# Patient Record
Sex: FEMALE | Race: WHITE | NOT HISPANIC OR LATINO | Employment: OTHER | URBAN - NONMETROPOLITAN AREA
[De-identification: names, ages, dates, MRNs, and addresses within clinical notes are randomized per-mention and may not be internally consistent; named-entity substitution may affect disease eponyms.]

---

## 2017-01-30 RX ORDER — ASPIRIN AND DIPYRIDAMOLE 25; 200 MG/1; MG/1
CAPSULE, EXTENDED RELEASE ORAL
Qty: 180 CAPSULE | Refills: 0 | Status: SHIPPED | OUTPATIENT
Start: 2017-01-30 | End: 2017-05-01 | Stop reason: SDUPTHER

## 2017-03-19 PROBLEM — H25.811 COMBINED FORMS OF AGE-RELATED CATARACT, RIGHT EYE: Status: ACTIVE | Noted: 2017-03-19

## 2017-03-19 RX ORDER — CYCLOPENTOLATE HYDROCHLORIDE 20 MG/ML
1 SOLUTION/ DROPS OPHTHALMIC
Status: CANCELLED | OUTPATIENT
Start: 2017-03-19 | End: 2017-03-19

## 2017-03-19 RX ORDER — PHENYLEPHRINE HYDROCHLORIDE 100 MG/ML
1 SOLUTION/ DROPS OPHTHALMIC
Status: CANCELLED | OUTPATIENT
Start: 2017-03-19 | End: 2017-03-19

## 2017-03-19 RX ORDER — POLYMYXIN B SULFATE AND TRIMETHOPRIM 1; 10000 MG/ML; [USP'U]/ML
1 SOLUTION OPHTHALMIC
Status: CANCELLED | OUTPATIENT
Start: 2017-03-19 | End: 2017-03-26

## 2017-03-20 ENCOUNTER — HOSPITAL ENCOUNTER (OUTPATIENT)
Facility: HOSPITAL | Age: 82
Setting detail: HOSPITAL OUTPATIENT SURGERY
Discharge: HOME | End: 2017-03-20
Attending: OPHTHALMOLOGY | Admitting: OPHTHALMOLOGY

## 2017-03-20 VITALS
HEART RATE: 62 BPM | OXYGEN SATURATION: 97 % | SYSTOLIC BLOOD PRESSURE: 143 MMHG | TEMPERATURE: 97.6 F | BODY MASS INDEX: 33.86 KG/M2 | HEIGHT: 62 IN | WEIGHT: 184 LBS | RESPIRATION RATE: 18 BRPM | DIASTOLIC BLOOD PRESSURE: 59 MMHG

## 2017-03-20 PROBLEM — H26.491 AFTER-CATARACT OF RIGHT EYE WITH VISION OBSCURED: Status: ACTIVE | Noted: 2017-03-19

## 2017-03-20 PROBLEM — H26.491 AFTER-CATARACT OF RIGHT EYE WITH VISION OBSCURED: Status: RESOLVED | Noted: 2017-03-19 | Resolved: 2017-03-20

## 2017-03-20 RX ORDER — PREDNISOLONE ACETATE 10 MG/ML
SUSPENSION/ DROPS OPHTHALMIC
Status: DISCONTINUED
Start: 2017-03-20 | End: 2017-03-20 | Stop reason: HOSPADM

## 2017-03-20 RX ORDER — PREDNISOLONE ACETATE 10 MG/ML
SUSPENSION/ DROPS OPHTHALMIC AS NEEDED
Status: DISCONTINUED | OUTPATIENT
Start: 2017-03-20 | End: 2017-03-20 | Stop reason: HOSPADM

## 2017-03-20 RX ORDER — PREDNISOLONE ACETATE 10 MG/ML
1 SUSPENSION/ DROPS OPHTHALMIC
Status: DISCONTINUED | OUTPATIENT
Start: 2017-03-20 | End: 2017-03-20 | Stop reason: HOSPADM

## 2017-03-20 RX ORDER — TETRACAINE HYDROCHLORIDE 5 MG/ML
1 SOLUTION OPHTHALMIC ONCE
Status: DISCONTINUED | OUTPATIENT
Start: 2017-03-20 | End: 2017-03-20 | Stop reason: HOSPADM

## 2017-03-20 RX ORDER — TETRACAINE HYDROCHLORIDE 5 MG/ML
1 SOLUTION OPHTHALMIC
Status: CANCELLED | OUTPATIENT
Start: 2017-03-20 | End: 2017-03-20

## 2017-03-20 RX ORDER — TETRACAINE HYDROCHLORIDE 5 MG/ML
1 SOLUTION OPHTHALMIC
Status: ACTIVE | OUTPATIENT
Start: 2017-03-20 | End: 2017-03-20

## 2017-03-20 RX ORDER — PREDNISOLONE ACETATE 10 MG/ML
1 SUSPENSION/ DROPS OPHTHALMIC
Status: CANCELLED | OUTPATIENT
Start: 2017-03-20

## 2017-03-20 RX ORDER — TETRACAINE HYDROCHLORIDE 5 MG/ML
SOLUTION OPHTHALMIC AS NEEDED
Status: DISCONTINUED | OUTPATIENT
Start: 2017-03-20 | End: 2017-03-20 | Stop reason: HOSPADM

## 2017-03-20 RX ORDER — PREDNISOLONE ACETATE 10 MG/ML
SUSPENSION/ DROPS OPHTHALMIC
Status: DISCONTINUED
Start: 2017-03-20 | End: 2017-03-20 | Stop reason: WASHOUT

## 2017-03-20 RX ORDER — BALANCED SALT SOLUTION 6.4; .75; .48; .3; 3.9; 1.7 MG/ML; MG/ML; MG/ML; MG/ML; MG/ML; MG/ML
SOLUTION OPHTHALMIC AS NEEDED
Status: DISCONTINUED | OUTPATIENT
Start: 2017-03-20 | End: 2017-03-20 | Stop reason: HOSPADM

## 2017-03-20 RX ORDER — BALANCED SALT SOLUTION 6.4; .75; .48; .3; 3.9; 1.7 MG/ML; MG/ML; MG/ML; MG/ML; MG/ML; MG/ML
SOLUTION OPHTHALMIC
Status: DISCONTINUED
Start: 2017-03-20 | End: 2017-03-20 | Stop reason: HOSPADM

## 2017-03-20 RX ORDER — TETRACAINE HYDROCHLORIDE 5 MG/ML
SOLUTION OPHTHALMIC
Status: DISCONTINUED
Start: 2017-03-20 | End: 2017-03-20 | Stop reason: HOSPADM

## 2017-03-20 NOTE — OP NOTE
Pre-op Diagnosis: Posterior Capsular Opacification, Right eye  Procedure: Nd: YAG Laser Capsulotomy, Right eye    Post-op Diagnosis: Posterior Capsular Opacification, Right eye    Indications:  Tanya Elise is a 83 y.o. female with Posterior Capsular Opacification. It was recommended that the next step in her management be a Nd:YAG laser capsulotomy in the right eye.      Procedure: The right eye was dilated prior to arrival in the laser suite.  A drop of topical Tetracaine was instilled in the right conjunctival cul-de-sac and the patient was then positioned in front of the Nd:YAG laser. The correct eye was again confirmed verbally by both the patient and the surgeon.  The laser contact lens was positioned on the right eye and the capsulotomy was carried out without difficulty using the following parameters:    Laser: Nd:YAG  Spot Size: Fixed  Burst Mode: I  Power Settin.5 mJ/burst  Number of shots: 87  Total energy delivered: 150 mJ    Immediately following the procedure, the right eye was rinsed with BSS solution and a drop of prednisolone acetate 1% was applied.    The patient tolerated the procedure without difficulty. No complications were encountered.    The patient was discharged home with the appropriate instructions.    Ev Cortez MD   3/20/2017  12:04 PM

## 2017-03-20 NOTE — PLAN OF CARE
Problem: Perioperative Period (Adult)  Goal: Signs and Symptoms of Listed Potential Problems Will be Absent or Manageable (Perioperative Period)  Outcome: Ongoing (interventions implemented as appropriate)    03/20/17 0957   Perioperative Period   Problems Assessed (Perioperative Period) all   Problems Present (Perioperative Period) none

## 2017-03-20 NOTE — DISCHARGE INSTRUCTIONS
Post Capsulotomy      POST YAG LASER CAPSULOTOMY      1. ACTIVITY: No physical restrictions.  2. DIET: No dietary restrictions.  3. MEDICATIONS:      A. Resume all the previous medications.      B. One drop of Pred Forte 1%  to operative eye 4 times           a day for 5 days. Then twice a day for  5 days then stop.          Start day of procedure as soon as you get home.  4. Special Instructions:     A. Call the doctor if there is any sudden change in vision. It is normal to see         new floaters or spot floating in your vision after the laser however, if there          is any sudden changes in vision, please call the office during normal work          day [phone]     B. If unable to reach the doctor, come to the Emergency Room at the [facility].    If you have any concerning problems, call your doctor or   the [facility] Emergency Dept   at [phone].Call office with any changes to vision. Spots and floaters are normal.    No dietary or physical restrictions.

## 2017-05-01 ENCOUNTER — OFFICE VISIT (OUTPATIENT)
Dept: INTERNAL MEDICINE | Facility: CLINIC | Age: 82
End: 2017-05-01

## 2017-05-01 VITALS
HEIGHT: 62 IN | DIASTOLIC BLOOD PRESSURE: 70 MMHG | WEIGHT: 203 LBS | OXYGEN SATURATION: 96 % | BODY MASS INDEX: 37.36 KG/M2 | TEMPERATURE: 97.6 F | RESPIRATION RATE: 12 BRPM | SYSTOLIC BLOOD PRESSURE: 122 MMHG | HEART RATE: 63 BPM

## 2017-05-01 DIAGNOSIS — G89.29 CHRONIC PERISCAPULAR PAIN ON RIGHT SIDE: ICD-10-CM

## 2017-05-01 DIAGNOSIS — M25.511 CHRONIC PERISCAPULAR PAIN ON RIGHT SIDE: ICD-10-CM

## 2017-05-01 DIAGNOSIS — K21.9 GASTROESOPHAGEAL REFLUX DISEASE, ESOPHAGITIS PRESENCE NOT SPECIFIED: ICD-10-CM

## 2017-05-01 DIAGNOSIS — I10 ESSENTIAL HYPERTENSION: Primary | ICD-10-CM

## 2017-05-01 PROCEDURE — 99214 OFFICE O/P EST MOD 30 MIN: CPT | Performed by: INTERNAL MEDICINE

## 2017-05-01 RX ORDER — ASPIRIN AND DIPYRIDAMOLE 25; 200 MG/1; MG/1
1 CAPSULE, EXTENDED RELEASE ORAL 2 TIMES DAILY
Qty: 180 CAPSULE | Refills: 3 | Status: SHIPPED | OUTPATIENT
Start: 2017-05-01 | End: 2018-04-19 | Stop reason: SDUPTHER

## 2017-05-01 RX ORDER — MELATONIN
1000 DAILY
Status: ON HOLD | COMMUNITY
End: 2020-10-13

## 2017-05-02 LAB
ALBUMIN SERPL-MCNC: 3.9 G/DL (ref 3.5–5)
ALBUMIN/GLOB SERPL: 1.2 G/DL (ref 1–2)
ALP SERPL-CCNC: 78 U/L (ref 38–126)
ALT SERPL-CCNC: 26 U/L (ref 13–69)
AST SERPL-CCNC: 22 U/L (ref 15–46)
BILIRUB SERPL-MCNC: 0.4 MG/DL (ref 0.2–1.3)
BUN SERPL-MCNC: 27 MG/DL (ref 7–20)
BUN/CREAT SERPL: 20.8 (ref 7.1–23.5)
CALCIUM SERPL-MCNC: 9.6 MG/DL (ref 8.4–10.2)
CHLORIDE SERPL-SCNC: 110 MMOL/L (ref 98–107)
CO2 SERPL-SCNC: 23 MMOL/L (ref 26–30)
CREAT SERPL-MCNC: 1.3 MG/DL (ref 0.6–1.3)
ERYTHROCYTE [DISTWIDTH] IN BLOOD BY AUTOMATED COUNT: 13 % (ref 11.5–14.5)
GLOBULIN SER CALC-MCNC: 3.3 GM/DL
GLUCOSE SERPL-MCNC: 137 MG/DL (ref 74–98)
HCT VFR BLD AUTO: 33 % (ref 37–47)
HGB BLD-MCNC: 10.6 G/DL (ref 12–16)
MCH RBC QN AUTO: 32.8 PG (ref 27–31)
MCHC RBC AUTO-ENTMCNC: 32.1 G/DL (ref 30–37)
MCV RBC AUTO: 102.2 FL (ref 81–99)
PLATELET # BLD AUTO: 254 10*3/MM3 (ref 130–400)
POTASSIUM SERPL-SCNC: 4.8 MMOL/L (ref 3.5–5.1)
PROT SERPL-MCNC: 7.2 G/DL (ref 6.3–8.2)
RBC # BLD AUTO: 3.23 10*6/MM3 (ref 4.2–5.4)
SODIUM SERPL-SCNC: 144 MMOL/L (ref 137–145)
TSH SERPL DL<=0.005 MIU/L-ACNC: 2.82 MIU/ML (ref 0.47–4.68)
WBC # BLD AUTO: 6.63 10*3/MM3 (ref 4.8–10.8)

## 2017-05-09 ENCOUNTER — TREATMENT (OUTPATIENT)
Dept: PHYSICAL THERAPY | Facility: CLINIC | Age: 82
End: 2017-05-09

## 2017-05-09 DIAGNOSIS — M54.50 CHRONIC BILATERAL LOW BACK PAIN WITHOUT SCIATICA: Primary | ICD-10-CM

## 2017-05-09 DIAGNOSIS — G89.29 CHRONIC BILATERAL LOW BACK PAIN WITHOUT SCIATICA: Primary | ICD-10-CM

## 2017-05-09 PROCEDURE — 97161 PT EVAL LOW COMPLEX 20 MIN: CPT | Performed by: PHYSICAL THERAPIST

## 2017-05-09 PROCEDURE — 97110 THERAPEUTIC EXERCISES: CPT | Performed by: PHYSICAL THERAPIST

## 2017-05-09 PROCEDURE — G8982 BODY POS GOAL STATUS: HCPCS | Performed by: PHYSICAL THERAPIST

## 2017-05-09 PROCEDURE — G8981 BODY POS CURRENT STATUS: HCPCS | Performed by: PHYSICAL THERAPIST

## 2017-05-16 ENCOUNTER — TREATMENT (OUTPATIENT)
Dept: PHYSICAL THERAPY | Facility: CLINIC | Age: 82
End: 2017-05-16

## 2017-05-16 DIAGNOSIS — G89.29 CHRONIC BILATERAL LOW BACK PAIN WITHOUT SCIATICA: Primary | ICD-10-CM

## 2017-05-16 DIAGNOSIS — M54.50 CHRONIC BILATERAL LOW BACK PAIN WITHOUT SCIATICA: Primary | ICD-10-CM

## 2017-05-16 PROCEDURE — 97110 THERAPEUTIC EXERCISES: CPT | Performed by: PHYSICAL THERAPIST

## 2017-05-19 ENCOUNTER — TREATMENT (OUTPATIENT)
Dept: PHYSICAL THERAPY | Facility: CLINIC | Age: 82
End: 2017-05-19

## 2017-05-19 DIAGNOSIS — M54.50 CHRONIC BILATERAL LOW BACK PAIN WITHOUT SCIATICA: Primary | ICD-10-CM

## 2017-05-19 DIAGNOSIS — G89.29 CHRONIC BILATERAL LOW BACK PAIN WITHOUT SCIATICA: Primary | ICD-10-CM

## 2017-05-19 PROCEDURE — 97530 THERAPEUTIC ACTIVITIES: CPT | Performed by: PHYSICAL THERAPIST

## 2017-05-19 PROCEDURE — 97110 THERAPEUTIC EXERCISES: CPT | Performed by: PHYSICAL THERAPIST

## 2017-05-24 ENCOUNTER — TREATMENT (OUTPATIENT)
Dept: PHYSICAL THERAPY | Facility: CLINIC | Age: 82
End: 2017-05-24

## 2017-05-24 DIAGNOSIS — G89.29 CHRONIC BILATERAL LOW BACK PAIN WITHOUT SCIATICA: Primary | ICD-10-CM

## 2017-05-24 DIAGNOSIS — M54.50 CHRONIC BILATERAL LOW BACK PAIN WITHOUT SCIATICA: Primary | ICD-10-CM

## 2017-05-24 PROCEDURE — 97530 THERAPEUTIC ACTIVITIES: CPT | Performed by: PHYSICAL THERAPIST

## 2017-05-24 PROCEDURE — 97140 MANUAL THERAPY 1/> REGIONS: CPT | Performed by: PHYSICAL THERAPIST

## 2017-05-24 PROCEDURE — 97110 THERAPEUTIC EXERCISES: CPT | Performed by: PHYSICAL THERAPIST

## 2017-05-26 ENCOUNTER — TREATMENT (OUTPATIENT)
Dept: PHYSICAL THERAPY | Facility: CLINIC | Age: 82
End: 2017-05-26

## 2017-05-26 DIAGNOSIS — M54.50 CHRONIC BILATERAL LOW BACK PAIN WITHOUT SCIATICA: Primary | ICD-10-CM

## 2017-05-26 DIAGNOSIS — G89.29 CHRONIC BILATERAL LOW BACK PAIN WITHOUT SCIATICA: Primary | ICD-10-CM

## 2017-05-26 PROCEDURE — 97110 THERAPEUTIC EXERCISES: CPT | Performed by: PHYSICAL THERAPIST

## 2017-05-26 PROCEDURE — 97530 THERAPEUTIC ACTIVITIES: CPT | Performed by: PHYSICAL THERAPIST

## 2017-05-31 ENCOUNTER — TREATMENT (OUTPATIENT)
Dept: PHYSICAL THERAPY | Facility: CLINIC | Age: 82
End: 2017-05-31

## 2017-05-31 DIAGNOSIS — M54.50 CHRONIC BILATERAL LOW BACK PAIN WITHOUT SCIATICA: Primary | ICD-10-CM

## 2017-05-31 DIAGNOSIS — G89.29 CHRONIC BILATERAL LOW BACK PAIN WITHOUT SCIATICA: Primary | ICD-10-CM

## 2017-05-31 PROCEDURE — 97110 THERAPEUTIC EXERCISES: CPT | Performed by: PHYSICAL THERAPIST

## 2017-06-07 ENCOUNTER — TREATMENT (OUTPATIENT)
Dept: PHYSICAL THERAPY | Facility: CLINIC | Age: 82
End: 2017-06-07

## 2017-06-07 DIAGNOSIS — M54.50 CHRONIC BILATERAL LOW BACK PAIN WITHOUT SCIATICA: Primary | ICD-10-CM

## 2017-06-07 DIAGNOSIS — G89.29 CHRONIC BILATERAL LOW BACK PAIN WITHOUT SCIATICA: Primary | ICD-10-CM

## 2017-06-07 PROCEDURE — 97110 THERAPEUTIC EXERCISES: CPT | Performed by: PHYSICAL THERAPIST

## 2017-06-07 PROCEDURE — 97530 THERAPEUTIC ACTIVITIES: CPT | Performed by: PHYSICAL THERAPIST

## 2017-06-07 NOTE — PROGRESS NOTES
Physical Therapy Daily Progress Note      Visit # : 7    Tanya Elise reports 6/10 pain today at rest.  Pt reports she had a lot of pain after last visit.  She reports she doesn't know why she hurt so bad after last visit.  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  Awaking with 2/10 pain in the morning.     Pt reports doing some exercises daily but not BID.        Objective Pt present to PT today with minimal distress noted    Shoulder AROM L 161 degrees,  R 155 degrees.     Pt with 1/10 pain after unloading in supine position for 5 minutes.       See Exercise, Manual, and Modality Logs for complete treatment.     Assessment/Plan  Pt with sxs are relieved from exercise and unloading.  Pt needs more unloading.       Progress per Plan of Care  Check pt in 1.5 weeks.           Manual Therapy:         mins  45942;  Therapeutic Exercise:    31     mins  82274;     Neuromuscular Marni:        mins  27819;    Therapeutic Activity:     9     mins  70404;     Gait Training:        ___  mins  56549;     Ultrasound:          mins  17245;    Electrical Stimulation:         mins  49744 ( );  Dry Needling          mins self-pay    Timed Treatment:   40   mins   Total Treatment:     48   mins    Josemanuel Grant, PT  Physical Therapist

## 2017-06-21 ENCOUNTER — TREATMENT (OUTPATIENT)
Dept: PHYSICAL THERAPY | Facility: CLINIC | Age: 82
End: 2017-06-21

## 2017-06-21 DIAGNOSIS — G89.29 CHRONIC BILATERAL LOW BACK PAIN WITHOUT SCIATICA: Primary | ICD-10-CM

## 2017-06-21 DIAGNOSIS — M54.50 CHRONIC BILATERAL LOW BACK PAIN WITHOUT SCIATICA: Primary | ICD-10-CM

## 2017-06-21 PROCEDURE — 97530 THERAPEUTIC ACTIVITIES: CPT | Performed by: PHYSICAL THERAPIST

## 2017-06-21 PROCEDURE — 97110 THERAPEUTIC EXERCISES: CPT | Performed by: PHYSICAL THERAPIST

## 2017-06-21 NOTE — PROGRESS NOTES
Physical Therapy Daily Progress Note      Visit # : 8    Tanya Elise reports 2/10 pain today at rest.  Pt reports she feels like she is much better.  She feels stronger.  Pt reports good compliance with HEP in the morning.         Objective Pt present to PT today with no distress noted at rest.     AROM R shoulder 157 degrees    Pt is doing well with HEP Reproduction.  She is able to       See Exercise, Manual, and Modality Logs for complete treatment.     Assessment/Plan  Pt with improved function and improved pain relief.  She is independent with HEP.  She has met goals at this time.         D/C to HEP at this time.             Manual Therapy:         mins  60485;  Therapeutic Exercise:    28     mins  30289;     Neuromuscular Marni:        mins  00586;    Therapeutic Activity:     11   mins  24874;     Gait Training:        ___  mins  49287;     Ultrasound:          mins  27907;    Electrical Stimulation:         mins  14687 ( );  Dry Needling          mins self-pay    Timed Treatment:   39   mins   Total Treatment:     49   mins    Josemanuel Grant, PT  Physical Therapist

## 2017-08-03 ENCOUNTER — OFFICE VISIT (OUTPATIENT)
Dept: INTERNAL MEDICINE | Facility: CLINIC | Age: 82
End: 2017-08-03

## 2017-08-03 VITALS
TEMPERATURE: 98.2 F | WEIGHT: 203.13 LBS | SYSTOLIC BLOOD PRESSURE: 125 MMHG | OXYGEN SATURATION: 91 % | DIASTOLIC BLOOD PRESSURE: 65 MMHG | HEIGHT: 62 IN | BODY MASS INDEX: 37.38 KG/M2 | HEART RATE: 68 BPM

## 2017-08-03 DIAGNOSIS — I10 ESSENTIAL HYPERTENSION: Primary | ICD-10-CM

## 2017-08-03 DIAGNOSIS — E78.00 PURE HYPERCHOLESTEROLEMIA: ICD-10-CM

## 2017-08-03 DIAGNOSIS — K21.9 GASTROESOPHAGEAL REFLUX DISEASE, ESOPHAGITIS PRESENCE NOT SPECIFIED: ICD-10-CM

## 2017-08-03 PROCEDURE — 99214 OFFICE O/P EST MOD 30 MIN: CPT | Performed by: INTERNAL MEDICINE

## 2017-08-03 PROCEDURE — G0009 ADMIN PNEUMOCOCCAL VACCINE: HCPCS | Performed by: INTERNAL MEDICINE

## 2017-08-03 PROCEDURE — 90670 PCV13 VACCINE IM: CPT | Performed by: INTERNAL MEDICINE

## 2017-08-03 NOTE — PROGRESS NOTES
Subjective  Tanya Elise is a 84 y.o. female    HPI coming in for follow she is accompanied by her son was giving us some of the history. Patient with hypertension and hyper lipidemia. Has reflux esophagitis currently without exacerbation of his symptoms. Chronic upper back pain.    The following portions of the patient's history were reviewed and updated as appropriate: allergies, current medications, past family history, past medical history, past social history, past surgical history, and problem list.     Review of Systems   Constitutional: Negative.  Negative for activity change, appetite change, fatigue and fever.   HENT: Negative for congestion, ear discharge, ear pain and trouble swallowing.    Eyes: Negative for photophobia and visual disturbance.   Respiratory: Negative for cough and shortness of breath.    Cardiovascular: Negative for chest pain and palpitations.   Gastrointestinal: Negative for abdominal distention, abdominal pain, constipation, diarrhea, nausea and vomiting.   Endocrine: Negative.    Genitourinary: Negative for dysuria, hematuria and urgency.   Musculoskeletal: Positive for back pain. Negative for arthralgias, joint swelling and myalgias.   Skin: Negative for color change and rash.   Allergic/Immunologic: Negative.    Neurological: Negative for dizziness, weakness, light-headedness and headaches.   Hematological: Negative for adenopathy. Does not bruise/bleed easily.   Psychiatric/Behavioral: Negative for agitation, confusion and dysphoric mood. The patient is not nervous/anxious.        Vitals:    08/03/17 1054   BP: 125/65   Pulse: 68   Temp: 98.2 °F (36.8 °C)   SpO2: 91%       Objective  Physical Exam   Constitutional: She is oriented to person, place, and time. She appears well-developed and well-nourished. No distress.   HENT:   Nose: Nose normal.   Mouth/Throat: Oropharynx is clear and moist.   Eyes: Conjunctivae and EOM are normal. No scleral icterus.   Neck: No tracheal  deviation present. No thyromegaly present.   Cardiovascular: Normal rate and regular rhythm.  Exam reveals no friction rub.    No murmur heard.  Pulmonary/Chest: No respiratory distress. She has no wheezes. She has no rales.   Abdominal: Soft. She exhibits no distension and no mass. There is no tenderness. There is no guarding.   Musculoskeletal: Normal range of motion. She exhibits no deformity.   Lymphadenopathy:     She has no cervical adenopathy.   Neurological: She is alert and oriented to person, place, and time. She has normal reflexes. No cranial nerve deficit. Coordination normal.   Skin: Skin is warm and dry. No rash noted. No erythema.   Psychiatric: She has a normal mood and affect. Her behavior is normal. Judgment and thought content normal.       Diagnoses and all orders for this visit:    Essential hypertension. Stable with current meds and low-salt diet    Pure hypercholesterolemia. Continue with the statins follow lipid profile    Gastroesophageal reflux disease, esophagitis presence not specified. Continue with reflux precautions and proton pump inhibitors

## 2017-09-01 RX ORDER — BACLOFEN 10 MG/1
TABLET ORAL
Qty: 180 TABLET | Refills: 1 | Status: SHIPPED | OUTPATIENT
Start: 2017-09-01 | End: 2018-08-03 | Stop reason: SDUPTHER

## 2017-09-28 RX ORDER — LISINOPRIL 20 MG/1
TABLET ORAL
Qty: 90 TABLET | Refills: 3 | Status: SHIPPED | OUTPATIENT
Start: 2017-09-28 | End: 2018-10-03 | Stop reason: SDUPTHER

## 2018-04-19 RX ORDER — ASPIRIN AND DIPYRIDAMOLE 25; 200 MG/1; MG/1
CAPSULE, EXTENDED RELEASE ORAL
Qty: 180 CAPSULE | Refills: 3 | Status: SHIPPED | OUTPATIENT
Start: 2018-04-19 | End: 2019-05-03 | Stop reason: SDUPTHER

## 2018-08-03 RX ORDER — BACLOFEN 10 MG/1
TABLET ORAL
Qty: 180 TABLET | Refills: 1 | Status: SHIPPED | OUTPATIENT
Start: 2018-08-03 | End: 2019-09-17 | Stop reason: SDUPTHER

## 2018-10-03 RX ORDER — LISINOPRIL 20 MG/1
TABLET ORAL
Qty: 90 TABLET | Refills: 3 | Status: SHIPPED | OUTPATIENT
Start: 2018-10-03 | End: 2019-09-17 | Stop reason: SDUPTHER

## 2019-05-05 RX ORDER — ASPIRIN AND DIPYRIDAMOLE 25; 200 MG/1; MG/1
CAPSULE, EXTENDED RELEASE ORAL
Qty: 180 CAPSULE | Refills: 3 | Status: SHIPPED | OUTPATIENT
Start: 2019-05-05 | End: 2020-04-27 | Stop reason: SDUPTHER

## 2019-08-27 RX ORDER — BACLOFEN 10 MG/1
TABLET ORAL
Qty: 180 TABLET | Refills: 1 | OUTPATIENT
Start: 2019-08-27

## 2019-08-28 RX ORDER — LISINOPRIL 20 MG/1
TABLET ORAL
Qty: 90 TABLET | Refills: 3 | OUTPATIENT
Start: 2019-08-28

## 2019-09-03 RX ORDER — BACLOFEN 10 MG/1
TABLET ORAL
Qty: 180 TABLET | Refills: 1 | OUTPATIENT
Start: 2019-09-03

## 2019-09-09 RX ORDER — LISINOPRIL 20 MG/1
TABLET ORAL
Qty: 90 TABLET | Refills: 3 | OUTPATIENT
Start: 2019-09-09

## 2019-09-09 RX ORDER — BACLOFEN 10 MG/1
TABLET ORAL
Qty: 180 TABLET | Refills: 1 | OUTPATIENT
Start: 2019-09-09

## 2019-09-13 ENCOUNTER — TELEPHONE (OUTPATIENT)
Dept: INTERNAL MEDICINE | Facility: CLINIC | Age: 84
End: 2019-09-13

## 2019-09-13 NOTE — TELEPHONE ENCOUNTER
Patient states that the medicines that were sent in for refill were denied.  Please advise.  Phone number verified.

## 2019-09-16 NOTE — TELEPHONE ENCOUNTER
Spoke with Tanya she has not been seen in 2 years. She is scheduled with Aileen Grande tomorrow at 9:00

## 2019-09-17 ENCOUNTER — OFFICE VISIT (OUTPATIENT)
Dept: INTERNAL MEDICINE | Facility: CLINIC | Age: 84
End: 2019-09-17

## 2019-09-17 VITALS
DIASTOLIC BLOOD PRESSURE: 58 MMHG | BODY MASS INDEX: 35.7 KG/M2 | TEMPERATURE: 97.9 F | SYSTOLIC BLOOD PRESSURE: 136 MMHG | WEIGHT: 194 LBS | OXYGEN SATURATION: 98 % | HEART RATE: 69 BPM | HEIGHT: 62 IN

## 2019-09-17 DIAGNOSIS — Z13.228 SCREENING FOR ENDOCRINE, METABOLIC AND IMMUNITY DISORDER: ICD-10-CM

## 2019-09-17 DIAGNOSIS — Z13.0 SCREENING FOR ENDOCRINE, METABOLIC AND IMMUNITY DISORDER: ICD-10-CM

## 2019-09-17 DIAGNOSIS — J30.9 ALLERGIC RHINITIS, UNSPECIFIED SEASONALITY, UNSPECIFIED TRIGGER: ICD-10-CM

## 2019-09-17 DIAGNOSIS — R30.0 DYSURIA: ICD-10-CM

## 2019-09-17 DIAGNOSIS — Z13.0 SCREENING FOR DISORDER OF BLOOD AND BLOOD-FORMING ORGANS: ICD-10-CM

## 2019-09-17 DIAGNOSIS — E66.01 MORBIDLY OBESE (HCC): ICD-10-CM

## 2019-09-17 DIAGNOSIS — E55.9 VITAMIN D DEFICIENCY: ICD-10-CM

## 2019-09-17 DIAGNOSIS — L60.8 TOENAIL DEFORMITY: ICD-10-CM

## 2019-09-17 DIAGNOSIS — I10 ESSENTIAL HYPERTENSION: ICD-10-CM

## 2019-09-17 DIAGNOSIS — Z13.29 SCREENING FOR ENDOCRINE, METABOLIC AND IMMUNITY DISORDER: ICD-10-CM

## 2019-09-17 DIAGNOSIS — E78.00 PURE HYPERCHOLESTEROLEMIA: Primary | ICD-10-CM

## 2019-09-17 DIAGNOSIS — N89.8 VAGINAL ITCHING: ICD-10-CM

## 2019-09-17 DIAGNOSIS — M62.830 BACK MUSCLE SPASM: ICD-10-CM

## 2019-09-17 LAB
25(OH)D3+25(OH)D2 SERPL-MCNC: 43.6 NG/ML (ref 30–100)
ALBUMIN SERPL-MCNC: 4 G/DL (ref 3.5–5.2)
ALBUMIN/GLOB SERPL: 1.3 G/DL
ALP SERPL-CCNC: 56 U/L (ref 39–117)
ALT SERPL-CCNC: 11 U/L (ref 1–33)
AST SERPL-CCNC: 15 U/L (ref 1–32)
BASOPHILS # BLD AUTO: 0.03 10*3/MM3 (ref 0–0.2)
BASOPHILS NFR BLD AUTO: 0.6 % (ref 0–1.5)
BILIRUB BLD-MCNC: ABNORMAL MG/DL
BILIRUB SERPL-MCNC: 0.3 MG/DL (ref 0.2–1.2)
BUN SERPL-MCNC: 26 MG/DL (ref 8–23)
BUN/CREAT SERPL: 21.1 (ref 7–25)
CALCIUM SERPL-MCNC: 9.2 MG/DL (ref 8.6–10.5)
CHLORIDE SERPL-SCNC: 106 MMOL/L (ref 98–107)
CHOLEST SERPL-MCNC: 276 MG/DL (ref 0–200)
CLARITY, POC: CLEAR
CO2 SERPL-SCNC: 25.9 MMOL/L (ref 22–29)
COLOR UR: YELLOW
CREAT SERPL-MCNC: 1.23 MG/DL (ref 0.57–1)
EOSINOPHIL # BLD AUTO: 0.07 10*3/MM3 (ref 0–0.4)
EOSINOPHIL NFR BLD AUTO: 1.5 % (ref 0.3–6.2)
ERYTHROCYTE [DISTWIDTH] IN BLOOD BY AUTOMATED COUNT: 13.3 % (ref 12.3–15.4)
GLOBULIN SER CALC-MCNC: 3 GM/DL
GLUCOSE SERPL-MCNC: 110 MG/DL (ref 65–99)
GLUCOSE UR STRIP-MCNC: NEGATIVE MG/DL
HBA1C MFR BLD: 6 % (ref 4.8–5.6)
HCT VFR BLD AUTO: 35.5 % (ref 34–46.6)
HDLC SERPL-MCNC: 57 MG/DL (ref 40–60)
HGB BLD-MCNC: 10.9 G/DL (ref 12–15.9)
IMM GRANULOCYTES # BLD AUTO: 0.02 10*3/MM3 (ref 0–0.05)
IMM GRANULOCYTES NFR BLD AUTO: 0.4 % (ref 0–0.5)
KETONES UR QL: NEGATIVE
LDLC SERPL CALC-MCNC: 190 MG/DL (ref 0–100)
LEUKOCYTE EST, POC: ABNORMAL
LYMPHOCYTES # BLD AUTO: 1.64 10*3/MM3 (ref 0.7–3.1)
LYMPHOCYTES NFR BLD AUTO: 34.3 % (ref 19.6–45.3)
MCH RBC QN AUTO: 32 PG (ref 26.6–33)
MCHC RBC AUTO-ENTMCNC: 30.7 G/DL (ref 31.5–35.7)
MCV RBC AUTO: 104.1 FL (ref 79–97)
MONOCYTES # BLD AUTO: 0.57 10*3/MM3 (ref 0.1–0.9)
MONOCYTES NFR BLD AUTO: 11.9 % (ref 5–12)
NEUTROPHILS # BLD AUTO: 2.45 10*3/MM3 (ref 1.7–7)
NEUTROPHILS NFR BLD AUTO: 51.3 % (ref 42.7–76)
NITRITE UR-MCNC: POSITIVE MG/ML
NRBC BLD AUTO-RTO: 0 /100 WBC (ref 0–0.2)
PH UR: 5.5 [PH] (ref 5–8)
PLATELET # BLD AUTO: 234 10*3/MM3 (ref 140–450)
POTASSIUM SERPL-SCNC: 4.9 MMOL/L (ref 3.5–5.2)
PROT SERPL-MCNC: 7 G/DL (ref 6–8.5)
PROT UR STRIP-MCNC: NEGATIVE MG/DL
RBC # BLD AUTO: 3.41 10*6/MM3 (ref 3.77–5.28)
RBC # UR STRIP: NEGATIVE /UL
SODIUM SERPL-SCNC: 144 MMOL/L (ref 136–145)
SP GR UR: 1.02 (ref 1–1.03)
TRIGL SERPL-MCNC: 145 MG/DL (ref 0–150)
TSH SERPL DL<=0.005 MIU/L-ACNC: 3.66 UIU/ML (ref 0.27–4.2)
UROBILINOGEN UR QL: NORMAL
VLDLC SERPL CALC-MCNC: 29 MG/DL
WBC # BLD AUTO: 4.78 10*3/MM3 (ref 3.4–10.8)

## 2019-09-17 PROCEDURE — 81003 URINALYSIS AUTO W/O SCOPE: CPT | Performed by: NURSE PRACTITIONER

## 2019-09-17 PROCEDURE — 99215 OFFICE O/P EST HI 40 MIN: CPT | Performed by: NURSE PRACTITIONER

## 2019-09-17 RX ORDER — BACLOFEN 10 MG/1
5 TABLET ORAL 2 TIMES DAILY
Qty: 180 TABLET | Refills: 1 | Status: SHIPPED | OUTPATIENT
Start: 2019-09-17 | End: 2020-08-28 | Stop reason: SDUPTHER

## 2019-09-17 RX ORDER — CETIRIZINE HYDROCHLORIDE 10 MG/1
5 TABLET ORAL DAILY
Qty: 30 TABLET | Refills: 11 | Status: SHIPPED | OUTPATIENT
Start: 2019-09-17 | End: 2019-10-28 | Stop reason: SDUPTHER

## 2019-09-17 RX ORDER — LISINOPRIL 20 MG/1
20 TABLET ORAL DAILY
Qty: 90 TABLET | Refills: 3 | Status: SHIPPED | OUTPATIENT
Start: 2019-09-17 | End: 2020-11-21 | Stop reason: HOSPADM

## 2019-09-17 NOTE — PROGRESS NOTES
Date: 2019    Name: Tanya Elise  : 1933    Chief Complaint:   Chief Complaint   Patient presents with   • Follow-up       HPI: Tanya presents today with a history of intermittent vaginal itching over the past 2 to 3 months. No vaginal discharge noted.  She also reports occasional pelvic pressure and burning with urination, present earlier today.  Denies fever, chills, malaise, increased fatigue, lower back pain, difficulty urinating or decreased urine output.  She reports she dropped a can on her left second toe months ago.  He feels she may have broken it, taped it to her big toe for the next 2 weeks.  The toenail on that toe is thickened and she would like to see podiatry to have her toenails clipped.  She also relates nasal congestion due to seasonal allergies that are not well controlled with Allegra.  She would like to try a different antihistamine.  She will not use a nasal spray.  Tanya is requesting refills for several medications today.  She has not been seen in clinic for over a year.  Reports no new symptoms other than those noted above.  States GERD is well controlled with omeprazole, she occasionally uses baclofen for back spasms and it is effective without being too sedating.  Denies chest pain, dyspnea, orthopnea, palpitations, lower extremity edema, headaches, weakness, difficulty with speech, asymmetry of facial features, visual disturbances or confusion.    History:  The following portions of the patient's history were reviewed and updated as appropriate: allergies, current medications, past medical history, family history, surgical history, social history and problem list.     ROS:  Review of Systems   Constitutional: Negative for appetite change.   HENT: Positive for postnasal drip. Negative for ear pain, sore throat and tinnitus.    Eyes: Negative for itching.   Respiratory: Negative for cough.    Gastrointestinal: Negative for constipation and diarrhea.   Endocrine:  "Negative for polydipsia, polyphagia and polyuria.   Skin: Negative for dry skin and pallor.   Neurological: Negative for memory problem.       VS:  Vitals:    09/17/19 0906   BP: 136/58   Pulse: 69   Temp: 97.9 °F (36.6 °C)   TempSrc: Temporal   SpO2: 98%   Weight: 88 kg (194 lb)   Height: 157.5 cm (62\")     Body mass index is 35.48 kg/m².    PE:  Physical Exam   Constitutional: She is oriented to person, place, and time. She appears well-developed and well-nourished. No distress.   HENT:   Head: Normocephalic.   Right Ear: Tympanic membrane, external ear and ear canal normal.   Left Ear: Tympanic membrane, external ear and ear canal normal.   Nose: Mucosal edema present. No sinus tenderness.   Mouth/Throat: Uvula is midline and oropharynx is clear and moist. Mucous membranes are pale.   Eyes: Conjunctivae and EOM are normal. Pupils are equal, round, and reactive to light.   Neck: Normal range of motion. Neck supple. No thyromegaly present.   Cardiovascular: Normal rate, regular rhythm, normal heart sounds and intact distal pulses.   Pulmonary/Chest: Effort normal and breath sounds normal.   Abdominal: Soft. Bowel sounds are normal. She exhibits no distension. There is no tenderness.   Lymphadenopathy:     She has no cervical adenopathy.   Neurological: She is alert and oriented to person, place, and time.   Skin: Skin is warm and dry. Capillary refill takes less than 2 seconds.   Left second toenail is very thick, twisted toward the left great toe.   Psychiatric: She has a normal mood and affect. Her behavior is normal. Judgment and thought content normal.       Assessment/Plan:  Tanya was seen today for follow-up.    Diagnoses and all orders for this visit:    Pure hypercholesterolemia  -     Lipid Panel  -     atorvastatin (LIPITOR) 20 MG tablet; Take 1 tablet by mouth Every Night.  Morbidly obese (CMS/HCC)        - Patient is aware that she she should use caloric intake, physical activity as " tolerated  Essential hypertension  -     Comprehensive Metabolic Panel  -     lisinopril (PRINIVIL,ZESTRIL) 20 MG tablet; Take 1 tablet by mouth Daily.  Vaginal itching  -     NuSwab BV & Candida - Swab, Vagina  - Advised this may be due to vaginal atrophy, she verbalizes understanding  Dysuria  -     POCT urinalysis dipstick, automated  -     Urine Culture - Urine, Urine, Clean Catch  Screening for endocrine, metabolic and immunity disorder  -     Comprehensive Metabolic Panel  -     TSH  -     Hemoglobin A1c  Screening for disorder of blood and blood-forming organs  -     CBC & Differential  -     Vitamin B12  -     Folate  Vitamin D deficiency  -     Vitamin D 25 Hydroxy  Back muscle spasm  -     baclofen (LIORESAL) 10 MG tablet; Take 0.5 tablets by mouth 2 (Two) Times a Day.  Allergic rhinitis, unspecified seasonality, unspecified trigger  -     cetirizine (zyrTEC) 10 MG tablet; Take 0.5 tablets by mouth Daily.  Toenail deformity  -     Ambulatory Referral to Podiatry    Return in about 4 weeks (around 10/15/2019) for Medicare Wellness.  She declined flu shot today.  States she will address annual screenings and vaccinations when she comes for Medicare wellness exam.

## 2019-09-18 RX ORDER — ATORVASTATIN CALCIUM 20 MG/1
20 TABLET, FILM COATED ORAL NIGHTLY
Qty: 30 TABLET | Refills: 5 | Status: SHIPPED | OUTPATIENT
Start: 2019-09-18 | End: 2020-09-22 | Stop reason: SDDI

## 2019-09-19 LAB
A VAGINAE DNA VAG QL NAA+PROBE: NORMAL SCORE
BVAB2 DNA VAG QL NAA+PROBE: NORMAL SCORE
C ALBICANS DNA VAG QL NAA+PROBE: NEGATIVE
C GLABRATA DNA VAG QL NAA+PROBE: NEGATIVE
MEGA1 DNA VAG QL NAA+PROBE: NORMAL SCORE

## 2019-09-20 LAB
BACTERIA UR CULT: ABNORMAL
BACTERIA UR CULT: ABNORMAL
OTHER ANTIBIOTIC SUSC ISLT: ABNORMAL

## 2019-09-20 RX ORDER — CEFUROXIME AXETIL 250 MG/1
250 TABLET ORAL 2 TIMES DAILY
Qty: 20 TABLET | Refills: 0 | Status: SHIPPED | OUTPATIENT
Start: 2019-09-20 | End: 2019-09-30

## 2019-10-15 ENCOUNTER — OFFICE VISIT (OUTPATIENT)
Dept: INTERNAL MEDICINE | Facility: CLINIC | Age: 84
End: 2019-10-15

## 2019-10-15 VITALS
BODY MASS INDEX: 35.7 KG/M2 | DIASTOLIC BLOOD PRESSURE: 80 MMHG | TEMPERATURE: 98.3 F | HEART RATE: 61 BPM | SYSTOLIC BLOOD PRESSURE: 132 MMHG | WEIGHT: 194 LBS | OXYGEN SATURATION: 98 % | HEIGHT: 62 IN

## 2019-10-15 DIAGNOSIS — I10 ESSENTIAL HYPERTENSION: ICD-10-CM

## 2019-10-15 DIAGNOSIS — I67.82 TEMPORARY CEREBRAL VASCULAR DYSFUNCTION: ICD-10-CM

## 2019-10-15 DIAGNOSIS — E78.00 PURE HYPERCHOLESTEROLEMIA: ICD-10-CM

## 2019-10-15 DIAGNOSIS — Z23 NEED FOR TETANUS, DIPHTHERIA, AND ACELLULAR PERTUSSIS (TDAP) VACCINE: ICD-10-CM

## 2019-10-15 DIAGNOSIS — K21.9 GASTROESOPHAGEAL REFLUX DISEASE, ESOPHAGITIS PRESENCE NOT SPECIFIED: ICD-10-CM

## 2019-10-15 DIAGNOSIS — Z00.00 MEDICARE ANNUAL WELLNESS VISIT, SUBSEQUENT: Primary | ICD-10-CM

## 2019-10-15 PROCEDURE — G0439 PPPS, SUBSEQ VISIT: HCPCS | Performed by: NURSE PRACTITIONER

## 2019-10-15 NOTE — PROGRESS NOTES
The ABCs of the Annual Wellness Visit  Subsequent Medicare Wellness Visit    Chief Complaint   Patient presents with   • Medicare Wellness-subsequent       Subjective   History of Present Illness:  Tanya Elise is a 86 y.o. female who presents for a Subsequent Medicare Wellness Visit.  She reports she has TIAs, hypertension, GERD, high cholesterol.  Eats a relatively well-balanced diet, tries to monitor saturated fats.  She does not exercise on a regular basis.  She is using a cane to ambulate today.  No residual problems from TIA. Denies chest pain, dyspnea, orthopnea, palpitations, lower extremity edema, headaches, weakness, visual disturbances or confusion.    HEALTH RISK ASSESSMENT    Recent Hospitalizations:  No hospitalization(s) within the last year.    Current Medical Providers:  Patient Care Team:  Serjio Jackson MD as PCP - General  Serjio Jackson MD as PCP - Family Medicine  Linda Stokes APRN as PCP - Claims Attributed    Smoking Status:  Social History     Tobacco Use   Smoking Status Never Smoker   Smokeless Tobacco Never Used       Alcohol Consumption:  Social History     Substance and Sexual Activity   Alcohol Use No       Depression Screen:   PHQ-2/PHQ-9 Depression Screening 9/17/2019   Little interest or pleasure in doing things 0   Feeling down, depressed, or hopeless 0   Total Score 0       Fall Risk Screen:  STEADI Fall Risk Assessment was completed, and patient is at LOW risk for falls.Assessment completed on:10/15/2019    Health Habits and Functional and Cognitive Screening:  Functional & Cognitive Status 10/15/2019   Do you have difficulty preparing food and eating? No   Do you have difficulty bathing yourself, getting dressed or grooming yourself? No   Do you have difficulty using the toilet? No   Do you have difficulty moving around from place to place? No   Do you have trouble with steps or getting out of a bed or a chair? Yes   Current Diet Well Balanced Diet   Dental Exam  Up to date   Eye Exam Not up to date   Exercise (times per week) 1 times per week   Current Exercise Activities Include Housecleaning   Do you need help using the phone?  No   Are you deaf or do you have serious difficulty hearing?  No   Do you need help with transportation? No   Do you need help shopping? No   Do you need help preparing meals?  No   Do you need help with housework?  No   Do you need help with laundry? No   Do you need help taking your medications? No   Do you need help managing money? No   Do you ever drive or ride in a car without wearing a seat belt? No   Have you felt unusual stress, anger or loneliness in the last month? No   Who do you live with? Child   If you need help, do you have trouble finding someone available to you? No   Have you been bothered in the last four weeks by sexual problems? No   Do you have difficulty concentrating, remembering or making decisions? No         Does the patient have evidence of cognitive impairment? No    Asprin use counseling:takes aggrenox    Age-appropriate Screening Schedule:  Refer to the list below for future screening recommendations based on patient's age, sex and/or medical conditions. Orders for these recommended tests are listed in the plan section. The patient has been provided with a written plan.    Health Maintenance   Topic Date Due   • TDAP/TD VACCINES (1 - Tdap) 05/07/1952   • ZOSTER VACCINE (1 of 2) 05/07/1983   • INFLUENZA VACCINE  08/01/2019   • PNEUMOCOCCAL VACCINES (65+ LOW/MEDIUM RISK) (2 of 2 - PPSV23) 10/15/2019 (Originally 8/3/2018)   • MAMMOGRAM  10/15/2019 (Originally 6/10/2016)   • LIPID PANEL  09/17/2020          The following portions of the patient's history were reviewed and updated as appropriate: problem list.    Outpatient Medications Prior to Visit   Medication Sig Dispense Refill   • aspirin-dipyridamole (AGGRENOX)  MG per 12 hr capsule take 1 capsule by mouth twice a day 180 capsule 3   • atorvastatin (LIPITOR)  20 MG tablet Take 1 tablet by mouth Every Night. 30 tablet 5   • baclofen (LIORESAL) 10 MG tablet Take 0.5 tablets by mouth 2 (Two) Times a Day. 180 tablet 1   • cetirizine (zyrTEC) 10 MG tablet Take 0.5 tablets by mouth Daily. 30 tablet 11   • cholecalciferol (VITAMIN D3) 1000 UNITS tablet Take 1,000 Units by mouth Daily.     • lisinopril (PRINIVIL,ZESTRIL) 20 MG tablet Take 1 tablet by mouth Daily. 90 tablet 3   • omeprazole (PriLOSEC) 20 MG capsule take 1 capsule by mouth once daily 90 capsule 3     No facility-administered medications prior to visit.        Patient Active Problem List   Diagnosis   • Pure hypercholesterolemia   • Essential hypertension   • Temporary cerebral vascular dysfunction   • Esophageal reflux   • BMI 35.0-35.9,adult   • Morbidly obese (CMS/[facility])       Advanced Care Planning:  Patient has an advance directive - a copy has not been provided. Have asked the patient to send this to us to add to record    Review of Systems   Constitutional: Negative for activity change, appetite change and fatigue.   HENT: Negative for congestion, ear pain, sneezing and sore throat.    Eyes: Negative for pain and visual disturbance.   Respiratory: Negative for cough, shortness of breath and wheezing.    Cardiovascular: Negative for chest pain and palpitations.   Gastrointestinal: Negative for constipation, diarrhea and nausea.   Genitourinary: Negative for dysuria, frequency, hematuria and urgency.   Musculoskeletal: Positive for arthralgias. Negative for myalgias.   Skin: Negative for pallor and rash.   Neurological: Negative for dizziness, facial asymmetry, speech difficulty, light-headedness and headaches.   Psychiatric/Behavioral: Negative for decreased concentration and dysphoric mood. The patient is not nervous/anxious.        Compared to one year ago, the patient feels her physical health is better.  Compared to one year ago, the patient feels her mental health is the same.    Reviewed chart for  "potential of high risk medication in the elderly: yes  Reviewed chart for potential of harmful drug interactions in the elderly:yes    Objective         Vitals:    10/15/19 1447   BP: 132/80   Pulse: 61   Temp: 98.3 °F (36.8 °C)   TempSrc: Temporal   SpO2: 98%   Weight: 88 kg (194 lb)   Height: 157.5 cm (62\")       Body mass index is 35.48 kg/m².  Discussed the patient's BMI with her. The BMI is above average; BMI management plan is completed.    Physical Exam   Constitutional: She is oriented to person, place, and time. She appears well-developed and well-nourished. No distress.   HENT:   Head: Normocephalic.   Right Ear: Tympanic membrane, external ear and ear canal normal.   Left Ear: Tympanic membrane, external ear and ear canal normal.   Nose: Nose normal.   Mouth/Throat: Uvula is midline and mucous membranes are normal. Posterior oropharyngeal erythema present.   Eyes: Conjunctivae and EOM are normal. Pupils are equal, round, and reactive to light.   Neck: Normal range of motion. Neck supple. No thyromegaly present.   Cardiovascular: Normal rate, regular rhythm, normal heart sounds and intact distal pulses.   Pulmonary/Chest: Effort normal and breath sounds normal.   Abdominal: Normal aorta and bowel sounds are normal. There is no tenderness.   Musculoskeletal: She exhibits no edema.   Lymphadenopathy:     She has no cervical adenopathy.   Neurological: She is alert and oriented to person, place, and time. No sensory deficit. Coordination and gait normal.   Annual nerves II through XII normal.  Upper extremities and lower extremities equal   Skin: Skin is warm. Capillary refill takes less than 2 seconds.   Psychiatric: She has a normal mood and affect. Her behavior is normal.       Lab Results   Component Value Date     (H) 09/17/2019    CHLPL 276 (H) 09/17/2019    TRIG 145 09/17/2019    HDL 57 09/17/2019     (H) 09/17/2019    VLDL 29 09/17/2019    HGBA1C 6.00 (H) 09/17/2019    "     Assessment/Plan   Medicare Risks and Personalized Health Plan  CMS Preventative Services Quick Reference  Breast Cancer/Mammogram Screening  Cardiovascular risk  Fall Risk  Immunizations Discussed/Encouraged (specific immunizations; adacel Tdap and Influenza).  She has received influenza vaccine pharmacy.  Will ask them to fax a copy to clinic.  Declines pneumonia vaccine and shingles vaccine at this time cost.  Inactivity/Sedentary  Obesity/Overweight   Polypharmacy    The above risks/problems have been discussed with the patient.  Pertinent information has been shared with the patient in the After Visit Summary.  Follow up plans and orders are seen below in the Assessment/Plan Section.    Diagnoses and all orders for this visit:    1. Medicare annual wellness visit, subsequent (Primary)    2. Essential hypertension        - Follow heart healthy diet.  Advised to reduce daily sodium intake to < 1500 mg per day.  Avoid processed & fast foods.        - Exercise as tolerated, with a goal of 30 minutes of moderate exercise most days.         - Take medications as prescribed.  3. Pure hypercholesterolemia        - Heart healthy diet advised        - Take medication as prescribed  4. Gastroesophageal reflux disease, esophagitis presence not specified        - Avoid triggers such as spicy or greasy food, alcohol, chocolate, caffeine        - May raise HOB 30 degrees with risers or blocks, if desired        - Do not eat within 2-3 hours of lying down        - Avoid clothing, belts that constrict midsection        - Advised losing even 5% of body weight may reduce symptoms        - Take medication as prescribed  5. BMI 35.0-35.9,adult        - Patient's Body mass index is 35.48 kg/m². BMI is above normal parameters. Recommendations include: nutrition counseling. Declined nutrition consult at this time.  6. Temporary cerebral vascular dysfunction        - Monitor for sudden headache, dizziness, facial droop, asymmetry in  extremities        - Take aggrenox as prescribed  7. Need for tetanus, diphtheria, and acellular pertussis (Tdap) vaccine         - Will get from pharmacy, ask them to fax record to clinic      Follow Up:  Return if symptoms worsen or fail to improve.     An After Visit Summary and PPPS were given to the patient.

## 2019-10-16 LAB
FOLATE SERPL-MCNC: 10.7 NG/ML (ref 4.78–24.2)
VIT B12 SERPL-MCNC: 429 PG/ML (ref 211–946)

## 2019-10-28 ENCOUNTER — OFFICE VISIT (OUTPATIENT)
Dept: INTERNAL MEDICINE | Facility: CLINIC | Age: 84
End: 2019-10-28

## 2019-10-28 VITALS
DIASTOLIC BLOOD PRESSURE: 72 MMHG | SYSTOLIC BLOOD PRESSURE: 120 MMHG | WEIGHT: 191 LBS | HEART RATE: 60 BPM | TEMPERATURE: 98.7 F | BODY MASS INDEX: 35.15 KG/M2 | OXYGEN SATURATION: 98 % | HEIGHT: 62 IN

## 2019-10-28 DIAGNOSIS — J30.9 ALLERGIC RHINITIS, UNSPECIFIED SEASONALITY, UNSPECIFIED TRIGGER: ICD-10-CM

## 2019-10-28 DIAGNOSIS — E66.01 MORBIDLY OBESE (HCC): ICD-10-CM

## 2019-10-28 DIAGNOSIS — J06.9 URI WITH COUGH AND CONGESTION: Primary | ICD-10-CM

## 2019-10-28 DIAGNOSIS — I10 ESSENTIAL HYPERTENSION: ICD-10-CM

## 2019-10-28 PROCEDURE — 99214 OFFICE O/P EST MOD 30 MIN: CPT | Performed by: NURSE PRACTITIONER

## 2019-10-28 RX ORDER — BENZONATATE 100 MG/1
100 CAPSULE ORAL 3 TIMES DAILY PRN
Qty: 21 CAPSULE | Refills: 0 | Status: SHIPPED | OUTPATIENT
Start: 2019-10-28 | End: 2019-11-04

## 2019-10-28 RX ORDER — CETIRIZINE HYDROCHLORIDE 10 MG/1
5 TABLET ORAL DAILY
Qty: 30 TABLET | Refills: 11 | Status: ON HOLD | OUTPATIENT
Start: 2019-10-28 | End: 2020-10-13

## 2019-10-28 RX ORDER — GUAIFENESIN 600 MG/1
600 TABLET, EXTENDED RELEASE ORAL 2 TIMES DAILY PRN
Qty: 20 TABLET | Refills: 0 | Status: SHIPPED | OUTPATIENT
Start: 2019-10-28 | End: 2019-11-07

## 2019-10-28 NOTE — PROGRESS NOTES
"Date: 10/28/2019    Name: Tanya Elise  : 1933    Chief Complaint:   Chief Complaint   Patient presents with   • Cough     throat has gotten sore due to coughing, not sleeping well.       HPI:  Tanya Elise is a 86 y.o. female presents with a productive cough for 3 days.  Laying down increases cough, & she is having muscle soreness in her back due to continued coughing. She has noticed chilling, decreased appetite, diarrhea, slight nasal congestion, sore throat.  Denies earache, headache, fever, nausea, SOA, wheezing.  No sick contacts.  She has not taken any medication for this illness. She is currently being treated for HTN.  She does not monitor BP at home.  Denies chest pain, palpitations, lower extremity edema, headaches, weakness, visual disturbances or confusion.    History:  The following portions of the patient's history were reviewed and updated as appropriate: allergies, current medications, past medical history, family history, surgical history, social history and problem list.     ROS:  Review of Systems   Constitutional: Positive for fatigue. Negative for activity change.   HENT: Positive for voice change. Negative for rhinorrhea, sneezing, tinnitus and trouble swallowing.    Eyes: Negative for itching.       VS:  Vitals:    10/28/19 1507   BP: 120/72   Pulse: 60   Temp: 98.7 °F (37.1 °C)   TempSrc: Temporal   SpO2: 98%   Weight: 86.6 kg (191 lb)   Height: 157.5 cm (62\")     Body mass index is 34.93 kg/m².    PE:  Physical Exam   Constitutional: She is oriented to person, place, and time. She appears well-developed and well-nourished. No distress. She is obese.  HENT:   Head: Normocephalic.   Right Ear: Tympanic membrane, external ear and ear canal normal.   Left Ear: Tympanic membrane, external ear and ear canal normal.   Nose: Mucosal edema present. No sinus tenderness.   Mouth/Throat: Uvula is midline and mucous membranes are normal. Posterior oropharyngeal erythema present. "   Eyes: Conjunctivae are normal. Pupils are equal, round, and reactive to light.   Neck: Neck supple.   Cardiovascular: Normal rate, regular rhythm, normal heart sounds and intact distal pulses.   Pulmonary/Chest: Effort normal and breath sounds normal.   Musculoskeletal: Normal range of motion. She exhibits no edema or tenderness.   Lymphadenopathy:     She has no cervical adenopathy.   Neurological: She is alert and oriented to person, place, and time.   Skin: Skin is warm. Capillary refill takes less than 2 seconds.     Assessment/Plan:  Tanya was seen today for cough.    Diagnoses and all orders for this visit:    URI with cough and congestion  -     benzonatate (TESSALON PERLES) 100 MG capsule; Take 1 capsule by mouth 3 (Three) Times a Day As Needed for Cough for up to 7 days.  -     guaiFENesin (MUCINEX) 600 MG 12 hr tablet; Take 1 tablet by mouth 2 (Two) Times a Day As Needed for Cough for up to 10 days. Advised to drink plenty of water while taking this medication  Allergic rhinitis, unspecified seasonality, unspecified trigger  -     cetirizine (zyrTEC) 10 MG tablet; Take 0.5 tablets by mouth Daily.  - Refused flonase at this time  Essential hypertension        - Follow heart healthy diet.  Advised to reduce daily sodium intake to < 1500 mg per day.  Avoid processed & fast foods.        - Monitor blood pressure as discussed, keep log and bring to next appointment.          - Exercise as tolerated, with a goal of 30 minutes of moderate exercise most days, when acute illness resolved.         - Take medications as prescribed.  Morbidly obese (CMS/[facility])        - Patient's Body mass index is 34.93 kg/m². BMI is above normal parameters. Recommendations include: exercise counseling and nutrition counseling. Declined counseling at this time.    Return if symptoms worsen or fail to improve.

## 2020-04-27 RX ORDER — ASPIRIN AND DIPYRIDAMOLE 25; 200 MG/1; MG/1
1 CAPSULE, EXTENDED RELEASE ORAL 2 TIMES DAILY
Qty: 180 CAPSULE | Refills: 1 | Status: ON HOLD | OUTPATIENT
Start: 2020-04-27 | End: 2020-10-13

## 2020-06-22 ENCOUNTER — TELEPHONE (OUTPATIENT)
Dept: INTERNAL MEDICINE | Facility: CLINIC | Age: 85
End: 2020-06-22

## 2020-06-22 NOTE — TELEPHONE ENCOUNTER
Patient's daughter Mariangel requested an office visit with ELISHA Grande. Patient has intermittent diarrhea, this was first noticed about 3 weeks ago. Patient has tried taking Imodium and it does help for a little bit but the diarrhea returns when the medication wears off. Patient also has been complaining of pain radiating in her hands while she is urinating, this was first noticed about a week ago. Patient is having muscle spasms in her back.     Please call and advise. Mariangel's call back [phone]. Patient's call back [phone]

## 2020-06-23 PROBLEM — R19.7 DIARRHEA: Status: ACTIVE | Noted: 2020-06-23

## 2020-06-23 PROCEDURE — U0002 COVID-19 LAB TEST NON-CDC: HCPCS | Performed by: FAMILY MEDICINE

## 2020-06-26 PROCEDURE — 87427 SHIGA-LIKE TOXIN AG IA: CPT | Performed by: FAMILY MEDICINE

## 2020-06-26 PROCEDURE — 87045 FECES CULTURE AEROBIC BACT: CPT | Performed by: FAMILY MEDICINE

## 2020-06-26 PROCEDURE — 87046 STOOL CULTR AEROBIC BACT EA: CPT | Performed by: FAMILY MEDICINE

## 2020-06-26 PROCEDURE — 87493 C DIFF AMPLIFIED PROBE: CPT | Performed by: FAMILY MEDICINE

## 2020-08-28 DIAGNOSIS — M62.830 BACK MUSCLE SPASM: ICD-10-CM

## 2020-08-28 RX ORDER — BACLOFEN 10 MG/1
5 TABLET ORAL 2 TIMES DAILY
Qty: 180 TABLET | Refills: 1 | Status: ON HOLD | OUTPATIENT
Start: 2020-08-28 | End: 2020-10-13

## 2020-09-15 ENCOUNTER — APPOINTMENT (OUTPATIENT)
Dept: GENERAL RADIOLOGY | Facility: HOSPITAL | Age: 85
End: 2020-09-15

## 2020-09-15 ENCOUNTER — APPOINTMENT (OUTPATIENT)
Dept: CT IMAGING | Facility: HOSPITAL | Age: 85
End: 2020-09-15

## 2020-09-15 ENCOUNTER — HOSPITAL ENCOUNTER (OUTPATIENT)
Facility: HOSPITAL | Age: 85
Setting detail: OBSERVATION
Discharge: HOME | End: 2020-09-16
Attending: EMERGENCY MEDICINE | Admitting: FAMILY MEDICINE

## 2020-09-15 DIAGNOSIS — G45.9 TIA (TRANSIENT ISCHEMIC ATTACK): Primary | ICD-10-CM

## 2020-09-15 DIAGNOSIS — Z74.09 IMPAIRED MOBILITY AND ADLS: ICD-10-CM

## 2020-09-15 DIAGNOSIS — Z78.9 IMPAIRED MOBILITY AND ADLS: ICD-10-CM

## 2020-09-15 PROBLEM — I16.0 HYPERTENSIVE URGENCY: Status: ACTIVE | Noted: 2020-09-15

## 2020-09-15 PROBLEM — N18.30 CHRONIC KIDNEY DISEASE, STAGE III (MODERATE): Status: ACTIVE | Noted: 2020-09-15

## 2020-09-15 LAB
ALBUMIN SERPL-MCNC: 3.9 G/DL (ref 3.5–5.2)
ALBUMIN/GLOB SERPL: 1.2 G/DL
ALP SERPL-CCNC: 66 U/L (ref 39–117)
ALT SERPL W P-5'-P-CCNC: 10 U/L (ref 1–33)
ANION GAP SERPL CALCULATED.3IONS-SCNC: 16.4 MMOL/L (ref 5–15)
AST SERPL-CCNC: 16 U/L (ref 1–32)
BASOPHILS # BLD AUTO: 0.04 10*3/MM3 (ref 0–0.2)
BASOPHILS NFR BLD AUTO: 0.5 % (ref 0–1.5)
BILIRUB SERPL-MCNC: 0.3 MG/DL (ref 0–1.2)
BUN SERPL-MCNC: 39 MG/DL (ref 8–23)
BUN/CREAT SERPL: 26.4 (ref 7–25)
CALCIUM SPEC-SCNC: 9 MG/DL (ref 8.6–10.5)
CHLORIDE SERPL-SCNC: 101 MMOL/L (ref 98–107)
CO2 SERPL-SCNC: 23.6 MMOL/L (ref 22–29)
CREAT SERPL-MCNC: 1.48 MG/DL (ref 0.57–1)
DEPRECATED RDW RBC AUTO: 48.4 FL (ref 37–54)
EOSINOPHIL # BLD AUTO: 0.22 10*3/MM3 (ref 0–0.4)
EOSINOPHIL NFR BLD AUTO: 2.9 % (ref 0.3–6.2)
ERYTHROCYTE [DISTWIDTH] IN BLOOD BY AUTOMATED COUNT: 12.9 % (ref 12.3–15.4)
GFR SERPL CREATININE-BSD FRML MDRD: 33 ML/MIN/1.73
GLOBULIN UR ELPH-MCNC: 3.3 GM/DL
GLUCOSE SERPL-MCNC: 108 MG/DL (ref 65–99)
HCT VFR BLD AUTO: 32.9 % (ref 34–46.6)
HGB BLD-MCNC: 10.6 G/DL (ref 12–15.9)
IMM GRANULOCYTES # BLD AUTO: 0.02 10*3/MM3 (ref 0–0.05)
IMM GRANULOCYTES NFR BLD AUTO: 0.3 % (ref 0–0.5)
INR PPP: 1.05 (ref 0.9–1.1)
LYMPHOCYTES # BLD AUTO: 2.71 10*3/MM3 (ref 0.7–3.1)
LYMPHOCYTES NFR BLD AUTO: 35.7 % (ref 19.6–45.3)
MCH RBC QN AUTO: 33 PG (ref 26.6–33)
MCHC RBC AUTO-ENTMCNC: 32.2 G/DL (ref 31.5–35.7)
MCV RBC AUTO: 102.5 FL (ref 79–97)
MONOCYTES # BLD AUTO: 0.7 10*3/MM3 (ref 0.1–0.9)
MONOCYTES NFR BLD AUTO: 9.2 % (ref 5–12)
NEUTROPHILS NFR BLD AUTO: 3.9 10*3/MM3 (ref 1.7–7)
NEUTROPHILS NFR BLD AUTO: 51.4 % (ref 42.7–76)
NRBC BLD AUTO-RTO: 0 /100 WBC (ref 0–0.2)
PLATELET # BLD AUTO: 238 10*3/MM3 (ref 140–450)
PMV BLD AUTO: 11.8 FL (ref 6–12)
POTASSIUM SERPL-SCNC: 5 MMOL/L (ref 3.5–5.2)
PROT SERPL-MCNC: 7.2 G/DL (ref 6–8.5)
PROTHROMBIN TIME: 14.2 SECONDS (ref 12–15.1)
RBC # BLD AUTO: 3.21 10*6/MM3 (ref 3.77–5.28)
SODIUM SERPL-SCNC: 141 MMOL/L (ref 136–145)
TROPONIN T SERPL-MCNC: <0.01 NG/ML (ref 0–0.03)
WBC # BLD AUTO: 7.59 10*3/MM3 (ref 3.4–10.8)

## 2020-09-15 PROCEDURE — 70498 CT ANGIOGRAPHY NECK: CPT

## 2020-09-15 PROCEDURE — 85025 COMPLETE CBC W/AUTO DIFF WBC: CPT | Performed by: EMERGENCY MEDICINE

## 2020-09-15 PROCEDURE — 96372 THER/PROPH/DIAG INJ SC/IM: CPT

## 2020-09-15 PROCEDURE — 85610 PROTHROMBIN TIME: CPT | Performed by: EMERGENCY MEDICINE

## 2020-09-15 PROCEDURE — G0378 HOSPITAL OBSERVATION PER HR: HCPCS

## 2020-09-15 PROCEDURE — 25010000002 HYDRALAZINE PER 20 MG: Performed by: EMERGENCY MEDICINE

## 2020-09-15 PROCEDURE — 25010000002 ENOXAPARIN PER 10 MG: Performed by: INTERNAL MEDICINE

## 2020-09-15 PROCEDURE — 71046 X-RAY EXAM CHEST 2 VIEWS: CPT

## 2020-09-15 PROCEDURE — 93005 ELECTROCARDIOGRAM TRACING: CPT | Performed by: EMERGENCY MEDICINE

## 2020-09-15 PROCEDURE — 25010000002 IOPAMIDOL 61 % SOLUTION: Performed by: EMERGENCY MEDICINE

## 2020-09-15 PROCEDURE — 70450 CT HEAD/BRAIN W/O DYE: CPT

## 2020-09-15 PROCEDURE — 80053 COMPREHEN METABOLIC PANEL: CPT | Performed by: EMERGENCY MEDICINE

## 2020-09-15 PROCEDURE — 96374 THER/PROPH/DIAG INJ IV PUSH: CPT

## 2020-09-15 PROCEDURE — 70496 CT ANGIOGRAPHY HEAD: CPT

## 2020-09-15 PROCEDURE — 99219 PR INITIAL OBSERVATION CARE/DAY 50 MINUTES: CPT | Performed by: INTERNAL MEDICINE

## 2020-09-15 PROCEDURE — 99284 EMERGENCY DEPT VISIT MOD MDM: CPT

## 2020-09-15 PROCEDURE — 84484 ASSAY OF TROPONIN QUANT: CPT | Performed by: EMERGENCY MEDICINE

## 2020-09-15 RX ORDER — HYDRALAZINE HYDROCHLORIDE 20 MG/ML
10 INJECTION INTRAMUSCULAR; INTRAVENOUS ONCE
Status: COMPLETED | OUTPATIENT
Start: 2020-09-15 | End: 2020-09-15

## 2020-09-15 RX ORDER — SODIUM CHLORIDE 0.9 % (FLUSH) 0.9 %
10 SYRINGE (ML) INJECTION EVERY 12 HOURS SCHEDULED
Status: DISCONTINUED | OUTPATIENT
Start: 2020-09-15 | End: 2020-09-16 | Stop reason: HOSPADM

## 2020-09-15 RX ORDER — LISINOPRIL 20 MG/1
20 TABLET ORAL DAILY
Status: DISCONTINUED | OUTPATIENT
Start: 2020-09-16 | End: 2020-09-16 | Stop reason: HOSPADM

## 2020-09-15 RX ORDER — CETIRIZINE HYDROCHLORIDE 10 MG/1
5 TABLET ORAL DAILY
Status: DISCONTINUED | OUTPATIENT
Start: 2020-09-16 | End: 2020-09-16 | Stop reason: HOSPADM

## 2020-09-15 RX ORDER — BACLOFEN 10 MG/1
5 TABLET ORAL 2 TIMES DAILY
Status: DISCONTINUED | OUTPATIENT
Start: 2020-09-15 | End: 2020-09-16 | Stop reason: HOSPADM

## 2020-09-15 RX ORDER — PANTOPRAZOLE SODIUM 40 MG/1
40 TABLET, DELAYED RELEASE ORAL EVERY MORNING
Status: DISCONTINUED | OUTPATIENT
Start: 2020-09-16 | End: 2020-09-16 | Stop reason: HOSPADM

## 2020-09-15 RX ORDER — ASPIRIN AND DIPYRIDAMOLE 25; 200 MG/1; MG/1
1 CAPSULE, EXTENDED RELEASE ORAL 2 TIMES DAILY
Status: DISCONTINUED | OUTPATIENT
Start: 2020-09-15 | End: 2020-09-16 | Stop reason: HOSPADM

## 2020-09-15 RX ORDER — ONDANSETRON 2 MG/ML
4 INJECTION INTRAMUSCULAR; INTRAVENOUS EVERY 6 HOURS PRN
Status: DISCONTINUED | OUTPATIENT
Start: 2020-09-15 | End: 2020-09-16 | Stop reason: HOSPADM

## 2020-09-15 RX ORDER — ATORVASTATIN CALCIUM 20 MG/1
20 TABLET, FILM COATED ORAL NIGHTLY
Status: DISCONTINUED | OUTPATIENT
Start: 2020-09-15 | End: 2020-09-16 | Stop reason: HOSPADM

## 2020-09-15 RX ORDER — SODIUM CHLORIDE 0.9 % (FLUSH) 0.9 %
10 SYRINGE (ML) INJECTION AS NEEDED
Status: DISCONTINUED | OUTPATIENT
Start: 2020-09-15 | End: 2020-09-16 | Stop reason: HOSPADM

## 2020-09-15 RX ORDER — ACETAMINOPHEN 160 MG/5ML
650 SOLUTION ORAL EVERY 4 HOURS PRN
Status: DISCONTINUED | OUTPATIENT
Start: 2020-09-15 | End: 2020-09-16 | Stop reason: HOSPADM

## 2020-09-15 RX ORDER — ACETAMINOPHEN 650 MG/1
650 SUPPOSITORY RECTAL EVERY 4 HOURS PRN
Status: DISCONTINUED | OUTPATIENT
Start: 2020-09-15 | End: 2020-09-16 | Stop reason: HOSPADM

## 2020-09-15 RX ORDER — AMLODIPINE BESYLATE 5 MG/1
5 TABLET ORAL NIGHTLY
Status: DISCONTINUED | OUTPATIENT
Start: 2020-09-15 | End: 2020-09-16 | Stop reason: HOSPADM

## 2020-09-15 RX ORDER — ACETAMINOPHEN 325 MG/1
650 TABLET ORAL EVERY 4 HOURS PRN
Status: DISCONTINUED | OUTPATIENT
Start: 2020-09-15 | End: 2020-09-16 | Stop reason: HOSPADM

## 2020-09-15 RX ORDER — VITAMIN B COMPLEX
1000 TABLET ORAL DAILY
Status: DISCONTINUED | OUTPATIENT
Start: 2020-09-16 | End: 2020-09-16 | Stop reason: HOSPADM

## 2020-09-15 RX ADMIN — AMLODIPINE BESYLATE 5 MG: 5 TABLET ORAL at 22:50

## 2020-09-15 RX ADMIN — IOPAMIDOL 100 ML: 612 INJECTION, SOLUTION INTRAVENOUS at 20:18

## 2020-09-15 RX ADMIN — ASPIRIN AND DIPYRIDAMOLE 1 CAPSULE: 25; 200 CAPSULE, EXTENDED RELEASE ORAL at 22:55

## 2020-09-15 RX ADMIN — HYDRALAZINE HYDROCHLORIDE 10 MG: 20 INJECTION, SOLUTION INTRAMUSCULAR; INTRAVENOUS at 19:29

## 2020-09-15 RX ADMIN — BACLOFEN 5 MG: 10 TABLET ORAL at 22:50

## 2020-09-15 RX ADMIN — ATORVASTATIN CALCIUM 20 MG: 20 TABLET, FILM COATED ORAL at 22:50

## 2020-09-15 RX ADMIN — SODIUM CHLORIDE 1000 ML: 9 INJECTION, SOLUTION INTRAVENOUS at 20:37

## 2020-09-15 RX ADMIN — ENOXAPARIN SODIUM 30 MG: 30 INJECTION SUBCUTANEOUS at 22:50

## 2020-09-15 NOTE — ED PROVIDER NOTES
TRIAGE CHIEF COMPLAINT:     Nursing and triage notes reviewed    Chief Complaint   Patient presents with   • Facial Droop      HPI: Tanya Elise is a 87 y.o. female who presents to the emergency department complaining of left-sided facial droop.  Symptoms began approximately 1 hour prior to arrival.  Patient is nondistressed on arrival.  Patient does have a prior history of a TIA.  Son states that he noticed that it looked like there was some drooping on the left side of her face.  Patient states that there was some tingling on her left upper lip and it felt like someone had injected it with lidocaine.  She denies any changes in vision.  No numbness, tingling, or weakness in her extremities.    REVIEW OF SYSTEMS: All other systems reviewed and are negative     PAST MEDICAL HISTORY:   Past Medical History:   Diagnosis Date   • Acid reflux    • Allergic    • Cataract    • Cerumen impaction    • Dizziness    • High cholesterol    • Hypertension    • Influenza    • Reflux gastritis    • Seasonal allergies    • Spinal headache    • Stroke (CMS/HCC)    • Transient ischemic attack    • Wears glasses         FAMILY HISTORY:   Family History   Problem Relation Age of Onset   • Diabetes Son         SOCIAL HISTORY:   Social History     Socioeconomic History   • Marital status:      Spouse name: Not on file   • Number of children: Not on file   • Years of education: Not on file   • Highest education level: Not on file   Tobacco Use   • Smoking status: Never Smoker   • Smokeless tobacco: Never Used   Substance and Sexual Activity   • Alcohol use: No   • Drug use: No   • Sexual activity: Defer        SURGICAL HISTORY:   Past Surgical History:   Procedure Laterality Date   • APPENDECTOMY     • BREAST BIOPSY Left    • CATARACT EXTRACTION Bilateral    •  SECTION      X2   • COLONOSCOPY     • DILATATION AND CURETTAGE     • EYE CAPSULOTOMY WITH LASER Right 3/20/2017    Procedure: RIGHT EYE CAPSULOTOMY WITH  LASER;  Surgeon: Ev Cortez MD;  Location: [facility];  Service:    • HYSTERECTOMY     • NASAL SEPTUM SURGERY     • TOOTH EXTRACTION          CURRENT MEDICATIONS:      Medication List      ASK your doctor about these medications    aspirin-dipyridamole  MG per 12 hr capsule  Commonly known as: AGGRENOX  Take 1 capsule by mouth 2 (Two) Times a Day.     atorvastatin 20 MG tablet  Commonly known as: LIPITOR  Take 1 tablet by mouth Every Night.     baclofen 10 MG tablet  Commonly known as: LIORESAL  Take 0.5 tablets by mouth 2 (Two) Times a Day.     cetirizine 10 MG tablet  Commonly known as: zyrTEC  Take 0.5 tablets by mouth Daily.     cholecalciferol 25 MCG (1000 UT) tablet  Commonly known as: VITAMIN D3     ciprofloxacin 250 MG tablet  Commonly known as: CIPRO  Take 1 tablet by mouth 2 (Two) Times a Day.     lisinopril 20 MG tablet  Commonly known as: PRINIVIL,ZESTRIL  Take 1 tablet by mouth Daily.     omeprazole 20 MG capsule  Commonly known as: priLOSEC  take 1 capsule by mouth once daily             ALLERGIES: Penicillins     PHYSICAL EXAM:   VITAL SIGNS:   Vitals:    09/15/20 1751   BP: (!) 202/81   Pulse: 76   Resp: 16   Temp: 98.7 °F (37.1 °C)   SpO2: 99%      CONSTITUTIONAL: Awake, oriented, appears non-toxic   HENT: Atraumatic, normocephalic, oral mucosa pink and moist, airway patent. Nares patent without drainage. External ears normal.   EYES: Conjunctiva clear, EOMI, PERRL   NECK: Trachea midline, non-tender, supple   CARDIOVASCULAR: Normal heart rate, Normal rhythm, No murmurs, rubs, gallops   PULMONARY/CHEST: Clear to auscultation, no rhonchi, wheezes, or rales. Symmetrical breath sounds.  ABDOMINAL: Non-distended, soft, non-tender - no rebound or guarding.  NEUROLOGIC: There is what appears to be a small subtle amount of swelling in the left upper lip.  This does make her smile slightly asymmetrical.  There is no drooping of the eye or on the forehead.  There are no apparent sensory deficits on  the face.  There is no weakness of the shoulders, elbows, wrists, hands, hips, knees, ankles bilaterally.  There is otherwise normal strength.  Finger-to-nose testing is intact.  EXTREMITIES: No clubbing, cyanosis, or edema   SKIN: Warm, Dry, No erythema, No rash     ED COURSE / MEDICAL DECISION MAKING:   Tanya Elise is a 87 y.o. female who presents to the emergency department for evaluation of facial droop.  Patient does have a slightly asymmetrical smile although there appears to be some subtle swelling in the left upper lip.  There are no other obvious neurological issues.  Patient is noted to be hypertensive on arrival with a blood pressure of 200/81.  Patient does take lisinopril given the swelling I suspect that angioedema could be the potential cause of her symptoms.  However, given her blood pressure and history of previous TIA I felt that patient should be evaluated by the neurologist.    Spoke with the tele-neurologist at 6:38 describing patient's symptoms and history.  He requested CT of the head as well as CTA of the head.  He stated he wished us to let him know if these imaging studies were abnormal for the next step, however if they were within normal limits to admit for further work-up.  He stated that angioedema could be the cause, however given her history and blood pressure this also could potentially be a stroke.    An EKG was obtained and interpreted by me and reveals sinus rhythm with a rate of 75 bpm.  There are few nonspecific findings but no obvious acute signs of arrhythmia or ischemia.  This is an atypical appearing EKG.    Basic labs are largely unremarkable.    CT scan of the head per radiology interpretation did not reveal any acute abnormalities.    CTA of the head and neck revealed some mild stenosis of the left carotid but otherwise was unremarkable.    Will admit to the hospital for further work-up.  Given subtle findings and at very most 1 on the NIH stroke scale TPA was  not recommended.    DECISION TO DISCHARGE/ADMIT: see ED care timeline     FINAL IMPRESSION:   1 --TIA  2 --   3 --     Electronically signed by: Karen Roth MD, 9/15/2020 18:13 Karen Wagner MD  09/15/20 9997

## 2020-09-16 ENCOUNTER — APPOINTMENT (OUTPATIENT)
Dept: CARDIOLOGY | Facility: HOSPITAL | Age: 85
End: 2020-09-16

## 2020-09-16 ENCOUNTER — READMISSION MANAGEMENT (OUTPATIENT)
Dept: CALL CENTER | Facility: HOSPITAL | Age: 85
End: 2020-09-16

## 2020-09-16 VITALS
SYSTOLIC BLOOD PRESSURE: 134 MMHG | HEART RATE: 65 BPM | HEIGHT: 62 IN | DIASTOLIC BLOOD PRESSURE: 54 MMHG | WEIGHT: 199.7 LBS | BODY MASS INDEX: 36.75 KG/M2 | TEMPERATURE: 97.8 F | RESPIRATION RATE: 18 BRPM | OXYGEN SATURATION: 99 %

## 2020-09-16 LAB
ANION GAP SERPL CALCULATED.3IONS-SCNC: 10 MMOL/L (ref 5–15)
BASOPHILS # BLD AUTO: 0.03 10*3/MM3 (ref 0–0.2)
BASOPHILS NFR BLD AUTO: 0.5 % (ref 0–1.5)
BH CV ECHO MEAS - % IVS THICK: 37.2 %
BH CV ECHO MEAS - % LVPW THICK: 39.6 %
BH CV ECHO MEAS - AO MAX PG (FULL): 8.4 MMHG
BH CV ECHO MEAS - AO MAX PG: 12 MMHG
BH CV ECHO MEAS - AO MEAN PG (FULL): 4 MMHG
BH CV ECHO MEAS - AO MEAN PG: 6 MMHG
BH CV ECHO MEAS - AO V2 MAX: 170 CM/SEC
BH CV ECHO MEAS - AO V2 MEAN: 106 CM/SEC
BH CV ECHO MEAS - AO V2 VTI: 41.3 CM
BH CV ECHO MEAS - AVA(I,A): 1.6 CM^2
BH CV ECHO MEAS - AVA(I,D): 1.6 CM^2
BH CV ECHO MEAS - AVA(V,A): 1.4 CM^2
BH CV ECHO MEAS - AVA(V,D): 1.4 CM^2
BH CV ECHO MEAS - BSA(HAYCOCK): 2 M^2
BH CV ECHO MEAS - BSA: 1.9 M^2
BH CV ECHO MEAS - BZI_BMI: 36.4 KILOGRAMS/M^2
BH CV ECHO MEAS - BZI_METRIC_HEIGHT: 157.5 CM
BH CV ECHO MEAS - BZI_METRIC_WEIGHT: 90.3 KG
BH CV ECHO MEAS - EDV(CUBED): 70.4 ML
BH CV ECHO MEAS - EDV(MOD-SP2): 93.9 ML
BH CV ECHO MEAS - EDV(MOD-SP4): 93.2 ML
BH CV ECHO MEAS - EDV(TEICH): 75.5 ML
BH CV ECHO MEAS - EF(CUBED): 46.6 %
BH CV ECHO MEAS - EF(MOD-BP): 68.5 %
BH CV ECHO MEAS - EF(MOD-SP2): 70.2 %
BH CV ECHO MEAS - EF(MOD-SP4): 66.4 %
BH CV ECHO MEAS - EF(TEICH): 39.4 %
BH CV ECHO MEAS - ESV(CUBED): 37.6 ML
BH CV ECHO MEAS - ESV(MOD-SP2): 28 ML
BH CV ECHO MEAS - ESV(MOD-SP4): 31.3 ML
BH CV ECHO MEAS - ESV(TEICH): 45.8 ML
BH CV ECHO MEAS - FS: 18.9 %
BH CV ECHO MEAS - IVS/LVPW: 0.86
BH CV ECHO MEAS - IVSD: 0.78 CM
BH CV ECHO MEAS - IVSS: 1.1 CM
BH CV ECHO MEAS - LA DIMENSION: 3.6 CM
BH CV ECHO MEAS - LAD MAJOR: 6 CM
BH CV ECHO MEAS - LAT PEAK E' VEL: 7.5 CM/SEC
BH CV ECHO MEAS - LATERAL E/E' RATIO: 14.4
BH CV ECHO MEAS - LV DIASTOLIC VOL/BSA (35-75): 48.9 ML/M^2
BH CV ECHO MEAS - LV MASS(C)D: 106 GRAMS
BH CV ECHO MEAS - LV MASS(C)DI: 55.6 GRAMS/M^2
BH CV ECHO MEAS - LV MASS(C)S: 122.6 GRAMS
BH CV ECHO MEAS - LV MASS(C)SI: 64.3 GRAMS/M^2
BH CV ECHO MEAS - LV MAX PG: 3.6 MMHG
BH CV ECHO MEAS - LV MEAN PG: 2 MMHG
BH CV ECHO MEAS - LV SYSTOLIC VOL/BSA (12-30): 16.4 ML/M^2
BH CV ECHO MEAS - LV V1 MAX: 94.8 CM/SEC
BH CV ECHO MEAS - LV V1 MEAN: 57.6 CM/SEC
BH CV ECHO MEAS - LV V1 VTI: 25.2 CM
BH CV ECHO MEAS - LVIDD: 4.1 CM
BH CV ECHO MEAS - LVIDS: 3.4 CM
BH CV ECHO MEAS - LVLD AP2: 7.1 CM
BH CV ECHO MEAS - LVLD AP4: 7.4 CM
BH CV ECHO MEAS - LVLS AP2: 5.7 CM
BH CV ECHO MEAS - LVLS AP4: 6 CM
BH CV ECHO MEAS - LVOT AREA (M): 2.5 CM^2
BH CV ECHO MEAS - LVOT AREA: 2.5 CM^2
BH CV ECHO MEAS - LVOT DIAM: 1.8 CM
BH CV ECHO MEAS - LVPWD: 0.91 CM
BH CV ECHO MEAS - LVPWS: 1.3 CM
BH CV ECHO MEAS - MED PEAK E' VEL: 6.5 CM/SEC
BH CV ECHO MEAS - MEDIAL E/E' RATIO: 16.5
BH CV ECHO MEAS - MV A MAX VEL: 142 CM/SEC
BH CV ECHO MEAS - MV DEC TIME: 0.32 SEC
BH CV ECHO MEAS - MV E MAX VEL: 108 CM/SEC
BH CV ECHO MEAS - MV E/A: 0.76
BH CV ECHO MEAS - MV MAX PG: 7.4 MMHG
BH CV ECHO MEAS - MV MEAN PG: 4 MMHG
BH CV ECHO MEAS - MV V2 MAX: 136 CM/SEC
BH CV ECHO MEAS - MV V2 MEAN: 87.8 CM/SEC
BH CV ECHO MEAS - MV V2 VTI: 43.4 CM
BH CV ECHO MEAS - MVA(VTI): 1.5 CM^2
BH CV ECHO MEAS - PA ACC TIME: 0.12 SEC
BH CV ECHO MEAS - PA MAX PG (FULL): 3 MMHG
BH CV ECHO MEAS - PA MAX PG: 4.7 MMHG
BH CV ECHO MEAS - PA MEAN PG (FULL): 1 MMHG
BH CV ECHO MEAS - PA MEAN PG: 2 MMHG
BH CV ECHO MEAS - PA PR(ACCEL): 26.8 MMHG
BH CV ECHO MEAS - PA V2 MAX: 108 CM/SEC
BH CV ECHO MEAS - PA V2 MEAN: 70.7 CM/SEC
BH CV ECHO MEAS - PA V2 VTI: 24.2 CM
BH CV ECHO MEAS - PULM A REVS DUR: 0.14 SEC
BH CV ECHO MEAS - PULM A REVS VEL: 28.7 CM/SEC
BH CV ECHO MEAS - PULM DIAS VEL: 54.4 CM/SEC
BH CV ECHO MEAS - PULM S/D: 1.6
BH CV ECHO MEAS - PULM SYS VEL: 88.5 CM/SEC
BH CV ECHO MEAS - RV MAX PG: 1.7 MMHG
BH CV ECHO MEAS - RV MEAN PG: 1 MMHG
BH CV ECHO MEAS - RV V1 MAX: 64.3 CM/SEC
BH CV ECHO MEAS - RV V1 MEAN: 38 CM/SEC
BH CV ECHO MEAS - RV V1 VTI: 11.4 CM
BH CV ECHO MEAS - SI(CUBED): 17.2 ML/M^2
BH CV ECHO MEAS - SI(LVOT): 33.6 ML/M^2
BH CV ECHO MEAS - SI(MOD-SP2): 34.5 ML/M^2
BH CV ECHO MEAS - SI(MOD-SP4): 32.5 ML/M^2
BH CV ECHO MEAS - SI(TEICH): 15.6 ML/M^2
BH CV ECHO MEAS - SV(CUBED): 32.8 ML
BH CV ECHO MEAS - SV(LVOT): 64.1 ML
BH CV ECHO MEAS - SV(MOD-SP2): 65.9 ML
BH CV ECHO MEAS - SV(MOD-SP4): 61.9 ML
BH CV ECHO MEAS - SV(TEICH): 29.7 ML
BH CV ECHO MEAS - TAPSE (>1.6): 2.2 CM
BH CV ECHO MEAS - TR MAX PG: 17 MMHG
BH CV ECHO MEAS - TR MAX VEL: 204.7 CM/SEC
BH CV ECHO MEASUREMENTS AVERAGE E/E' RATIO: 15.43
BH CV XLRA - RV BASE: 3.5 CM
BH CV XLRA - RV LENGTH: 6.8 CM
BH CV XLRA - RV MID: 2.6 CM
BH CV XLRA - TDI S': 11.7 CM/SEC
BUN SERPL-MCNC: 31 MG/DL (ref 8–23)
BUN/CREAT SERPL: 24.6 (ref 7–25)
CALCIUM SPEC-SCNC: 8.3 MG/DL (ref 8.6–10.5)
CHLORIDE SERPL-SCNC: 112 MMOL/L (ref 98–107)
CHOLEST SERPL-MCNC: 232 MG/DL (ref 0–200)
CO2 SERPL-SCNC: 21 MMOL/L (ref 22–29)
CREAT SERPL-MCNC: 1.26 MG/DL (ref 0.57–1)
DEPRECATED RDW RBC AUTO: 47 FL (ref 37–54)
EOSINOPHIL # BLD AUTO: 0.17 10*3/MM3 (ref 0–0.4)
EOSINOPHIL NFR BLD AUTO: 2.8 % (ref 0.3–6.2)
ERYTHROCYTE [DISTWIDTH] IN BLOOD BY AUTOMATED COUNT: 13 % (ref 12.3–15.4)
GFR SERPL CREATININE-BSD FRML MDRD: 40 ML/MIN/1.73
GLUCOSE SERPL-MCNC: 108 MG/DL (ref 65–99)
HCT VFR BLD AUTO: 27.7 % (ref 34–46.6)
HDLC SERPL-MCNC: 42 MG/DL (ref 40–60)
HGB BLD-MCNC: 9.2 G/DL (ref 12–15.9)
IMM GRANULOCYTES # BLD AUTO: 0.02 10*3/MM3 (ref 0–0.05)
IMM GRANULOCYTES NFR BLD AUTO: 0.3 % (ref 0–0.5)
LDLC SERPL CALC-MCNC: 157 MG/DL (ref 0–100)
LDLC/HDLC SERPL: 3.74 {RATIO}
LEFT ATRIUM VOLUME INDEX: 30 ML/M^2
LEFT ATRIUM VOLUME: 57.2 ML
LYMPHOCYTES # BLD AUTO: 2.33 10*3/MM3 (ref 0.7–3.1)
LYMPHOCYTES NFR BLD AUTO: 38.4 % (ref 19.6–45.3)
MCH RBC QN AUTO: 33.3 PG (ref 26.6–33)
MCHC RBC AUTO-ENTMCNC: 33.2 G/DL (ref 31.5–35.7)
MCV RBC AUTO: 100.4 FL (ref 79–97)
MONOCYTES # BLD AUTO: 0.5 10*3/MM3 (ref 0.1–0.9)
MONOCYTES NFR BLD AUTO: 8.2 % (ref 5–12)
NEUTROPHILS NFR BLD AUTO: 3.02 10*3/MM3 (ref 1.7–7)
NEUTROPHILS NFR BLD AUTO: 49.8 % (ref 42.7–76)
NRBC BLD AUTO-RTO: 0 /100 WBC (ref 0–0.2)
PLATELET # BLD AUTO: 209 10*3/MM3 (ref 140–450)
PMV BLD AUTO: 11 FL (ref 6–12)
POTASSIUM SERPL-SCNC: 4.2 MMOL/L (ref 3.5–5.2)
RBC # BLD AUTO: 2.76 10*6/MM3 (ref 3.77–5.28)
SODIUM SERPL-SCNC: 143 MMOL/L (ref 136–145)
TRIGL SERPL-MCNC: 164 MG/DL (ref 0–150)
TROPONIN T SERPL-MCNC: <0.01 NG/ML (ref 0–0.03)
TSH SERPL DL<=0.05 MIU/L-ACNC: 3.01 UIU/ML (ref 0.27–4.2)
VLDLC SERPL-MCNC: 32.8 MG/DL
WBC # BLD AUTO: 6.07 10*3/MM3 (ref 3.4–10.8)

## 2020-09-16 PROCEDURE — G0378 HOSPITAL OBSERVATION PER HR: HCPCS

## 2020-09-16 PROCEDURE — 80048 BASIC METABOLIC PNL TOTAL CA: CPT | Performed by: INTERNAL MEDICINE

## 2020-09-16 PROCEDURE — 85025 COMPLETE CBC W/AUTO DIFF WBC: CPT | Performed by: INTERNAL MEDICINE

## 2020-09-16 PROCEDURE — 97165 OT EVAL LOW COMPLEX 30 MIN: CPT

## 2020-09-16 PROCEDURE — 80061 LIPID PANEL: CPT | Performed by: INTERNAL MEDICINE

## 2020-09-16 PROCEDURE — G0008 ADMIN INFLUENZA VIRUS VAC: HCPCS | Performed by: INTERNAL MEDICINE

## 2020-09-16 PROCEDURE — 92610 EVALUATE SWALLOWING FUNCTION: CPT

## 2020-09-16 PROCEDURE — 84443 ASSAY THYROID STIM HORMONE: CPT | Performed by: INTERNAL MEDICINE

## 2020-09-16 PROCEDURE — 97161 PT EVAL LOW COMPLEX 20 MIN: CPT

## 2020-09-16 PROCEDURE — 90686 IIV4 VACC NO PRSV 0.5 ML IM: CPT | Performed by: INTERNAL MEDICINE

## 2020-09-16 PROCEDURE — 25010000002 INFLUENZA VAC SPLIT QUAD 0.5 ML SUSPENSION PREFILLED SYRINGE: Performed by: INTERNAL MEDICINE

## 2020-09-16 PROCEDURE — 84484 ASSAY OF TROPONIN QUANT: CPT | Performed by: INTERNAL MEDICINE

## 2020-09-16 PROCEDURE — 99217 PR OBSERVATION CARE DISCHARGE MANAGEMENT: CPT | Performed by: FAMILY MEDICINE

## 2020-09-16 RX ORDER — AMLODIPINE BESYLATE 5 MG/1
5 TABLET ORAL NIGHTLY
Qty: 30 TABLET | Refills: 0 | Status: SHIPPED | OUTPATIENT
Start: 2020-09-16 | End: 2020-10-19 | Stop reason: SDUPTHER

## 2020-09-16 RX ADMIN — PANTOPRAZOLE SODIUM 40 MG: 40 TABLET, DELAYED RELEASE ORAL at 06:43

## 2020-09-16 RX ADMIN — ACETAMINOPHEN 650 MG: 325 TABLET, FILM COATED ORAL at 01:15

## 2020-09-16 RX ADMIN — BACLOFEN 5 MG: 10 TABLET ORAL at 09:59

## 2020-09-16 RX ADMIN — Medication 1000 UNITS: at 09:59

## 2020-09-16 RX ADMIN — ASPIRIN AND DIPYRIDAMOLE 1 CAPSULE: 25; 200 CAPSULE, EXTENDED RELEASE ORAL at 10:02

## 2020-09-16 RX ADMIN — SODIUM CHLORIDE, PRESERVATIVE FREE 10 ML: 5 INJECTION INTRAVENOUS at 09:59

## 2020-09-16 RX ADMIN — INFLUENZA VIRUS VACCINE 0.5 ML: 15; 15; 15; 15 SUSPENSION INTRAMUSCULAR at 14:42

## 2020-09-16 RX ADMIN — CETIRIZINE HYDROCHLORIDE 5 MG: 10 TABLET, FILM COATED ORAL at 09:59

## 2020-09-16 RX ADMIN — LISINOPRIL 20 MG: 20 TABLET ORAL at 09:59

## 2020-09-16 NOTE — THERAPY DISCHARGE NOTE
Acute Care - Occupational Therapy Discharge  [facility]    Patient Name: Tanya Elise  : 1933    MRN: [id number]                              Today's Date: 2020       Admit Date: 9/15/2020    Visit Dx:     ICD-10-CM ICD-9-CM   1. TIA (transient ischemic attack)  G45.9 435.9   2. Impaired mobility and ADLs  Z74.09 V49.89    Z78.9      Patient Active Problem List   Diagnosis   • Pure hypercholesterolemia   • Essential hypertension   • Temporary cerebral vascular dysfunction   • Esophageal reflux   • BMI 35.0-35.9,adult   • Morbidly obese (CMS/HCC)   • Diarrhea   • TIA (transient ischemic attack)   • Hypertensive urgency   • Chronic kidney disease, stage III (moderate) (CMS/HCC)     Past Medical History:   Diagnosis Date   • Acid reflux    • Allergic    • Cataract    • Cerumen impaction    • Dizziness    • High cholesterol    • Hypertension    • Influenza    • Reflux gastritis    • Seasonal allergies    • Spinal headache    • Stroke (CMS/HCC)    • Transient ischemic attack    • Wears glasses      Past Surgical History:   Procedure Laterality Date   • APPENDECTOMY     • BREAST BIOPSY Left    • CATARACT EXTRACTION Bilateral    •  SECTION      X2   • COLONOSCOPY     • DILATATION AND CURETTAGE     • EYE CAPSULOTOMY WITH LASER Right 3/20/2017    Procedure: RIGHT EYE CAPSULOTOMY WITH LASER;  Surgeon: Ev Cortez MD;  Location: [facility];  Service:    • HYSTERECTOMY     • NASAL SEPTUM SURGERY     • TOOTH EXTRACTION       General Information    No documentation.       Mobility/ADL's    No documentation.       Obj/Interventions    No documentation.       Goals/Plan    No documentation.       Clinical Impression    No documentation.       Outcome Measures    No documentation.       Occupational Therapy Education                 Title: PT OT SLP Therapies (In Progress)     Topic: Occupational Therapy (In Progress)     Point: ADL training (Done)     Description:   Instruct learner(s) on proper safety  adaptation and remediation techniques during self care or transfers.   Instruct in proper use of assistive devices.              Learning Progress Summary           Patient Acceptance, E,TB, VU by SD at 9/16/2020 1108    Comment: Benefit of OT; OT POC; no OT needs                   Point: Home exercise program (Not Started)     Description:   Instruct learner(s) on appropriate technique for monitoring, assisting and/or progressing therapeutic exercises/activities.              Learner Progress:  Not documented in this visit.          Point: Precautions (Not Started)     Description:   Instruct learner(s) on prescribed precautions during self-care and functional transfers.              Learner Progress:  Not documented in this visit.          Point: Body mechanics (Not Started)     Description:   Instruct learner(s) on proper positioning and spine alignment during self-care, functional mobility activities and/or exercises.              Learner Progress:  Not documented in this visit.                      User Key     Initials Effective Dates Name Provider Type Discipline    SD 03/07/18 -  Tamera Ashley OT Occupational Therapist OT              OT Recommendation and Plan  Retired Outcome Summary/Treatment Plan (OT)  Progress Toward Functional Goals (OT): progress toward functional goals as expected  Reason for Discharge (OT): no further needs identified  Therapy Frequency (OT): evaluation only  Progress Toward Functional Goals (OT): progress toward functional goals as expected  Plan of Care Review  Plan of Care Reviewed With: patient  Progress: improving  Outcome Summary: OT eval completed. Patient completed bed mobility independently, transfers and functional mobility with no AD and sup x 412'. Requires S/U-Sup for ADLs. No OT needs at this time.   Plan of Care Reviewed With: patient  Outcome Summary: OT eval completed. Patient completed bed mobility independently, transfers and functional mobility with no AD and  sup x 412'. Requires S/U-Sup for ADLs. No OT needs at this time.      Time Calculation:   Time Calculation- OT     Row Name 09/16/20 1109             Time Calculation- OT    OT Start Time  0920 -SD      OT Received On  09/16/20  -SD        User Key  (r) = Recorded By, (t) = Taken By, (c) = Cosigned By    Initials Name Provider Type    Tamera Reyna OT Occupational Therapist        Therapy Charges for Today     Code Description Service Date Service Provider Modifiers Qty    [id number]  OT EVAL LOW COMPLEXITY 3 9/16/2020 Tamera Ashley OT GO 1               Tamera Ashley OT  9/16/2020

## 2020-09-16 NOTE — DISCHARGE SUMMARY
[facility] HOSPITALIST   DISCHARGE SUMMARY      Name:  Tanya Elise   Age:  87 y.o.  Sex:  female  :  1933  MRN:  [id number]   Visit Number:  [id number]  Primary Care Physician:  Serjio Jackson MD  Date of Discharge:  2020  Admission Date:  9/15/2020    Discharge Diagnosis:     1.  Hypertensive urgency with suspected hypertensive encephalopathy, POA.  2.  Acute and now resolved  transient ischemic attack, secondary to #1, present on admission.  2.  Dyslipidemia.  3.  Essential hypertension.  4.  Chronic kidney disease stage III.  5.  Anemia of chronic kidney disease.     History of Present Illness/Hospital Course:  The patient is an 87-year-old lady with history of TIA, dyslipidemia, hypertension, GERD.  She presented to the emergency room for facial droop that occurred earlier in the evening and she was admitted last night to the hospitalist service.  She admitted to accidentally forgetting to take her Aggrenox.  She had no symptoms of syncope or dizziness or lightheadedness.  There is no acute injury known.  Her blood pressure was elevated at 202/81.  Blood work showed her creatinine was near baseline.  Her CT scan of the head showed no acute abnormality.   telemedicine neurology reviewed the case and the patient did not have an acute stroke.  A CT scan angiogram of the head and neck and brain were recommended with no significant stenosis.  Chest x-ray shows no acute process.  The hospitalist service followed her overnight with telemetry.  No acute telemetry events were noted.    The patient was fairly feisty during her hospital stay, demanding to be discharged home today.  She did see physical and Occupational Therapy with no acute deficits seen.  She had a 2D echo reviewed with no acute cardiac issues. See report listed below.  Her blood pressure was uncontrolled so she was started on Norvasc and she was restarted on her Aggrenox.  We discussed that forgetting her  medications could cause recurrent TIA and even possibly stroke.  She reported understanding.  She stated that her son would help take care of her at home.  The patient is medically improved and stable for discharge home.  She agreed to follow-up with primary care physician in less than 1 week.    Consults:     Consults     No orders found for last 30 day(s).          Procedures Performed: none             Vital Signs:    Temp:  [97.8 °F (36.6 °C)-98.7 °F (37.1 °C)] 97.8 °F (36.6 °C)  Heart Rate:  [64-87] 65  Resp:  [16-20] 18  BP: (133-202)/(42-81) 134/54    Physical Exam:      General Appearance:    Elderly lady. Has short grey hair put up into 2 dog eared pigtails. Alert, cooperative, in no acute distress. Mildly agitated and feisty   Head:    Normocephalic, without obvious abnormality, atraumatic   Eyes:            Lids and lashes normal, conjunctivae and sclerae normal, no   icterus, no pallor, corneas clear, PERRLA   Ears:    Ears appear intact with no abnormalities noted   Throat:   No oral lesions, no thrush, oral mucosa moist   Neck:   No adenopathy, supple, trachea midline, no thyromegaly, no     carotid bruit, no JVD   Back:     No kyphosis present, no scoliosis present, no skin lesions,       erythema or scars, no tenderness to percussion or                   palpation,   range of motion normal   Lungs:     Clear to auscultation,respirations regular, even and                   unlabored    Heart:    Regular rhythm and normal rate, normal S1 and S2, no            murmur, no gallop, no rub, no click   Breast Exam:    Deferred   Abdomen:     Normal bowel sounds, no masses, no organomegaly, soft        non-tender, non-distended, no guarding, no rebound                 tenderness   Genitalia:    Deferred   Extremities:   Moves all extremities well, no edema, no cyanosis, no              redness   Pulses:   Pulses palpable and equal bilaterally   Skin:   No bleeding, bruising or rash   Lymph nodes:   No  palpable adenopathy   Neurologic:   No tremor, sensation intact, DTR        present and equal bilaterally         Pertinent Lab Results:     Results from last 7 days   Lab Units 09/16/20  0634 09/15/20  1808   SODIUM mmol/L 143 141   POTASSIUM mmol/L 4.2 5.0   CHLORIDE mmol/L 112* 101   CO2 mmol/L 21.0* 23.6   BUN mg/dL 31* 39*   CREATININE mg/dL 1.26* 1.48*   CALCIUM mg/dL 8.3* 9.0   BILIRUBIN mg/dL  --  0.3   ALK PHOS U/L  --  66   ALT (SGPT) U/L  --  10   AST (SGOT) U/L  --  16   GLUCOSE mg/dL 108* 108*     Results from last 7 days   Lab Units 09/16/20  0634 09/15/20  1808   WBC 10*3/mm3 6.07 7.59   HEMOGLOBIN g/dL 9.2* 10.6*   HEMATOCRIT % 27.7* 32.9*   PLATELETS 10*3/mm3 209 238     Results from last 7 days   Lab Units 09/15/20  1853   INR  1.05     Results from last 7 days   Lab Units 09/16/20  0634 09/15/20  1853   TROPONIN T ng/mL <0.010 <0.010                           Invalid input(s): USDES,  BLOODU, NITRITITE, BACT, EP  Pain Management Panel     There is no flowsheet data to display.              Pertinent Radiology Results:    Imaging Results (All)     Procedure Component Value Units Date/Time    XR Chest 2 View [[id number]] Collected: 09/16/20 0922     Updated: 09/16/20 1150    Narrative:      PROCEDURE: XR CHEST 2 VW-        HISTORY: facial numbness     COMPARISON: July 2012.     FINDINGS: The heart is normal in size. The mediastinum is unremarkable.  The lungs are clear. There is no pneumothorax. There are no acute  osseous abnormalities.       Impression:      No acute cardiopulmonary process.                       Images were reviewed, interpreted, and dictated by Dr. Yomi Salguero M.D.  Transcribed by Francisca Aj PA-C.     This report was finalized on 9/16/2020 11:48 AM by Yomi Salguero M.D..    CT Angiogram Head [[id number]] Collected: 09/15/20 2119     Updated: 09/15/20 2120    Narrative:      FINAL REPORT    TECHNIQUE:  Thin section axial CT with contrast and  multiplanar  reconstruction.    CLINICAL HISTORY:  facial droop, stroke sx    FINDINGS:  CTA HEAD/BRAIN:  The noncontrast CT of the head is unremarkable.  The intracranial portions of the internal carotid arteries are  unremarkable, except for vascular calcification. The anterior  and middle cerebral arteries are unremarkable.  The intracranial  left vertebral artery is dominant.  The vertebral and basilar  arteries are otherwise normal.  The right posterior cerebral  artery is mainly supplied from the anterior, a normal variant.      Impression:      CTA HEAD/BRAIN: No significant arterial abnormality.    Authenticated by Jean Carlos Martin M.D. on 09/15/2020 09:19:25 PM    CT Angiogram Neck [[id number]] Collected: 09/15/20 2119     Updated: 09/15/20 2120    Narrative:      FINAL REPORT    TECHNIQUE:  Axial images through the neck were obtained following IV  contrast administration.    CLINICAL HISTORY:  facial droop, stroke sx    FINDINGS:  Nonvascular: The nasopharynx, pharynx, and larynx are normal.  There is no mass or adenopathy. The visualized sinuses are  clear. There is no osseous abnormality.  Vascular: There is  atherosclerotic calcification of the abdominal aorta, but no  significant stenosis involving the origin of the great vessels.  Right carotid: There is atherosclerotic calcification at the  carotid bulb, but no significant ICA or CCA stenosis.  Left  carotid: There is less than 50% stenosis involving the proximal  ICA.  No other ICA or CCA stenosis is seen.  Vertebrals: The  left vertebral artery is dominant, both vertebral arteries are  patent with no stenosis.      Impression:      Mild proximal left ICA stenosis otherwise no significant  arterial abnormality.    Authenticated by Jean Carlos Martin M.D. on 09/15/2020 09:19:25 PM    CT Head Without Contrast [[id number]] Collected: 09/15/20 1942     Updated: 09/15/20 1943    Narrative:      FINAL REPORT    TECHNIQUE:  Routine axial images through the head were  obtained without  contrast.    CLINICAL HISTORY:  facial swelling, numbness on left    FINDINGS:  The ventricles are mildly dilated, proportionate to the degree  of generalized mild atrophy.  Mild periventricular and deep  white matter low-attenuation areas are consistent with chronic  small vessel ischemia.  There is no mass or shift in midline  structures.  There is no intracranial hemorrhage.  There is no  acute sinus or osseous abnormality.      Impression:      No acute intracranial abnormality.    Authenticated by Jean Carlos Martin M.D. on 09/15/2020 07:42:07 PM          ECHO:    Results for orders placed during the hospital encounter of 09/15/20   Adult Transthoracic Echo Complete W/ Cont if Necessary Per Protocol    Narrative 1.  Normal left ventricular size and systolic function, LVEF 65-70%.  2.  Indeterminate LV diastolic function.  3.  Normal right ventricular size and systolic function.  4.  Normal left atrial volume index.  5.  Mild calcification of the aortic valve without significant stenosis.  6.  Mild mitral annular calcification with trace MR.  7.  Negative bubble study.       Condition on Discharge:  Stable        Discharge Disposition:    Home or Self Care    Discharge Medication:       Discharge Medications      New Medications      Instructions Start Date   amLODIPine 5 MG tablet  Commonly known as: NORVASC   5 mg, Oral, Nightly         Continue These Medications      Instructions Start Date   aspirin-dipyridamole  MG per 12 hr capsule  Commonly known as: AGGRENOX   1 capsule, Oral, 2 Times Daily      atorvastatin 20 MG tablet  Commonly known as: LIPITOR   20 mg, Oral, Nightly      baclofen 10 MG tablet  Commonly known as: LIORESAL   5 mg, Oral, 2 Times Daily      cetirizine 10 MG tablet  Commonly known as: zyrTEC   5 mg, Oral, Daily      cholecalciferol 25 MCG (1000 UT) tablet  Commonly known as: VITAMIN D3   1,000 Units, Oral, Daily      lisinopril 20 MG tablet  Commonly known as:  PRINIVIL,ZESTRIL   20 mg, Oral, Daily      omeprazole 20 MG capsule  Commonly known as: priLOSEC   take 1 capsule by mouth once daily             Discharge Diet:     Diet Instructions     Diet: Cardiac      Discharge Diet: Cardiac          Activity at Discharge:     Activity Instructions     Activity as Tolerated            Follow-up Appointments:    No future appointments.  Additional Instructions for the Follow-ups that You Need to Schedule     Discharge Follow-up with PCP   As directed       Currently Documented PCP:    Serjio Jackson MD    PCP Phone Number:    [phone]     Follow Up Details: Less than one week               Test Results Pending at Discharge: none           Stefania Ku DO  09/16/20  12:25 EDT      Medication risks and benefits were discussed in great detail. The patient reported being satisfied with the current treatment plan and the care delivered while hospitalized.     Time:  I spent 21  minutes preparing discharge counseling and teaching.

## 2020-09-16 NOTE — PLAN OF CARE
Problem: Adult Inpatient Plan of Care  Goal: Plan of Care Review  Flowsheets (Taken 9/16/2020 0927)  Progress: no change  Plan of Care Reviewed With: patient  Outcome Summary: PT eval completed.   Patient able to perform all mobility, including ambulating 412', with I/SBA.  No further skilled PT intervention indicated for this patient.

## 2020-09-16 NOTE — THERAPY DISCHARGE NOTE
Patient Name: Tanya Elise  : 1933    MRN: [id number]                              Today's Date: 2020       Admit Date: 9/15/2020    Visit Dx:     ICD-10-CM ICD-9-CM   1. TIA (transient ischemic attack)  G45.9 435.9   2. Impaired mobility and ADLs  Z74.09 V49.89    Z78.9      Patient Active Problem List   Diagnosis   • Pure hypercholesterolemia   • Essential hypertension   • Temporary cerebral vascular dysfunction   • Esophageal reflux   • BMI 35.0-35.9,adult   • Morbidly obese (CMS/HCC)   • Diarrhea   • TIA (transient ischemic attack)   • Hypertensive urgency   • Chronic kidney disease, stage III (moderate) (CMS/[facility])     Past Medical History:   Diagnosis Date   • Acid reflux    • Allergic    • Cataract    • Cerumen impaction    • Dizziness    • High cholesterol    • Hypertension    • Impaired functional mobility, balance, gait, and endurance    • Influenza    • Reflux gastritis    • Seasonal allergies    • Spinal headache    • Stroke (CMS/[facility])    • Transient ischemic attack    • Wears glasses      Past Surgical History:   Procedure Laterality Date   • APPENDECTOMY     • BREAST BIOPSY Left    • CATARACT EXTRACTION Bilateral    •  SECTION      X2   • COLONOSCOPY     • DILATATION AND CURETTAGE     • EYE CAPSULOTOMY WITH LASER Right 3/20/2017    Procedure: RIGHT EYE CAPSULOTOMY WITH LASER;  Surgeon: Ev Cortez MD;  Location: [facility];  Service:    • HYSTERECTOMY     • NASAL SEPTUM SURGERY     • TOOTH EXTRACTION       General Information     Row Name 20          Physical Therapy Time and Intention    Document Type  discharge evaluation/summary  -LM     Mode of Treatment  physical therapy  -LM     Row Name 20          General Information    Patient Profile Reviewed  yes  -LM     Prior Level of Function  independent:;community mobility  -LM     Existing Precautions/Restrictions  fall  -LM     Barriers to Rehab  none identified  -LM     Row Name 20           Living Environment    Lives With  child(urban), adult  -LM     Row Name 09/16/20 0927          Home Main Entrance    Number of Stairs, Main Entrance  three  -LM     Row Name 09/16/20 0927          Stairs Within Home, Primary    Stairs, Within Home, Primary  Stairs to 2nd level of home; patient does not have to access.  -LM     Row Name 09/16/20 0927          Cognition    Orientation Status (Cognition)  oriented x 4  -LM     Row Name 09/16/20 0927          Safety Issues, Functional Mobility    Safety Issues Affecting Function (Mobility)  safety precautions follow-through/compliance  -LM     Impairments Affecting Function (Mobility)  endurance/activity tolerance  -LM       User Key  (r) = Recorded By, (t) = Taken By, (c) = Cosigned By    Initials Name Provider Type    Emy Cooper, JARRED Physical Therapist        Mobility     Row Name 09/16/20 0927          Bed Mobility    Bed Mobility  supine-sit  -LM     Supine-Sit [place] (Bed Mobility)  modified independence  -LM     Assistive Device (Bed Mobility)  bed rails;head of bed elevated  -LM     Row Name 09/16/20 0927          Transfers    Comment (Transfers)  Toilet transfer with SBA.  -LM     Row Name 09/16/20 0927          Sit-Stand Transfer    Sit-Stand [place] (Transfers)  supervision  -     Row Name 09/16/20 0927          Gait/Stairs (Locomotion)    [place] Level (Gait)  supervision  -LM     Distance in Feet (Gait)  412'  -LM       User Key  (r) = Recorded By, (t) = Taken By, (c) = Cosigned By    Initials Name Provider Type    Emy Cooper, JARRED Physical Therapist        Obj/Interventions     Row Name 09/16/20 0927          Strength Comprehensive (MMT)    General Manual Muscle Testing (MMT) Assessment  no strength deficits identified  -LM     Row Name 09/16/20 0927          Balance    Balance Assessment  sitting static balance;sitting dynamic balance;standing static balance;standing dynamic balance  -LM     Static Sitting Balance  WFL;sitting,  edge of bed  -LM     Dynamic Sitting Balance  WFL;sitting, edge of bed  -LM     Static Standing Balance  WFL;unsupported  -LM     Dynamic Standing Balance  WFL;unsupported  -LM     Row Name 09/16/20 0927          Sensory Assessment (Somatosensory)    Sensory Assessment (Somatosensory)  sensation intact  -LM       User Key  (r) = Recorded By, (t) = Taken By, (c) = Cosigned By    Initials Name Provider Type    LM Emy Velasquez, PT Physical Therapist        Goals/Plan    No documentation.       Clinical Impression     Row Name 09/16/20 0927          Pain    Additional Documentation  Pain Scale: Numbers Pre/Post-Treatment (Group)  -LM     Row Name 09/16/20 0927          Pain Scale: Numbers Pre/Post-Treatment    Pretreatment Pain Rating  0/10 - no pain  -LM     Posttreatment Pain Rating  0/10 - no pain  -LM     Row Name 09/16/20 0927          Plan of Care Review    Plan of Care Reviewed With  patient  -LM     Progress  no change  -LM     Row Name 09/16/20 0927          Therapy Assessment/Plan (PT)    Patient/Family Therapy Goals Statement (PT)  Return home.  -LM     Criteria for Skilled Interventions Met (PT)  no;skilled treatment is necessary;no problems identified which require skilled intervention  -LM     Row Name 09/16/20 0927          Vital Signs    O2 Delivery Pre Treatment  room air  -LM     O2 Delivery Intra Treatment  room air  -LM     O2 Delivery Post Treatment  room air  -LM       User Key  (r) = Recorded By, (t) = Taken By, (c) = Cosigned By    Initials Name Provider Type    Emy Cooper, PT Physical Therapist        Outcome Measures     Row Name 09/16/20 0927          How much help from another person do you currently need...    Turning from your back to your side while in flat bed without using bedrails?  4  -LM     Moving from lying on back to sitting on the side of a flat bed without bedrails?  4  -LM     Moving to and from a bed to a chair (including a wheelchair)?  4  -LM     Standing up from a  chair using your arms (e.g., wheelchair, bedside chair)?  4  -LM     Climbing 3-5 steps with a railing?  3  -LM     To walk in hospital room?  4  -LM     AM-PAC 6 Clicks Score (PT)  23  -LM     Row Name 09/16/20 0927          Functional Assessment    Outcome Measure Options  AM-PAC 6 Clicks Basic Mobility (PT)  -       User Key  (r) = Recorded By, (t) = Taken By, (c) = Cosigned By    Initials Name Provider Type     Emy Velasquez, JARRED Physical Therapist        Physical Therapy Education                 Title: PT OT SLP Therapies (In Progress)     Topic: Physical Therapy (Done)     Point: Mobility training (Done)     Learning Progress Summary           Patient Acceptance, E,TB, VU by  at 9/16/2020 1200    Comment: Purpose of PT.                   Point: Home exercise program (Done)     Learning Progress Summary           Patient Acceptance, E,TB, VU by  at 9/16/2020 1200    Comment: Purpose of PT.                   Point: Precautions (Done)     Learning Progress Summary           Patient Acceptance, E,TB, VU by  at 9/16/2020 1200    Comment: Purpose of PT.                               User Key     Initials Effective Dates Name Provider Type [facility] 04/03/18 -  Emy Velasquez, JARRED Physical Therapist PT              PT Recommendation and Plan     Plan of Care Reviewed With: patient  Progress: no change  Outcome Summary: PT eval completed.   Patient able to perform all mobility, including ambulating 412', with I/SBA.  No further skilled PT intervention indicated for this patient.     Time Calculation:   PT Charges     Row Name 09/16/20 1201             Time Calculation    Start Time  0927  -      PT Received On  09/16/20  -        User Key  (r) = Recorded By, (t) = Taken By, (c) = Cosigned By    Initials Name Provider Type     Emy Velasquez, JARRED Physical Therapist        Therapy Charges for Today     Code Description Service Date Service Provider Modifiers Qty    [id number] HC PT EVAL LOW  COMPLEXITY 3 9/16/2020 Emy Velasquez, PT GP 1          PT G-Codes  Outcome Measure Options: AM-PAC 6 Clicks Basic Mobility (PT)  AM-PAC 6 Clicks Score (PT): 23  AM-PAC 6 Clicks Score (OT): 23    PT Discharge Summary  Anticipated Discharge Disposition (PT): home with assist    Emy Velasquez, PT  9/16/2020

## 2020-09-16 NOTE — THERAPY EVALUATION
Acute Care - Occupational Therapy Initial Evaluation   [facility]     Patient Name: Tanya Elise  : 1933  MRN: [id number]  Today's Date: 2020  Onset of Illness/Injury or Date of Surgery: 09/15/20  Date of Referral to OT: 09/15/20  Referring Physician: Dr. Cherry    Admit Date: 9/15/2020       ICD-10-CM ICD-9-CM   1. TIA (transient ischemic attack)  G45.9 435.9   2. Impaired mobility and ADLs  Z74.09 V49.89    Z78.9      Patient Active Problem List   Diagnosis   • Pure hypercholesterolemia   • Essential hypertension   • Temporary cerebral vascular dysfunction   • Esophageal reflux   • BMI 35.0-35.9,adult   • Morbidly obese (CMS/HCC)   • Diarrhea   • TIA (transient ischemic attack)   • Hypertensive urgency   • Chronic kidney disease, stage III (moderate) (CMS/HCC)     Past Medical History:   Diagnosis Date   • Acid reflux    • Allergic    • Cataract    • Cerumen impaction    • Dizziness    • High cholesterol    • Hypertension    • Influenza    • Reflux gastritis    • Seasonal allergies    • Spinal headache    • Stroke (CMS/HCC)    • Transient ischemic attack    • Wears glasses      Past Surgical History:   Procedure Laterality Date   • APPENDECTOMY     • BREAST BIOPSY Left    • CATARACT EXTRACTION Bilateral    •  SECTION      X2   • COLONOSCOPY     • DILATATION AND CURETTAGE     • EYE CAPSULOTOMY WITH LASER Right 3/20/2017    Procedure: RIGHT EYE CAPSULOTOMY WITH LASER;  Surgeon: Ev Cortez MD;  Location: [facility];  Service:    • HYSTERECTOMY     • NASAL SEPTUM SURGERY     • TOOTH EXTRACTION            OT ASSESSMENT FLOWSHEET (last 12 hours)      OT Evaluation and Treatment     Row Name 20 0920                   OT Time and Intention    Subjective Information  no complaints  -SD        Document Type  evaluation;discharge evaluation/summary  -SD        Mode of Treatment  occupational therapy  -SD        Patient Effort  good  -SD        Symptoms Noted During/After Treatment  none  -SD            General Information    Patient Profile Reviewed  yes  -SD        Onset of Illness/Injury or Date of Surgery  09/15/20  -SD        Referring Physician  Dr. Cherry  -SD        General Observations of Patient  Supine in bed  -SD        Prior Level of Function  independent:;all household mobility;community mobility  -SD        Equipment Currently Used at Home  cane, straight;shower chair;walker, rolling  -SD        Pertinent History of Current Functional Problem  Hypertensive urgency; TIA, dyslipidemia, GERD, CKD, anemia  -SD        Existing Precautions/Restrictions  fall  -SD        Risks Reviewed  patient:;increased discomfort  -SD        Benefits Reviewed  patient:;improve function;increase independence;increase strength;increase balance  -SD        Barriers to Rehab  none identified  -SD           Occupational Profile    Reason for Services/Referral (Occupational Profile)  ADL decline  -SD           Previous Level of Function/Home Environm    BADLs, Premorbid Functional Level  independent  -SD        IADLs, Premorbid Functional Level  independent  -SD           Living Environment    Current Living Arrangements  home/apartment/condo  -SD        Lives With  child(urban), adult  -SD           Home Use of Assistive/Adaptive Equipment    Equipment Currently Used at Home  cane, straight  -SD           Cognition    Orientation Status (Cognition)  oriented x 4  -SD        Follows Commands (Cognition)  verbal cues/prompting required  -SD        Personal Safety Interventions  fall prevention program maintained;gait belt;nonskid shoes/slippers when out of bed  -SD           Pain Scale: Numbers Pre/Post-Treatment    Pretreatment Pain Rating  0/10 - no pain  -SD        Posttreatment Pain Rating  0/10 - no pain  -SD           Range of Motion (ROM)    Range of Motion  bilateral upper extremities;ROM is WFL  -SD           Range of Motion Comprehensive    General Range of Motion  bilateral upper extremity ROM WFL  -SD         Comment, General Range of Motion  4-/5  -SD           Strength (Manual Muscle Testing)    Strength (Manual Muscle Testing)  bilateral upper extremities;strength is WFL  -SD           Bed Mobility    Bed Mobility  supine-sit  -SD        Supine-Sit Pitcher (Bed Mobility)  independent  -SD           Functional Mobility    Functional Mobility- Ind. Level  supervision required  -SD        Functional Mobility-Distance (Feet)  412  -SD           Transfer Assessment/Treatment    Transfers  sit-stand transfer;stand-sit transfer;toilet transfer  -SD           Transfers    Sit-Stand Pitcher (Transfers)  supervision  -SD        Stand-Sit Pitcher (Transfers)  supervision  -SD        Pitcher Level (Toilet Transfer)  supervision  -SD        Assistive Device (Toilet Transfer)  commode  -SD           Toilet Transfer    Type (Toilet Transfer)  stand pivot/stand step  -SD           Safety Issues, Functional Mobility    Impairments Affecting Function (Mobility)  endurance/activity tolerance  -SD           Activities of Daily Living    BADL Assessment/Intervention  bathing;lower body dressing;upper body dressing;grooming;toileting;feeding  -SD           Bathing Assessment/Intervention    Pitcher Level (Bathing)  supervision  -SD           Upper Body Dressing Assessment/Training    Pitcher Level (Upper Body Dressing)  independent  -SD           Lower Body Dressing Assessment/Training    Pitcher Level (Lower Body Dressing)  independent  -SD           Grooming Assessment/Training    Pitcher Level (Grooming)  independent  -SD           Self-Feeding Assessment/Training    Pitcher Level (Feeding)  independent  -SD           Toileting Assessment/Training    Pitcher Level (Toileting)  supervision;set up  -SD           BADL Safety/Performance    Impairments, BADL Safety/Performance  endurance/activity tolerance  -SD           Coping    Observed Emotional State  cooperative  -SD        Verbalized  Emotional State  acceptance  -SD           Plan of Care Review    Plan of Care Reviewed With  patient  -SD        Progress  improving  -SD        Outcome Summary  OT eval completed. Patient completed bed mobility independently, transfers and functional mobility with no AD and sup x 412'. Requires S/U-Sup for ADLs. No OT needs at this time.   -SD           Positioning and Restraints    Pre-Treatment Position  in bed  -SD        Post Treatment Position  chair  -SD        In Chair  sitting;call light within reach;encouraged to call for assist  -SD           Therapy Assessment/Plan (OT)    Date of Referral to OT  09/15/20  -SD        Patient/Family Therapy Goal Statement (OT)  Return home  -SD        OT Diagnosis  ADL decline  -SD        Rehab Potential (OT)  good, to achieve stated therapy goals  -SD        Criteria for Skilled Therapeutic Interventions Met (OT)  no problems identified which require skilled intervention  -SD        Therapy Frequency (OT)  evaluation only  -SD           Evaluation Complexity (OT)    Review Occupational Profile/Medical/Therapy History Complexity  low complexity  -SD        Assessment, Occupational Performance/Identification of Deficit Complexity  low complexity  -SD        Clinical Decision Making Complexity (OT)  low complexity  -SD        Overall Complexity of Evaluation (OT)  low complexity  -SD           Progress Summary (OT)    Progress Toward Functional Goals (OT)  progress toward functional goals as expected  -SD           Discharge Summary (OT)    Additional Documentation  Discharge Summary (OT) (Group)  -SD           Discharge Summary (OT)    Reason for Discharge (OT)  no further needs identified  -SD        Discharge Summary Statement (OT)  Patient does not require OT services a this time.  -SD          User Key  (r) = Recorded By, (t) = Taken By, (c) = Cosigned By    Initials Name Effective Dates    Tamera Reyna OT 03/07/18 -          Occupational Therapy Education                  Title: PT OT SLP Therapies (In Progress)     Topic: Occupational Therapy (In Progress)     Point: ADL training (Done)     Description:   Instruct learner(s) on proper safety adaptation and remediation techniques during self care or transfers.   Instruct in proper use of assistive devices.              Learning Progress Summary           Patient Acceptance, E,TB, VU by SD at 9/16/2020 1108    Comment: Benefit of OT; OT POC; no OT needs                   Point: Home exercise program (Not Started)     Description:   Instruct learner(s) on appropriate technique for monitoring, assisting and/or progressing therapeutic exercises/activities.              Learner Progress:  Not documented in this visit.          Point: Precautions (Not Started)     Description:   Instruct learner(s) on prescribed precautions during self-care and functional transfers.              Learner Progress:  Not documented in this visit.          Point: Body mechanics (Not Started)     Description:   Instruct learner(s) on proper positioning and spine alignment during self-care, functional mobility activities and/or exercises.              Learner Progress:  Not documented in this visit.                      User Key     Initials Effective Dates Name Provider Type Discipline    SD 03/07/18 -  Tamera Ashley OT Occupational Therapist OT                  OT Recommendation and Plan  Therapy Frequency (OT): evaluation only  Progress Toward Functional Goals (OT): progress toward functional goals as expected  Plan of Care Review  Plan of Care Reviewed With: patient  Progress: improving  Outcome Summary: OT eval completed. Patient completed bed mobility independently, transfers and functional mobility with no AD and sup x 412'. Requires S/U-Sup for ADLs. No OT needs at this time.   Plan of Care Reviewed With: patient  Outcome Summary: OT eval completed. Patient completed bed mobility independently, transfers and functional mobility with no AD  and sup x 412'. Requires S/U-Sup for ADLs. No OT needs at this time.     Outcome Measures     Row Name 09/16/20 0920             How much help from another is currently needed...    Putting on and taking off regular lower body clothing?  4  -SD      Bathing (including washing, rinsing, and drying)  3  -SD      Toileting (which includes using toilet bed pan or urinal)  4  -SD      Putting on and taking off regular upper body clothing  4  -SD      Taking care of personal grooming (such as brushing teeth)  4  -SD      Eating meals  4  -SD      AM-PAC 6 Clicks Score (OT)  23  -SD         Functional Assessment    Outcome Measure Options  AM-PAC 6 Clicks Daily Activity (OT)  -SD        User Key  (r) = Recorded By, (t) = Taken By, (c) = Cosigned By    Initials Name Provider Type    Tamera Reyna OT Occupational Therapist          Time Calculation:   Time Calculation- OT     Row Name 09/16/20 1109             Time Calculation- OT    OT Start Time  0920  -SD      OT Received On  09/16/20  -SD        User Key  (r) = Recorded By, (t) = Taken By, (c) = Cosigned By    Initials Name Provider Type    Tamera Reyna OT Occupational Therapist        Therapy Charges for Today     Code Description Service Date Service Provider Modifiers Qty    [id number]  OT EVAL LOW COMPLEXITY 3 9/16/2020 Tamera Ashley OT GO 1               Tamera Ashley OT  9/16/2020

## 2020-09-16 NOTE — PLAN OF CARE
Problem: Adult Inpatient Plan of Care  Goal: Plan of Care Review  Outcome: Ongoing, Progressing  Flowsheets (Taken 9/16/2020 1052)  Progress: improving  Plan of Care Reviewed With: patient  Outcome Summary: Bedside eval of swallow completed.  Oral phase was WFL with all trials.  No overt s/s aspiration with any consistency.  RN reported no observed difficulty and pt. denied dysphagia.  Pt. does have prior dx of GERD.  Recommend:  1. continue reg diet with thin liq as dipti,  2. meds whole as dipti,  3. aspiration precautions,  4. reflux precautions.

## 2020-09-16 NOTE — CONSULTS
Stroke Consult Note    Patient Name: Tanya Elise   MRN: [id number]  Age: 87 y.o.  Sex: female  : 1933    Primary Care Physician: Serjio Jackson MD  Referring Physician:  No ref. provider found        Chief Complaint/Reason for Consultation: Left facial droop    Subjective:    HPI:  History obtained from patient and from medical staff, No family members at bedside.  No  recent history of stroke or transient ischemic attack, no history of seizures or passing out. No history of focal weakness suggesting relapsing remitting MS. No history of migraine, no neck stiffness or fever.  Patient taken no anticoagulation therapy or antiplatelet therapy.  No history of  head trauma or meningitis , no  strong family history of epilepsy.  No strong family history of aneurysm. No recent change in patient daily routine.  No recent new medication , symptoms started unprovoked.Yesterday patient presented with elevated blood pressure associated with left facial numbness, no other focal weakness, secondary to low NIH score, CT angiogram instead TPA was performed, today patient symptoms resolved and back to her baseline.      ROS: 12 point of review system negative except as above.    Past Medical History:   Diagnosis Date   • Acid reflux    • Allergic    • Cataract    • Cerumen impaction    • Dizziness    • High cholesterol    • Hypertension    • Influenza    • Reflux gastritis    • Seasonal allergies    • Spinal headache    • Stroke (CMS/HCC)    • Transient ischemic attack    • Wears glasses      Past Surgical History:   Procedure Laterality Date   • APPENDECTOMY     • BREAST BIOPSY Left    • CATARACT EXTRACTION Bilateral    •  SECTION      X2   • COLONOSCOPY     • DILATATION AND CURETTAGE     • EYE CAPSULOTOMY WITH LASER Right 3/20/2017    Procedure: RIGHT EYE CAPSULOTOMY WITH LASER;  Surgeon: Ev Cortez MD;  Location: [facility];  Service:    • HYSTERECTOMY     • NASAL SEPTUM SURGERY     • TOOTH EXTRACTION        Family History   Problem Relation Age of Onset   • Diabetes Son      Social History     Socioeconomic History   • Marital status:      Spouse name: Not on file   • Number of children: Not on file   • Years of education: Not on file   • Highest education level: Not on file   Tobacco Use   • Smoking status: Never Smoker   • Smokeless tobacco: Never Used   Substance and Sexual Activity   • Alcohol use: No   • Drug use: No   • Sexual activity: Defer       Allergies   Allergen Reactions   • Penicillins Rash     Prior to Admission medications    Medication Sig Start Date End Date Taking? Authorizing Provider   aspirin-dipyridamole (AGGRENOX)  MG per 12 hr capsule Take 1 capsule by mouth 2 (Two) Times a Day. 4/27/20  Yes Serjio Jackson MD   baclofen (LIORESAL) 10 MG tablet Take 0.5 tablets by mouth 2 (Two) Times a Day. 8/28/20  Yes Aileen Cristina APRN   cetirizine (zyrTEC) 10 MG tablet Take 0.5 tablets by mouth Daily. 10/28/19  Yes Aileen Grande APRN   cholecalciferol (VITAMIN D3) 1000 UNITS tablet Take 1,000 Units by mouth Daily.   Yes ProviderKelsey MD   lisinopril (PRINIVIL,ZESTRIL) 20 MG tablet Take 1 tablet by mouth Daily. 9/17/19  Yes Aileen Grande APRN   omeprazole (PriLOSEC) 20 MG capsule take 1 capsule by mouth once daily 10/11/16  Yes Serjio Jackson MD   atorvastatin (LIPITOR) 20 MG tablet Take 1 tablet by mouth Every Night. 9/18/19   Aileen Grande APRN       Objective:    Temp:  [98 °F (36.7 °C)-98.7 °F (37.1 °C)] 98 °F (36.7 °C)  Heart Rate:  [64-87] 68  Resp:  [16-20] 18  BP: (133-202)/(42-81) 133/55    Neuro Exam:     NIHSS    NIH Stroke Scale  Time: 10:31 EDT  Person Administering Scale: Basher A Mohsen, MD    1a  Level of consciousness: 0=alert; keenly responsive   1b. LOC questions:  0=Performs both tasks correctly   1c. LOC commands: 0=Performs both tasks correctly   2.  Best Gaze: 0=normal   3.  Visual: 0=No visual loss   4. Facial Palsy:  0=Normal symmetric movement   5a.  Motor left arm: 0=No drift, limb holds 90 (or 45) degrees for full 10 seconds   5b.  Motor right arm: 0=No drift, limb holds 90 (or 45) degrees for full 10 seconds   6a. motor left le=No drift, limb holds 90 (or 45) degrees for full 10 seconds   6b  Motor right le=No drift, limb holds 90 (or 45) degrees for full 10 seconds   7. Limb Ataxia: 0=Absent   8.  Sensory: 0=Normal; no sensory loss   9. Best Language:  0=No aphasia, normal   10. Dysarthria: 0=Normal   11. Extinction and Inattention: 0=No abnormality    Total:   0       This was an audio and video enabled telemedicine encounter.       Physical Exam:  Awake and follow simple command, Track objects all directions. No visual filed cut . Pupils equal an reactive. No clear facial droop. No nystagmus. Verbal, Power: Move all extremities.Did good finger nose finger. Normal sensory examination to light touch. DTR symmetric. no pathological reflexes. Didn't evaluate gait at this time.    Hospital Meds:  Scheduled- amLODIPine, 5 mg, Oral, Nightly  aspirin-dipyridamole, 1 capsule, Oral, BID  atorvastatin, 20 mg, Oral, Nightly  baclofen, 5 mg, Oral, BID  cetirizine, 5 mg, Oral, Daily  enoxaparin, 30 mg, Subcutaneous, Q24H  lisinopril, 20 mg, Oral, Daily  pantoprazole, 40 mg, Oral, QAM  sodium chloride, 10 mL, Intravenous, Q12H  cholecalciferol, 1,000 Units, Oral, Daily      Infusions- Pharmacy to Dose enoxaparin (LOVENOX),        PRNs- •  acetaminophen **OR** acetaminophen **OR** acetaminophen  •  influenza vaccine  •  ondansetron  •  Pharmacy to Dose enoxaparin (LOVENOX)  •  sodium chloride    Results Reviewed:  I have personally reviewed current lab, radiology, and data and agree with results.    Radiology Results  Results for orders placed during the hospital encounter of 09/15/20   Adult Transthoracic Echo Complete W/ Cont if Necessary Per Protocol    Narrative 1.  Normal left ventricular size and systolic function, LVEF  65-70%.  2.  Indeterminate LV diastolic function.  3.  Normal right ventricular size and systolic function.  4.  Normal left atrial volume index.  5.  Mild calcification of the aortic valve without significant stenosis.  6.  Mild mitral annular calcification with trace MR.  7.  Negative bubble study.     Results for orders placed during the hospital encounter of 09/15/20   CT Angiogram Neck    Narrative FINAL REPORT    TECHNIQUE:  Axial images through the neck were obtained following IV  contrast administration.    CLINICAL HISTORY:  facial droop, stroke sx    FINDINGS:  Nonvascular: The nasopharynx, pharynx, and larynx are normal.  There is no mass or adenopathy. The visualized sinuses are  clear. There is no osseous abnormality.  Vascular: There is  atherosclerotic calcification of the abdominal aorta, but no  significant stenosis involving the origin of the great vessels.  Right carotid: There is atherosclerotic calcification at the  carotid bulb, but no significant ICA or CCA stenosis.  Left  carotid: There is less than 50% stenosis involving the proximal  ICA.  No other ICA or CCA stenosis is seen.  Vertebrals: The  left vertebral artery is dominant, both vertebral arteries are  patent with no stenosis.      Impression Mild proximal left ICA stenosis otherwise no significant  arterial abnormality.    Authenticated by Jean Carlos Martin M.D. on 09/15/2020 09:19:25 PM          Lab Results  Lab Results   Lab Value Date/Time    CHOL 232 (H) 09/16/2020 0634    HDL 42 09/16/2020 0634     (H) 09/16/2020 0634    TRIG 164 (H) 09/16/2020 0634     Results from last 7 days   Lab Units 09/16/20  0634 09/15/20  1808   WBC 10*3/mm3 6.07 7.59   HEMOGLOBIN g/dL 9.2* 10.6*   MCV fL 100.4* 102.5*   PLATELETS 10*3/mm3 209 238   NEUTROPHIL % % 49.8 51.4   LYMPHOCYTE % % 38.4 35.7   EOSINOPHIL % % 2.8 2.9   IMM GRAN % % 0.3 0.3   NEUTROS ABS 10*3/mm3 3.02 3.90   LYMPHS ABS 10*3/mm3 2.33 2.71   EOS ABS 10*3/mm3 0.17 0.22   IMMATURE  GRANS (ABS) 10*3/mm3 0.02 0.02     Results from last 7 days   Lab Units 09/16/20  0634   SODIUM mmol/L 143   POTASSIUM mmol/L 4.2   CHLORIDE mmol/L 112*   CO2 mmol/L 21.0*   BUN mg/dL 31*   CREATININE mg/dL 1.26*   GLUCOSE mg/dL 108*   CALCIUM mg/dL 8.3*       Assessment:  1.Transient ischemic attack.  2. Hypertensive emergency.    Plan:  Head CT noted.  CT angiogram brain and neck noted.  Echocardiogram noted.  Aspirin 325 and Lipitor 40.  MRI of the brain.    GI and DVT prophylaxis.  Stat head CT for any neurological changes.  Frequent Faheem checks every 4 to 6 hours and vital sign check.  Stroke Risk  factor modification to the primary care team(HTN,DM).  All patient questions and concerns answered in detail.      Time: 10:31 EDT  Person Administering Scale: Basher A Mohsen, MD        This was an audio and video enabled telemedicine encounter.       Electronically signed by Basher A Mohsen, MD, 09/16/20, 10:31 AM EDT.

## 2020-09-16 NOTE — PLAN OF CARE
Problem: Adult Inpatient Plan of Care  Goal: Plan of Care Review  Recent Flowsheet Documentation  Taken 9/16/2020 0920 by Tamera Ashley OT  Progress: improving  Plan of Care Reviewed With: patient  Outcome Summary: OT eval completed. Patient completed bed mobility independently, transfers and functional mobility with no AD and sup x 412'. Requires S/U-Sup for ADLs. No OT needs at this time.

## 2020-09-16 NOTE — PROGRESS NOTES
Discharge Planning Assessment  [facility]     Patient Name: Tanya Elise  MRN: [id number]  Today's Date: 9/16/2020    Admit Date: 9/15/2020    Discharge Needs Assessment     Row Name 09/16/20 1011       Living Environment    Lives With  child(urban), adult    Name(s) of Who Lives With Patient  Slade    Current Living Arrangements  home/apartment/condo    Primary Care Provided by  self    Provides Primary Care For  no one    Family Caregiver if Needed  child(urban), adult    Quality of Family Relationships  involved    Able to Return to Prior Arrangements  yes       Resource/Environmental Concerns    Resource/Environmental Concerns  none    Transportation Concerns  car, none       Transition Planning    Patient/Family Anticipates Transition to  home with family    Transportation Anticipated  family or friend will provide       Discharge Needs Assessment    Readmission Within the Last 30 Days  no previous admission in last 30 days    Equipment Currently Used at Home  cane, straight    Concerns to be Addressed  no discharge needs identified    Anticipated Changes Related to Illness  none    Provided Post Acute Provider List?  N/A    Provided Post Acute Provider Quality & Resource List?  N/A        Discharge Plan     Row Name 09/16/20 1013       Plan    Plan  Home with Family    Patient/Family in Agreement with Plan  yes    Plan Comments  Case Management spoke to pt regarding discharge plans Confirmed address and phone number She lives with her son uses a cane when outside the home  Son drives to [place] Does not Have  Living will gave her information regarding this Plan to return home        Continued Care and Services - Admitted Since 9/15/2020    Coordination has not been started for this encounter.         Demographic Summary     Row Name 09/16/20 1010       General Information    Admission Type  observation    Required Notices Provided  Observation Status Notice    Referral Source  admission list    Reason for  Consult  discharge planning        Functional Status     Row Name 09/16/20 1010       Functional Status    Usual Activity Tolerance  good       Functional Status, IADL    Medications  independent    Meal Preparation  independent    Housekeeping  independent    Laundry  independent    Shopping  independent       Mental Status    General Appearance WDL  WDL        Psychosocial    No documentation.       Abuse/Neglect    No documentation.       Legal     Row Name 09/16/20 1011       Financial/Legal    Finance Comments  Denies any needs with medication food utilities        Substance Abuse    No documentation.       Patient Forms    No documentation.           Brianna Benitez RN

## 2020-09-16 NOTE — ED NOTES
Contacted house supervisor for bed assignment, advised patient would be going to bed 427.     Dylan Alan  09/15/20 4404

## 2020-09-16 NOTE — OUTREACH NOTE
Prep Survey      Responses   [facility] patient discharged from?  [place]   Is LACE score < 7 ?  Yes   Eligibility  [facility]   Date of Admission  09/15/20   Date of Discharge  09/16/20   Discharge Disposition  Home or Self Care   Discharge diagnosis  Hypertensive urgency with suspected hypertensive encephalopathy,     Acute and now resolved  transient ischemic attack   COVID-19 Test Status  Not tested   Does the patient have one of the following disease processes/diagnoses(primary or secondary)?  Stroke (TIA)   Does the patient have Home health ordered?  No   Is there a DME ordered?  No   Prep survey completed?  Yes          Stefania Naqvi RN

## 2020-09-16 NOTE — H&P
[facility]   HISTORY AND PHYSICAL      Name:  Tanya Elise   Age:  87 y.o.  Sex:  female  :  1933  MRN:  [id number]   Visit Number:  [id number]  Admission Date:  9/15/2020  Date Of Service:  09/15/20  Primary Care Physician:  Serjio Jackson MD    Chief Complaint:     Facial droop.    History Of Presenting Illness:      This is an 87-year-old female with history of hypertension, TIA, dyslipidemia and GERD was brought to the emergency room by her son with complaints of facial droop that started earlier this evening.  The history is obtained from the patient who states that her son noticed her left side of the mouth deviated around 5:30 PM this evening.  Patient felt numbness on the lower half of her left face which subsequently was also subsequently included the top half of the face.  The symptoms lasted for a few minutes with tingling in her left face and she felt as if she was given a lidocaine injection just like to do at the dental office.  There was no history of any focal weakness.  No history of dizziness or loss of consciousness.  Patient states that her last TIA episode was more than 10 years ago and since then she has been on Aggrenox.  Patient however tells me that she forgot to take her Aggrenox last night.    In the emergency room, she was afebrile and hemodynamically stable except for elevated blood pressure of 202/81.  Pulse oxygen saturation was 99% on room air.  Blood work done in the emergency room including CMP and CBC was unremarkable except for a creatinine of 1.48 (baseline around 1.25), potassium of 5 and hemoglobin of 10.6.  INR was within normal limits.  Troponin were negative.  CT of the head was negative for any acute intracranial abnormality.  A call was placed to telemetry neurologist and the case was discussed.  CT angiogram of the head and neck and brain were recommended both of which did not show any significant stenosis.  Patient already  takes Aggrenox.  Patient was given hydralazine for elevated blood pressures.  Patient was subsequently admitted to the medical floor with telemetry for further evaluation and management.    Review Of Systems:     General ROS: Patient denies any fevers, chills or loss of consciousness.  Psychological ROS: No history of any hallucinations and delusions.  Ophthalmic ROS: No history of any diplopia or transient loss of vision.  ENT ROS: No history of sore throat, nasal congestion or ear pain.   Allergy and Immunology ROS: No history of rash or itching.  Hematological and Lymphatic ROS: No history of neck swelling or easy bleeding.  Endocrine ROS: No history of any recent unintentional weight gain or loss.  Respiratory ROS: No history of cough or shortness of breath.  Cardiovascular ROS: No history of chest pain or palpitations.  Gastrointestinal ROS: No history of nausea and vomiting. Denies any abdominal pain. No diarrhea.  Genito-Urinary ROS: No history of dysuria or hematuria.  Musculoskeletal ROS: No muscle pain. No calf pain. Complains of chronic back pain.  Neurological ROS: As per history of presenting illness.  Dermatological ROS: No history of any redness or pruritis.     Past Medical History:    Past Medical History:   Diagnosis Date   • Acid reflux    • Allergic    • Cataract    • Cerumen impaction    • Dizziness    • High cholesterol    • Hypertension    • Influenza    • Reflux gastritis    • Seasonal allergies    • Spinal headache    • Stroke (CMS/HCC)    • Transient ischemic attack    • Wears glasses      Past Surgical history:    Past Surgical History:   Procedure Laterality Date   • APPENDECTOMY     • BREAST BIOPSY Left    • CATARACT EXTRACTION Bilateral    •  SECTION      X2   • COLONOSCOPY     • DILATATION AND CURETTAGE     • EYE CAPSULOTOMY WITH LASER Right 3/20/2017    Procedure: RIGHT EYE CAPSULOTOMY WITH LASER;  Surgeon: Ev Cortez MD;  Location: [facility];  Service:    • HYSTERECTOMY      • NASAL SEPTUM SURGERY     • TOOTH EXTRACTION       Social History:    Social History     Socioeconomic History   • Marital status:      Spouse name: Not on file   • Number of children: Not on file   • Years of education: Not on file   • Highest education level: Not on file   Tobacco Use   • Smoking status: Never Smoker   • Smokeless tobacco: Never Used   Substance and Sexual Activity   • Alcohol use: No   • Drug use: No   • Sexual activity: Defer     Family History:    Family History   Problem Relation Age of Onset   • Diabetes Son      Allergies:      Penicillins    Home Medications:    Prior to Admission Medications     Prescriptions Last Dose Informant Patient Reported? Taking?    aspirin-dipyridamole (AGGRENOX)  MG per 12 hr capsule   No No    Take 1 capsule by mouth 2 (Two) Times a Day.    atorvastatin (LIPITOR) 20 MG tablet   No No    Take 1 tablet by mouth Every Night.    baclofen (LIORESAL) 10 MG tablet   No No    Take 0.5 tablets by mouth 2 (Two) Times a Day.    cetirizine (zyrTEC) 10 MG tablet   No No    Take 0.5 tablets by mouth Daily.    cholecalciferol (VITAMIN D3) 1000 UNITS tablet   Yes No    Take 1,000 Units by mouth Daily.    ciprofloxacin (CIPRO) 250 MG tablet   No No    Take 1 tablet by mouth 2 (Two) Times a Day.    lisinopril (PRINIVIL,ZESTRIL) 20 MG tablet   No No    Take 1 tablet by mouth Daily.    omeprazole (PriLOSEC) 20 MG capsule   No No    take 1 capsule by mouth once daily           ED Medications:    Medications   hydrALAZINE (APRESOLINE) injection 10 mg (10 mg Intravenous Given 9/15/20 1929)   sodium chloride 0.9 % bolus 1,000 mL (1,000 mL Intravenous New Bag 9/15/20 2037)   iopamidol (ISOVUE-300) 61 % injection 100 mL (100 mL Intravenous Given 9/15/20 2018)     Vital Signs:    Temp:  [98.7 °F (37.1 °C)] 98.7 °F (37.1 °C)  Heart Rate:  [64-80] 80  Resp:  [16-18] 18  BP: (166-202)/(54-81) 169/62        09/15/20  1751   Weight: 84.8 kg (187 lb)     Body mass index is 34.2  kg/m².    Physical Exam:    General Appearance:  Alert and cooperative, not in any acute distress.   Head:  Atraumatic and normocephalic, without obvious abnormality other than minimal asymmetry of the face especially in the left side but no significant deviation of the angle of the mouth noted.   Eyes:          PERRLA, conjunctivae and sclerae normal, no Icterus. No pallor. Extraocular movements are within normal limits.   Ears:  Ears appear intact with no abnormalities noted.   Throat: No oral lesions, no thrush, oral mucosa moist.   Neck: Supple, trachea midline, no thyromegaly, no carotid bruit.   Back:   No kyphoscoliosis present. No tenderness to palpation,   range of motion normal.   Lungs:   Chest shape is normal. Breath sounds heard bilaterally equally.  No crackles or wheezing. No Pleural rub or bronchial breathing.   Heart:  Normal S1 and S2, no murmur, no gallop, no rub. No JVD.   Abdomen:   Normal bowel sounds, no masses, no organomegaly. Soft, nontender, nondistended, no guarding, no rebound tenderness.   Extremities: Moves all extremities, no edema, no cyanosis, no clubbing.  Varicose veins noted in the lower extremities especially on the left side.   Pulses: Pulses palpable and equal bilaterally.   Skin: No bleeding, bruising or rash.   Neurologic: Alert and oriented x 3. Moves all four limbs equally. No tremors. No facial asymmetry.  Hand  is strong bilaterally.     Laboratory data:    I have reviewed the labs done in the emergency room.    Results from last 7 days   Lab Units 09/15/20  1808   SODIUM mmol/L 141   POTASSIUM mmol/L 5.0   CHLORIDE mmol/L 101   CO2 mmol/L 23.6   BUN mg/dL 39*   CREATININE mg/dL 1.48*   CALCIUM mg/dL 9.0   BILIRUBIN mg/dL 0.3   ALK PHOS U/L 66   ALT (SGPT) U/L 10   AST (SGOT) U/L 16   GLUCOSE mg/dL 108*     Results from last 7 days   Lab Units 09/15/20  1808   WBC 10*3/mm3 7.59   HEMOGLOBIN g/dL 10.6*   HEMATOCRIT % 32.9*   PLATELETS 10*3/mm3 238     Results from  last 7 days   Lab Units 09/15/20  1853   INR  1.05     Results from last 7 days   Lab Units 09/15/20  1853   TROPONIN T ng/mL <0.010     EKG:      EKG done in the emergency room was reviewed by me.  It shows sinus rhythm at 75 bpm.  Normal axis.  First-degree AV block noted.  No significant ST-T changes were noted.    Radiology:    Imaging Results (Last 72 Hours)     Procedure Component Value Units Date/Time    CT Angiogram Head [[id number]] Collected: 09/15/20 2119     Updated: 09/15/20 2120    Narrative:      FINAL REPORT    TECHNIQUE:  Thin section axial CT with contrast and multiplanar  reconstruction.    CLINICAL HISTORY:  facial droop, stroke sx    FINDINGS:  CTA HEAD/BRAIN:  The noncontrast CT of the head is unremarkable.  The intracranial portions of the internal carotid arteries are  unremarkable, except for vascular calcification. The anterior  and middle cerebral arteries are unremarkable.  The intracranial  left vertebral artery is dominant.  The vertebral and basilar  arteries are otherwise normal.  The right posterior cerebral  artery is mainly supplied from the anterior, a normal variant.      Impression:      CTA HEAD/BRAIN: No significant arterial abnormality.    Authenticated by Jean Carlos Martin M.D. on 09/15/2020 09:19:25 PM    CT Angiogram Neck [[id number]] Collected: 09/15/20 2119     Updated: 09/15/20 2120    Narrative:      FINAL REPORT    TECHNIQUE:  Axial images through the neck were obtained following IV  contrast administration.    CLINICAL HISTORY:  facial droop, stroke sx    FINDINGS:  Nonvascular: The nasopharynx, pharynx, and larynx are normal.  There is no mass or adenopathy. The visualized sinuses are  clear. There is no osseous abnormality.  Vascular: There is  atherosclerotic calcification of the abdominal aorta, but no  significant stenosis involving the origin of the great vessels.  Right carotid: There is atherosclerotic calcification at the  carotid bulb, but no significant ICA or CCA  stenosis.  Left  carotid: There is less than 50% stenosis involving the proximal  ICA.  No other ICA or CCA stenosis is seen.  Vertebrals: The  left vertebral artery is dominant, both vertebral arteries are  patent with no stenosis.      Impression:      Mild proximal left ICA stenosis otherwise no significant  arterial abnormality.    Authenticated by Jean Carlos Martin M.D. on 09/15/2020 09:19:25 PM    CT Head Without Contrast [[id number]] Collected: 09/15/20 1942     Updated: 09/15/20 1943    Narrative:      FINAL REPORT    TECHNIQUE:  Routine axial images through the head were obtained without  contrast.    CLINICAL HISTORY:  facial swelling, numbness on left    FINDINGS:  The ventricles are mildly dilated, proportionate to the degree  of generalized mild atrophy.  Mild periventricular and deep  white matter low-attenuation areas are consistent with chronic  small vessel ischemia.  There is no mass or shift in midline  structures.  There is no intracranial hemorrhage.  There is no  acute sinus or osseous abnormality.      Impression:      No acute intracranial abnormality.    Authenticated by Jean Carlos Martin M.D. on 09/15/2020 07:42:07 PM    XR Chest 2 View [[id number]] Resulted: 09/15/20 1858     Updated: 09/15/20 1858        Chest x-ray with PA and lateral views done in the emergency room was reviewed by me.  It shows no infiltrates or significant cardiomegaly.    Assessment:    1.  Hypertensive urgency with suspected hypertensive encephalopathy, POA.  2.  Suspected transient ischemic attack, secondary to #1, present on admission.  2.  Dyslipidemia.  3.  Essential hypertension.  4.  Chronic kidney disease stage III.  5.  Anemia of chronic kidney disease.    Plan:    Ms. Elise is currently being admitted to the medical floor with telemetry for observation.  She does seem to have hypertensive urgency with symptoms of TIA.  Her blood pressure has improved with IV hydralazine.  We will continue her on her home medications  including lisinopril and Aggrenox.  She does seem to have systolic hypertension and I will start her on amlodipine at night.  Patient also does seem to have chronic kidney disease with associated anemia.  Her chronic kidney disease may be related to hypertensive nephropathy.    We will consult physical and Occupational Therapy.  We will also get speech therapy to evaluate swallow function.  We will keep her on telemetry to rule out atrial fibrillation.  We will get 2D echocardiogram in the morning.  I have discussed the patient's advanced directives with her.  She states that she has a living will and wants to be full code.    Advance Care Planning      ACP discussion was held with the patient during this visit. Patient has an advance directive (not in EMR), copy requested. Patient does not have an advance directive, information provided.      Brandan Cherry MD  09/15/20  21:41 EDT    Dictated utilizing Dragon dictation.

## 2020-09-16 NOTE — PLAN OF CARE
Problem: Adult Inpatient Plan of Care  Goal: Absence of Hospital-Acquired Illness or Injury  Intervention: Identify and Manage Fall Risk  Recent Flowsheet Documentation  Taken 9/15/2020 2200 by Maggi Junior RN  Safety Promotion/Fall Prevention:   activity supervised   assistive device/personal items within reach   toileting scheduled   safety round/check completed   nonskid shoes/slippers when out of bed  Intervention: Prevent Skin Injury  Recent Flowsheet Documentation  Taken 9/15/2020 2200 by Maggi Junior RN  Body Position: position changed independently  Intervention: Prevent and Manage VTE (venous thromboembolism) Risk  Recent Flowsheet Documentation  Taken 9/15/2020 2200 by Maggi Junior RN  VTE Prevention/Management:   bilateral   dorsiflexion/plantar flexion performed  Goal: Optimal Comfort and Wellbeing  Intervention: Provide Person-Centered Care  Recent Flowsheet Documentation  Taken 9/15/2020 2200 by Maggi Junior RN  Trust Relationship/Rapport:   care explained   thoughts/feelings acknowledged   Goal Outcome Evaluation:

## 2020-09-16 NOTE — THERAPY DISCHARGE NOTE
Acute Care - Occupational Therapy Discharge Summary  [facility]     Patient Name: Tanya Elise  : 1933  MRN: [id number]    Today's Date: 2020  Onset of Illness/Injury or Date of Surgery: 09/15/20    Date of Referral to OT: 09/15/20  Referring Physician: Dr. Cherry      Admit Date: 9/15/2020        OT Recommendation and Plan    Visit Dx:    ICD-10-CM ICD-9-CM   1. TIA (transient ischemic attack)  G45.9 435.9   2. Impaired mobility and ADLs  Z74.09 V49.89    Z78.9          Time Calculation- OT     Row Name 20 1109             Time Calculation- OT    OT Start Time  920  -SD      OT Received On  20  -SD        User Key  (r) = Recorded By, (t) = Taken By, (c) = Cosigned By    Initials Name Provider Type    Tamera Reyna OT Occupational Therapist                Outcome Measures     Row Name 20 0920             How much help from another is currently needed...    Putting on and taking off regular lower body clothing?  4  -SD      Bathing (including washing, rinsing, and drying)  3  -SD      Toileting (which includes using toilet bed pan or urinal)  4  -SD      Putting on and taking off regular upper body clothing  4  -SD      Taking care of personal grooming (such as brushing teeth)  4  -SD      Eating meals  4  -SD      AM-PAC 6 Clicks Score (OT)  23  -SD         Functional Assessment    Outcome Measure Options  AM-PAC 6 Clicks Daily Activity (OT)  -SD        User Key  (r) = Recorded By, (t) = Taken By, (c) = Cosigned By    Initials Name Provider Type    Tamera Reyna OT Occupational Therapist              Therapy Charges for Today     Code Description Service Date Service Provider Modifiers Qty    [id number]  OT EVAL LOW COMPLEXITY 3 2020 Tamera Ashley OT GO 1                 Tamera Ashley OT  2020

## 2020-09-16 NOTE — PLAN OF CARE
Goal Outcome Evaluation:  Plan of Care Reviewed With: patient  Progress: no change  Outcome Summary: New Admit. Resting in bed. No c/o pain/discomfort. Improvement in s/s

## 2020-09-16 NOTE — THERAPY EVALUATION
Acute Care - Speech Language Pathology   Swallow Initial Evaluation  [facility]     Patient Name: Tanya Elise  : 1933  MRN: [id number]  Today's Date: 2020               Admit Date: 9/15/2020    Visit Dx:     ICD-10-CM ICD-9-CM   1. TIA (transient ischemic attack)  G45.9 435.9     Patient Active Problem List   Diagnosis   • Pure hypercholesterolemia   • Essential hypertension   • Temporary cerebral vascular dysfunction   • Esophageal reflux   • BMI 35.0-35.9,adult   • Morbidly obese (CMS/HCC)   • Diarrhea   • TIA (transient ischemic attack)   • Hypertensive urgency   • Chronic kidney disease, stage III (moderate) (CMS/HCC)     Past Medical History:   Diagnosis Date   • Acid reflux    • Allergic    • Cataract    • Cerumen impaction    • Dizziness    • High cholesterol    • Hypertension    • Influenza    • Reflux gastritis    • Seasonal allergies    • Spinal headache    • Stroke (CMS/HCC)    • Transient ischemic attack    • Wears glasses      Past Surgical History:   Procedure Laterality Date   • APPENDECTOMY     • BREAST BIOPSY Left    • CATARACT EXTRACTION Bilateral    •  SECTION      X2   • COLONOSCOPY     • DILATATION AND CURETTAGE     • EYE CAPSULOTOMY WITH LASER Right 3/20/2017    Procedure: RIGHT EYE CAPSULOTOMY WITH LASER;  Surgeon: Ev Cortez MD;  Location: [facility];  Service:    • HYSTERECTOMY     • NASAL SEPTUM SURGERY     • TOOTH EXTRACTION          SWALLOW EVALUATION (last 72 hours)      [facility] Adult Swallow Evaluation     Row Name 20 1023                   Rehab Evaluation    Document Type  evaluation  -TM        Subjective Information  no complaints  -TM        Patient Observations  alert;cooperative  -TM        Patient/Family/Caregiver Comments/Observations  no family present  -TM        Patient Effort  excellent  -TM        Symptoms Noted During/After Treatment  none  -TM           General Information    Patient Profile Reviewed  yes  -TM        Pertinent History Of  Current Problem  TIA, GERD, renal, DM, CVA  -TM        Current Method of Nutrition  regular textures;thin liquids  -TM        Precautions/Limitations, Vision  WFL with corrective lenses  -TM        Precautions/Limitations, Hearing  WFL  -TM        Prior Level of Function-Communication  WFL  -TM        Prior Level of Function-Swallowing  no diet consistency restrictions  -TM        Plans/Goals Discussed with  patient;other (see comments) RN  -TM        Barriers to Rehab  none identified  -TM        Patient's Goals for Discharge  patient did not state  -TM           Pain    Additional Documentation  Pain Scale: Numbers Pre/Post-Treatment (Group)  -TM           Pain Scale: Numbers Pre/Post-Treatment    Pretreatment Pain Rating  0/10 - no pain  -TM        Posttreatment Pain Rating  0/10 - no pain  -TM           Oral Motor and Function    Oral Lesions or Structural Abnormalities and/or variants  none identified  -TM        Dentition Assessment  natural, present and adequate  -TM        Secretion Management  WNL/WFL  -TM        Mucosal Quality  moist, healthy  -TM        Volitional Cough  WFL  -TM           Oral Musculature and Cranial Nerve Assessment    Oral Motor General Assessment  WFL  -TM           General Eating/Swallowing Observations    Respiratory Support Currently in Use  room air  -TM        Eating/Swallowing Skills  fed by SLP;self-fed  -TM        Positioning During Eating  upright in chair  -TM        Utensils Used  spoon;straw  -TM        Consistencies Trialed  regular textures;pureed;thin liquids  -TM           Respiratory    Respiratory Status  WFL;room air  -TM           Clinical Swallow Eval    Oral Prep Phase  WFL  -TM        Oral Transit  WFL  -TM        Oral Residue  WFL  -TM        Pharyngeal Phase  WFL  -TM        Esophageal Phase  suspected esophageal impairment prior dx of GERD  -TM        Clinical Swallow Evaluation Summary  Bedside eval of swallow completed.  Oral phase was WFL with all trials.   No overt s/s aspiration with any consistency.  RN reported no observed difficulty and pt. denied dysphagia.  Pt. does have prior dx of GERD.  Recommend:  1. continue reg diet with thin liq as dipti,  2. meds whole as dipti,  3. aspiration precautions,  4. reflux precautions.    -TM           Esophageal Phase Concerns    Esophageal Phase Concerns  other (see comments)  -TM        Esophageal Phase Concerns, Comment  prior dx of GERD  -TM           Clinical Impression    SLP Swallowing Diagnosis  functional oral phase;functional pharyngeal phase  -TM        Functional Impact  no impact on function  -TM        Swallow Criteria for Skilled Therapeutic Interventions Met  no problems identified which require skilled intervention  -TM           Recommendations    Therapy Frequency (Swallow)  evaluation only  -TM        SLP Diet Recommendation  regular textures;thin liquids  -TM        Recommended Precautions and Strategies  upright posture during/after eating;other (see comments) reflux precautions  -TM        SLP Rec. for Method of Medication Administration  meds whole;with thin liquids;as tolerated  -TM        Monitor for Signs of Aspiration  notify SLP if any concerns  -TM        Anticipated Dischage Disposition (SLP)  unknown  -TM          User Key  (r) = Recorded By, (t) = Taken By, (c) = Cosigned By    Initials Name Effective Dates     Pricilla Rabago 03/07/18 -           EDUCATION  The patient has been educated in the following areas:   Dysphagia (Swallowing Impairment).    SLP Recommendation and Plan  SLP Swallowing Diagnosis: functional oral phase, functional pharyngeal phase  SLP Diet Recommendation: regular textures, thin liquids  Recommended Precautions and Strategies: upright posture during/after eating, other (see comments)(reflux precautions)  SLP Rec. for Method of Medication Administration: meds whole, with thin liquids, as tolerated     Monitor for Signs of Aspiration: notify SLP if any concerns      Swallow Criteria for Skilled Therapeutic Interventions Met: no problems identified which require skilled intervention  Anticipated Dischage Disposition (SLP): unknown     Therapy Frequency (Swallow): evaluation only                            Plan of Care Reviewed With: patient  Progress: improving  Outcome Summary: Bedside eval of swallow completed.  Oral phase was WFL with all trials.  No overt s/s aspiration with any consistency.  RN reported no observed difficulty and pt. denied dysphagia.  Pt. does have prior dx of GERD.  Recommend:  1. continue reg diet with thin liq as dipti,  2. meds whole as dipti,  3. aspiration precautions,  4. reflux precautions.           Time Calculation:   Time Calculation- SLP     Row Name 09/16/20 1052             Time Calculation- SLP    SLP Start Time  1023  -TM      SLP Stop Time  1033  -TM      SLP Time Calculation (min)  10 min  -TM      SLP Received On  09/16/20  -        User Key  (r) = Recorded By, (t) = Taken By, (c) = Cosigned By    Initials Name Provider Type    TM Pricilla Rabago Speech and Language Pathologist          Therapy Charges for Today     Code Description Service Date Service Provider Modifiers Qty    [id number]  ST EVAL ORAL PHARYNG SWALLOW 4 9/16/2020 Pricilla Rabago GN 1               Pricilla Rabago  9/16/2020

## 2020-09-16 NOTE — PLAN OF CARE
Bedside eval of swallow completed.  Oral phase was WFL with all trials.  No overt s/s aspiration with any consistency.  RN reported no observed difficulty and pt. denied dysphagia.  Pt. does have prior dx of GERD.  Recommend:  1. continue reg diet with thin liq as dipti,  2. meds whole as dipti,  3. aspiration precautions,  4. reflux precautions.

## 2020-09-17 ENCOUNTER — TRANSITIONAL CARE MANAGEMENT TELEPHONE ENCOUNTER (OUTPATIENT)
Dept: CALL CENTER | Facility: HOSPITAL | Age: 85
End: 2020-09-17

## 2020-09-17 NOTE — OUTREACH NOTE
Call Center TCM Note      Responses   [facility] patient discharged from?  [facility]   Does the patient have one of the following disease processes/diagnoses(primary or secondary)?  Stroke (TIA)   TCM attempt successful?  Yes   Call start time  1426   Call end time  1430   Discharge diagnosis  Hypertensive urgency with suspected hypertensive encephalopathy,     Acute and now resolved  transient ischemic attack   Meds reviewed with patient/caregiver?  Yes   Is the patient having any side effects they believe may be caused by any medication additions or changes?  No   Does the patient have all medications ordered at discharge?  Yes   Is the patient taking all medications as directed (includes completed medication regime)?  Yes   Does the patient have a primary care provider?   Yes   Does the patient have an appointment with their PCP within 7 days of discharge?  Yes   Comments regarding PCP  Has a PCP hospital followup appt on 9/22/2020 with Dr Jackson   Has the patient kept scheduled appointments due by today?  N/A   Has home health visited the patient within 72 hours of discharge?  N/A   Does the patient require any assistance with activities of daily living such as eating, bathing, dressing, walking, etc.?  Yes   Does the patient have any residual symptoms from stroke/TIA?  No   Does the patient understand the diet ordered at discharge?  Yes   Did the patient receive a copy of their discharge instructions?  Yes   Nursing interventions  Reviewed instructions with patient   What is the patient's perception of their health status since discharge?  Improving   Nursing interventions  Nurse provided patient education   Is the patient able to teach back FAST for Stroke?  Yes   Is the patient/caregiver able to teach back the risk factors for a stroke?  High Cholesterol, High blood pressure-goal below 120/80, History of TIAs   Is the patient/caregiver able to teach back signs and symptoms related to disease process for  when to call PCP?  Yes   Is the patient/caregiver able to teach back signs and symptoms related to disease process for when to call 911?  Yes   Is the patient/caregiver able to teach back the hierarchy of who to call/visit for symptoms/problems? PCP, Specialist, Home health nurse, Urgent Care, ED, 911  Yes   TCM call completed?  Yes          Herbert Mooney RN    9/17/2020, 14:30 EDT

## 2020-09-22 ENCOUNTER — OFFICE VISIT (OUTPATIENT)
Dept: INTERNAL MEDICINE | Facility: CLINIC | Age: 85
End: 2020-09-22

## 2020-09-22 VITALS
HEIGHT: 62 IN | TEMPERATURE: 97.8 F | SYSTOLIC BLOOD PRESSURE: 132 MMHG | OXYGEN SATURATION: 95 % | DIASTOLIC BLOOD PRESSURE: 64 MMHG | WEIGHT: 195 LBS | BODY MASS INDEX: 35.88 KG/M2 | HEART RATE: 78 BPM

## 2020-09-22 DIAGNOSIS — I16.0 HYPERTENSIVE URGENCY: ICD-10-CM

## 2020-09-22 DIAGNOSIS — E78.00 PURE HYPERCHOLESTEROLEMIA: ICD-10-CM

## 2020-09-22 DIAGNOSIS — G45.9 TIA (TRANSIENT ISCHEMIC ATTACK): Primary | ICD-10-CM

## 2020-09-22 PROCEDURE — 99214 OFFICE O/P EST MOD 30 MIN: CPT | Performed by: PHYSICIAN ASSISTANT

## 2020-09-22 RX ORDER — PRAVASTATIN SODIUM 40 MG
40 TABLET ORAL NIGHTLY
Qty: 90 TABLET | Refills: 3 | Status: SHIPPED | OUTPATIENT
Start: 2020-09-22 | End: 2021-09-10

## 2020-09-22 NOTE — PROGRESS NOTES
"Transitional Care Follow Up Visit  Subjective     Tanya Codi Elise is a 87 y.o. female who presents for a transitional care management visit.    Within 48 business hours after discharge our office contacted her via telephone to coordinate her care and needs.      I reviewed and discussed the details of that call along with the discharge summary, hospital problems, inpatient lab results, inpatient diagnostic studies, and consultation reports with Tanya.     Current outpatient and discharge medications have been reconciled for the patient.  Reviewed by: MARY Dueñas      Date of TCM Phone Call 9/16/2020   [facility]   Date of Admission 9/15/2020   Date of Discharge 9/16/2020   Discharge Disposition Home or Self Care     Risk for Readmission (LACE) Score: 4 (9/16/2020  6:00 AM)      History of Present Illness   Course During Hospital Stay: She reports that she had a plan 30 minutes of feeling that the left side of her face was puffy.  She took 2 tylenol and 2 baby aspirin.  She went to her son who noticed the left side of her face was puffy but also drooping so he took her immediately to the ED. she admits that forgot her evening dose of Aggrenox the night prior to this episode.       The following is taken directly from the hospital discharge summary:  \"Discharge Diagnosis:   1.  Hypertensive urgency with suspected hypertensive encephalopathy, POA.  2.  Acute and now resolved transient ischemic attack, secondary to #1, present on admission.  2.  Dyslipidemia.  3.  Essential hypertension.  4.  Chronic kidney disease stage III.  5.  Anemia of chronic kidney disease.     History of Present Illness/Hospital Course:  The patient is an 87-year-old lady with history of TIA, dyslipidemia, hypertension, GERD.  She presented to the emergency room for facial droop that occurred earlier in the evening and she was admitted last night to the hospitalist service.  She admitted to accidentally " "forgetting to take her Aggrenox.  She had no symptoms of syncope or dizziness or lightheadedness.  There is no acute injury known.  Her blood pressure was elevated at 202/81.  Blood work showed her creatinine was near baseline.  Her CT scan of the head showed no acute abnormality.   telemedicine neurology reviewed the case and the patient did not have an acute stroke.  A CT scan angiogram of the head and neck and brain were recommended with no significant stenosis.  Chest x-ray shows no acute process.  The hospitalist service followed her overnight with telemetry.  No acute telemetry events were noted.     The patient was fairly feisty during her hospital stay, demanding to be discharged home today.  She did see physical and Occupational Therapy with no acute deficits seen.  She had a 2D echo reviewed with no acute cardiac issues. See report listed below.  Her blood pressure was uncontrolled so she was started on Norvasc and she was restarted on her Aggrenox.  We discussed that forgetting her medications could cause recurrent TIA and even possibly stroke.  She reported understanding.  She stated that her son would help take care of her at home.  The patient is medically improved and stable for discharge home.  She agreed to follow-up with primary care physician in less than 1 week.\"     Following hospital discharge:  Today she reports that she is taking amlodipine 5 mg daily as directed upon discharge in addition to previously prescribed lisinopril 20 mg daily for hypertension control.  Her discharge medication list advised her to continue taking Lipitor nightly, however, she reports that she has not been taking Lipitor as it makes her feet hurt and swell.  Lipitor 20 mg appears to have been prescribed 9/18/2019 after labs showed hyperlipidemia, although she does not think she ever took it has she has had previous reactions to Lipitor and was not willing to try it again.  The hospitalist must not have known that she " "was not taking Lipitor as they told her to continue it.   She denies any chest pain, dyspnea, orthopnea, palpitations, lower extremity edema, weakness, speech disturbance, hemiparesis, visual disturbances or confusion.  She reports that her headaches are chronic and unchanged.  She denies any further facial drooping or \"puffy\" appearance since hospital discharge.        The following portions of the patient's history were reviewed and updated as appropriate: allergies, current medications, past family history, past medical history, past social history, past surgical history and problem list.    Review of Systems   Constitutional: Negative for appetite change, chills, fatigue, fever and unexpected weight change.   HENT: Positive for facial swelling (resolved). Negative for congestion, ear pain, hearing loss, nosebleeds, postnasal drip, rhinorrhea, sore throat, tinnitus and trouble swallowing.    Eyes: Negative for photophobia, discharge and visual disturbance.   Respiratory: Negative for cough, chest tightness, shortness of breath and wheezing.    Cardiovascular: Negative for chest pain, palpitations and leg swelling.   Gastrointestinal: Negative for abdominal distention, abdominal pain, blood in stool, constipation, diarrhea, nausea and vomiting.   Endocrine: Negative for cold intolerance, heat intolerance, polydipsia, polyphagia and polyuria.   Genitourinary: Negative.    Musculoskeletal: Positive for back pain (chronic) and myalgias. Negative for arthralgias, joint swelling, neck pain and neck stiffness.   Skin: Negative for color change, pallor, rash and wound.   Allergic/Immunologic: Negative for environmental allergies, food allergies and immunocompromised state.   Neurological: Positive for facial asymmetry (resolved) and headaches (chronic, unchanged). Negative for dizziness, tremors, seizures, syncope, speech difficulty, weakness, light-headedness and numbness.   Hematological: Negative for adenopathy. Does " not bruise/bleed easily.   Psychiatric/Behavioral: Negative for agitation, behavioral problems, confusion, dysphoric mood, hallucinations, self-injury and suicidal ideas. The patient is not nervous/anxious.        Objective   Physical Exam  Vitals signs and nursing note reviewed.   Constitutional:       General: She is not in acute distress.     Appearance: Normal appearance. She is well-developed. She is not ill-appearing, toxic-appearing or diaphoretic.   HENT:      Head: Normocephalic and atraumatic.      Right Ear: Tympanic membrane normal.      Left Ear: Tympanic membrane normal.      Nose: No congestion or rhinorrhea.   Eyes:      General: No visual field deficit or scleral icterus.     Conjunctiva/sclera: Conjunctivae normal.      Pupils: Pupils are equal, round, and reactive to light.   Neck:      Musculoskeletal: Normal range of motion and neck supple. No neck rigidity.      Vascular: No carotid bruit.   Cardiovascular:      Rate and Rhythm: Normal rate and regular rhythm.      Heart sounds: Normal heart sounds. No murmur. No friction rub. No gallop.    Pulmonary:      Effort: Pulmonary effort is normal. No respiratory distress.      Breath sounds: Normal breath sounds. No wheezing, rhonchi or rales.   Chest:      Chest wall: No tenderness.   Abdominal:      General: Bowel sounds are normal. There is no distension.      Palpations: Abdomen is soft.      Tenderness: There is no abdominal tenderness. There is no right CVA tenderness, left CVA tenderness, guarding or rebound.      Hernia: No hernia is present.   Musculoskeletal: Normal range of motion.         General: No tenderness or deformity.      Right lower leg: No edema.      Left lower leg: No edema.   Lymphadenopathy:      Cervical: No cervical adenopathy.   Skin:     General: Skin is warm and dry.      Capillary Refill: Capillary refill takes less than 2 seconds.      Findings: No rash.   Neurological:      General: No focal deficit present.       Mental Status: She is alert and oriented to person, place, and time.      Cranial Nerves: Cranial nerves are intact. No cranial nerve deficit, dysarthria or facial asymmetry.      Sensory: Sensation is intact. No sensory deficit.      Motor: Motor function is intact. No weakness, tremor, atrophy, abnormal muscle tone, seizure activity or pronator drift.      Coordination: Coordination is intact. Romberg sign negative. Coordination normal. Finger-Nose-Finger Test and Heel to Shin Test normal. Rapid alternating movements normal.      Gait: Gait is intact. Gait and tandem walk normal.      Deep Tendon Reflexes: Reflexes are normal and symmetric. Reflexes normal.   Psychiatric:         Mood and Affect: Mood normal.         Behavior: Behavior normal.         Thought Content: Thought content normal.         Judgment: Judgment normal.         Assessment/Plan   Tanya was seen today for follow-up.    Diagnoses and all orders for this visit:    TIA (transient ischemic attack)  She was counseled regarding the need for 100% compliance with Aggrenox twice daily as well as for tight control of hypertension and hyperlipidemia.  Return to ER with any further signs or symptoms of stroke including facial drooping, speech disturbance, confusion, acute headache, weakness, hemiparesis or gait disturbance.    Pure hypercholesterolemia  -     Begin: Pravastatin (Pravachol) 40 MG tablet; Take 1 tablet by mouth Every Night. For cholesterol.    Hypertensive urgency  Hypertension now well controlled with addition of amlodipine 5 mg daily and continuation of lisinopril 20 mg daily.  Continue both medications.    Follow heart healthy/low salt diet.  Avoid processed foods. Monitor blood pressure as discussed.  Exercise as tolerated up to 30 minutes 5 days per week.          Hospital admission record reviewed.    Current outpatient and discharge medications have been reconciled for the patient.  Reviewed by: Reena Castaneda,  PA        Return in 3 months for follow-up or sooner if needed.  Recheck lipid panel at that time as well as CK due to history of statin induced myalgias.

## 2020-09-24 ENCOUNTER — READMISSION MANAGEMENT (OUTPATIENT)
Dept: CALL CENTER | Facility: HOSPITAL | Age: 85
End: 2020-09-24

## 2020-09-24 NOTE — OUTREACH NOTE
Stroke Week 2 Survey      Responses   [facility] patient discharged from?  [facility]   Does the patient have one of the following disease processes/diagnoses(primary or secondary)?  Stroke (TIA)   Week 2 attempt successful?  Yes   Call start time  1702   Call end time  1712   Discharge diagnosis  Hypertensive urgency with suspected hypertensive encephalopathy,     Acute and now resolved  transient ischemic attack   Meds reviewed with patient/caregiver?  Yes   Is the patient having any side effects they believe may be caused by any medication additions or changes?  No   Does the patient have all medications ordered at discharge?  Yes   Is the patient taking all medications as directed (includes completed medication regime)?  Yes   Does the patient have a primary care provider?   Yes   Does the patient have an appointment with their PCP within 7 days of discharge?  Yes   Has the patient kept scheduled appointments due by today?  Yes   Has home health visited the patient within 72 hours of discharge?  N/A   Does the patient require any assistance with activities of daily living such as eating, bathing, dressing, walking, etc.?  Yes   Does the patient have any residual symptoms from stroke/TIA?  Yes   Does the patient understand the diet ordered at discharge?  Yes   Did the patient receive a copy of their discharge instructions?  Yes   Nursing interventions  Reviewed instructions with patient   What is the patient's perception of their health status since discharge?  Improving   Nursing interventions  Nurse provided patient education   Is the patient able to teach back FAST for Stroke?  Yes   Is the patient/caregiver able to teach back the risk factors for a stroke?  High Cholesterol, High blood pressure-goal below 120/80, History of TIAs, Physical inactivity and obesity   Is the patient/caregiver able to teach back signs and symptoms related to disease process for when to call PCP?  Yes   Is the patient/caregiver  able to teach back signs and symptoms related to disease process for when to call 911?  Yes   Is the patient/caregiver able to teach back the hierarchy of who to call/visit for symptoms/problems? PCP, Specialist, Home health nurse, Urgent Care, ED, 911  Yes   Week 2 call completed?  Yes          Boone Murrieta RN

## 2020-09-30 ENCOUNTER — READMISSION MANAGEMENT (OUTPATIENT)
Dept: CALL CENTER | Facility: HOSPITAL | Age: 85
End: 2020-09-30

## 2020-09-30 NOTE — OUTREACH NOTE
Stroke Week 3 Survey      Responses   [facility] patient discharged from?  [facility]   Does the patient have one of the following disease processes/diagnoses(primary or secondary)?  Stroke (TIA)   Week 3 attempt successful?  Yes   Call start time  1556   Call end time  1558   Discharge diagnosis  Hypertensive urgency with suspected hypertensive encephalopathy,     Acute and now resolved  transient ischemic attack   Meds reviewed with patient/caregiver?  Yes   Is the patient having any side effects they believe may be caused by any medication additions or changes?  No   Does the patient have all medications ordered at discharge?  Yes   Is the patient taking all medications as directed (includes completed medication regime)?  Yes   Does the patient have a primary care provider?   Yes   Does the patient have an appointment with their PCP within 7 days of discharge?  Yes   Has the patient kept scheduled appointments due by today?  Yes   Psychosocial issues?  No   Does the patient require any assistance with activities of daily living such as eating, bathing, dressing, walking, etc.?  No   Does the patient have any residual symptoms from stroke/TIA?  No   Does the patient understand the diet ordered at discharge?  Yes   Did the patient receive a copy of their discharge instructions?  Yes   Nursing interventions  Reviewed instructions with patient   What is the patient's perception of their health status since discharge?  Improving   Nursing interventions  Nurse provided patient education   Is the patient able to teach back FAST for Stroke?  Yes   Is the patient/caregiver able to teach back the risk factors for a stroke?  High Cholesterol, High blood pressure-goal below 120/80, History of TIAs, Physical inactivity and obesity   Is the patient/caregiver able to teach back signs and symptoms related to disease process for when to call PCP?  Yes   Is the patient/caregiver able to teach back signs and symptoms related to  disease process for when to call 911?  Yes   Is the patient/caregiver able to teach back the hierarchy of who to call/visit for symptoms/problems? PCP, Specialist, Home health nurse, Urgent Care, ED, 911  Yes   Week 3 call completed?  Yes   Wrap up additional comments  pt doing well.          Juanita Brewer, RN

## 2020-10-13 ENCOUNTER — APPOINTMENT (OUTPATIENT)
Dept: GENERAL RADIOLOGY | Facility: HOSPITAL | Age: 85
End: 2020-10-13

## 2020-10-13 ENCOUNTER — HOSPITAL ENCOUNTER (INPATIENT)
Facility: HOSPITAL | Age: 85
LOS: 3 days | Discharge: HOME | End: 2020-10-16
Attending: EMERGENCY MEDICINE | Admitting: INTERNAL MEDICINE

## 2020-10-13 ENCOUNTER — READMISSION MANAGEMENT (OUTPATIENT)
Dept: CALL CENTER | Facility: HOSPITAL | Age: 85
End: 2020-10-13

## 2020-10-13 ENCOUNTER — EPISODE CHANGES (OUTPATIENT)
Dept: CASE MANAGEMENT | Facility: OTHER | Age: 85
End: 2020-10-13

## 2020-10-13 ENCOUNTER — APPOINTMENT (OUTPATIENT)
Dept: CT IMAGING | Facility: HOSPITAL | Age: 85
End: 2020-10-13

## 2020-10-13 DIAGNOSIS — I25.9 CHEST PAIN DUE TO MYOCARDIAL ISCHEMIA, UNSPECIFIED ISCHEMIC CHEST PAIN TYPE: ICD-10-CM

## 2020-10-13 DIAGNOSIS — I21.3 ST ELEVATION MYOCARDIAL INFARCTION (STEMI), UNSPECIFIED ARTERY (HCC): Primary | ICD-10-CM

## 2020-10-13 LAB
ALBUMIN SERPL-MCNC: 3.9 G/DL (ref 3.5–5.2)
ALBUMIN/GLOB SERPL: 1.2 G/DL
ALP SERPL-CCNC: 55 U/L (ref 39–117)
ALT SERPL W P-5'-P-CCNC: 12 U/L (ref 1–33)
ANION GAP SERPL CALCULATED.3IONS-SCNC: 10.3 MMOL/L (ref 5–15)
AST SERPL-CCNC: 25 U/L (ref 1–32)
BASOPHILS # BLD AUTO: 0.04 10*3/MM3 (ref 0–0.2)
BASOPHILS NFR BLD AUTO: 0.6 % (ref 0–1.5)
BILIRUB SERPL-MCNC: 0.3 MG/DL (ref 0–1.2)
BUN SERPL-MCNC: 30 MG/DL (ref 8–23)
BUN/CREAT SERPL: 20.7 (ref 7–25)
CALCIUM SPEC-SCNC: 8.9 MG/DL (ref 8.6–10.5)
CHLORIDE SERPL-SCNC: 108 MMOL/L (ref 98–107)
CO2 SERPL-SCNC: 19.7 MMOL/L (ref 22–29)
CREAT SERPL-MCNC: 1.45 MG/DL (ref 0.57–1)
DEPRECATED RDW RBC AUTO: 47.8 FL (ref 37–54)
EOSINOPHIL # BLD AUTO: 0.1 10*3/MM3 (ref 0–0.4)
EOSINOPHIL NFR BLD AUTO: 1.4 % (ref 0.3–6.2)
ERYTHROCYTE [DISTWIDTH] IN BLOOD BY AUTOMATED COUNT: 12.8 % (ref 12.3–15.4)
GFR SERPL CREATININE-BSD FRML MDRD: 34 ML/MIN/1.73
GLOBULIN UR ELPH-MCNC: 3.2 GM/DL
GLUCOSE SERPL-MCNC: 201 MG/DL (ref 65–99)
HCT VFR BLD AUTO: 30.2 % (ref 34–46.6)
HGB BLD-MCNC: 10 G/DL (ref 12–15.9)
HOLD SPECIMEN: NORMAL
IMM GRANULOCYTES # BLD AUTO: 0.02 10*3/MM3 (ref 0–0.05)
IMM GRANULOCYTES NFR BLD AUTO: 0.3 % (ref 0–0.5)
LIPASE SERPL-CCNC: 39 U/L (ref 13–60)
LYMPHOCYTES # BLD AUTO: 2.43 10*3/MM3 (ref 0.7–3.1)
LYMPHOCYTES NFR BLD AUTO: 34 % (ref 19.6–45.3)
MCH RBC QN AUTO: 33.8 PG (ref 26.6–33)
MCHC RBC AUTO-ENTMCNC: 33.1 G/DL (ref 31.5–35.7)
MCV RBC AUTO: 102 FL (ref 79–97)
MONOCYTES # BLD AUTO: 0.45 10*3/MM3 (ref 0.1–0.9)
MONOCYTES NFR BLD AUTO: 6.3 % (ref 5–12)
NEUTROPHILS NFR BLD AUTO: 4.1 10*3/MM3 (ref 1.7–7)
NEUTROPHILS NFR BLD AUTO: 57.4 % (ref 42.7–76)
NRBC BLD AUTO-RTO: 0 /100 WBC (ref 0–0.2)
PLATELET # BLD AUTO: 227 10*3/MM3 (ref 140–450)
PMV BLD AUTO: 10.9 FL (ref 6–12)
POTASSIUM SERPL-SCNC: 4.8 MMOL/L (ref 3.5–5.2)
PROT SERPL-MCNC: 7.1 G/DL (ref 6–8.5)
RBC # BLD AUTO: 2.96 10*6/MM3 (ref 3.77–5.28)
SODIUM SERPL-SCNC: 138 MMOL/L (ref 136–145)
TROPONIN T SERPL-MCNC: 0.03 NG/ML (ref 0–0.03)
TROPONIN T SERPL-MCNC: 0.07 NG/ML (ref 0–0.03)
WBC # BLD AUTO: 7.14 10*3/MM3 (ref 3.4–10.8)
WHOLE BLOOD HOLD SPECIMEN: NORMAL
WHOLE BLOOD HOLD SPECIMEN: NORMAL

## 2020-10-13 PROCEDURE — 99152 MOD SED SAME PHYS/QHP 5/>YRS: CPT | Performed by: INTERNAL MEDICINE

## 2020-10-13 PROCEDURE — C1887 CATHETER, GUIDING: HCPCS | Performed by: INTERNAL MEDICINE

## 2020-10-13 PROCEDURE — B2111ZZ FLUOROSCOPY OF MULTIPLE CORONARY ARTERIES USING LOW OSMOLAR CONTRAST: ICD-10-PCS | Performed by: INTERNAL MEDICINE

## 2020-10-13 PROCEDURE — C1894 INTRO/SHEATH, NON-LASER: HCPCS | Performed by: INTERNAL MEDICINE

## 2020-10-13 PROCEDURE — 93010 ELECTROCARDIOGRAM REPORT: CPT | Performed by: INTERNAL MEDICINE

## 2020-10-13 PROCEDURE — C1874 STENT, COATED/COV W/DEL SYS: HCPCS | Performed by: INTERNAL MEDICINE

## 2020-10-13 PROCEDURE — C1769 GUIDE WIRE: HCPCS | Performed by: INTERNAL MEDICINE

## 2020-10-13 PROCEDURE — 0 IOPAMIDOL PER 1 ML: Performed by: EMERGENCY MEDICINE

## 2020-10-13 PROCEDURE — 99284 EMERGENCY DEPT VISIT MOD MDM: CPT

## 2020-10-13 PROCEDURE — 93454 CORONARY ARTERY ANGIO S&I: CPT | Performed by: INTERNAL MEDICINE

## 2020-10-13 PROCEDURE — 80053 COMPREHEN METABOLIC PANEL: CPT

## 2020-10-13 PROCEDURE — 93005 ELECTROCARDIOGRAM TRACING: CPT

## 2020-10-13 PROCEDURE — 99223 1ST HOSP IP/OBS HIGH 75: CPT | Performed by: INTERNAL MEDICINE

## 2020-10-13 PROCEDURE — 25010000002 ONDANSETRON PER 1 MG: Performed by: PHYSICIAN ASSISTANT

## 2020-10-13 PROCEDURE — 84484 ASSAY OF TROPONIN QUANT: CPT

## 2020-10-13 PROCEDURE — 93005 ELECTROCARDIOGRAM TRACING: CPT | Performed by: INTERNAL MEDICINE

## 2020-10-13 PROCEDURE — C1725 CATH, TRANSLUMIN NON-LASER: HCPCS | Performed by: INTERNAL MEDICINE

## 2020-10-13 PROCEDURE — 85025 COMPLETE CBC W/AUTO DIFF WBC: CPT

## 2020-10-13 PROCEDURE — 25010000002 HEPARIN (PORCINE) PER 1000 UNITS: Performed by: INTERNAL MEDICINE

## 2020-10-13 PROCEDURE — 99153 MOD SED SAME PHYS/QHP EA: CPT | Performed by: INTERNAL MEDICINE

## 2020-10-13 PROCEDURE — 25010000002 MORPHINE SULFATE (PF) 2 MG/ML SOLUTION: Performed by: EMERGENCY MEDICINE

## 2020-10-13 PROCEDURE — 25010000003 LIDOCAINE 1 % SOLUTION: Performed by: INTERNAL MEDICINE

## 2020-10-13 PROCEDURE — 84484 ASSAY OF TROPONIN QUANT: CPT | Performed by: PHYSICIAN ASSISTANT

## 2020-10-13 PROCEDURE — 0 IOPAMIDOL PER 1 ML: Performed by: INTERNAL MEDICINE

## 2020-10-13 PROCEDURE — 027034Z DILATION OF CORONARY ARTERY, ONE ARTERY WITH DRUG-ELUTING INTRALUMINAL DEVICE, PERCUTANEOUS APPROACH: ICD-10-PCS | Performed by: INTERNAL MEDICINE

## 2020-10-13 PROCEDURE — C9606 PERC D-E COR REVASC W AMI S: HCPCS | Performed by: INTERNAL MEDICINE

## 2020-10-13 PROCEDURE — 4A023N7 MEASUREMENT OF CARDIAC SAMPLING AND PRESSURE, LEFT HEART, PERCUTANEOUS APPROACH: ICD-10-PCS | Performed by: INTERNAL MEDICINE

## 2020-10-13 PROCEDURE — 71045 X-RAY EXAM CHEST 1 VIEW: CPT

## 2020-10-13 PROCEDURE — 92941 PRQ TRLML REVSC TOT OCCL AMI: CPT | Performed by: INTERNAL MEDICINE

## 2020-10-13 PROCEDURE — 25010000002 MIDAZOLAM PER 1MG: Performed by: INTERNAL MEDICINE

## 2020-10-13 PROCEDURE — 93005 ELECTROCARDIOGRAM TRACING: CPT | Performed by: PHYSICIAN ASSISTANT

## 2020-10-13 PROCEDURE — 85347 COAGULATION TIME ACTIVATED: CPT

## 2020-10-13 PROCEDURE — 71275 CT ANGIOGRAPHY CHEST: CPT

## 2020-10-13 PROCEDURE — 83690 ASSAY OF LIPASE: CPT | Performed by: PHYSICIAN ASSISTANT

## 2020-10-13 DEVICE — STNT CORNRY RESOLUTE ONYX RX 2X30MM: Type: IMPLANTABLE DEVICE | Status: FUNCTIONAL

## 2020-10-13 RX ORDER — ONDANSETRON 2 MG/ML
4 INJECTION INTRAMUSCULAR; INTRAVENOUS ONCE
Status: COMPLETED | OUTPATIENT
Start: 2020-10-13 | End: 2020-10-13

## 2020-10-13 RX ORDER — ASPIRIN 325 MG
TABLET ORAL AS NEEDED
Status: DISCONTINUED | OUTPATIENT
Start: 2020-10-13 | End: 2020-10-13 | Stop reason: HOSPADM

## 2020-10-13 RX ORDER — ALPRAZOLAM 0.25 MG/1
0.25 TABLET ORAL 3 TIMES DAILY PRN
Status: DISCONTINUED | OUTPATIENT
Start: 2020-10-13 | End: 2020-10-16 | Stop reason: HOSPADM

## 2020-10-13 RX ORDER — NALOXONE HCL 0.4 MG/ML
0.4 VIAL (ML) INJECTION
Status: DISCONTINUED | OUTPATIENT
Start: 2020-10-13 | End: 2020-10-16 | Stop reason: HOSPADM

## 2020-10-13 RX ORDER — CLOPIDOGREL BISULFATE 75 MG/1
TABLET ORAL AS NEEDED
Status: DISCONTINUED | OUTPATIENT
Start: 2020-10-13 | End: 2020-10-13 | Stop reason: HOSPADM

## 2020-10-13 RX ORDER — ASPIRIN 325 MG
325 TABLET ORAL ONCE
Status: COMPLETED | OUTPATIENT
Start: 2020-10-13 | End: 2020-10-13

## 2020-10-13 RX ORDER — ASPIRIN AND DIPYRIDAMOLE 25; 200 MG/1; MG/1
1 CAPSULE, EXTENDED RELEASE ORAL 2 TIMES DAILY
Status: ON HOLD | COMMUNITY
End: 2020-10-14

## 2020-10-13 RX ORDER — MORPHINE SULFATE 2 MG/ML
2 INJECTION, SOLUTION INTRAMUSCULAR; INTRAVENOUS ONCE
Status: COMPLETED | OUTPATIENT
Start: 2020-10-13 | End: 2020-10-13

## 2020-10-13 RX ORDER — MORPHINE SULFATE 4 MG/ML
4 INJECTION, SOLUTION INTRAMUSCULAR; INTRAVENOUS
Status: DISCONTINUED | OUTPATIENT
Start: 2020-10-13 | End: 2020-10-16 | Stop reason: HOSPADM

## 2020-10-13 RX ORDER — DIPHENHYDRAMINE HYDROCHLORIDE 50 MG/ML
25 INJECTION INTRAMUSCULAR; INTRAVENOUS EVERY 6 HOURS PRN
Status: DISCONTINUED | OUTPATIENT
Start: 2020-10-13 | End: 2020-10-16 | Stop reason: HOSPADM

## 2020-10-13 RX ORDER — OMEPRAZOLE 20 MG/1
20 CAPSULE, DELAYED RELEASE ORAL DAILY
Status: ON HOLD | COMMUNITY
End: 2020-10-14

## 2020-10-13 RX ORDER — BACLOFEN 10 MG/1
5 TABLET ORAL 2 TIMES DAILY
Status: ON HOLD | COMMUNITY
End: 2020-10-14

## 2020-10-13 RX ORDER — PRAVASTATIN SODIUM 20 MG
40 TABLET ORAL NIGHTLY
Status: DISCONTINUED | OUTPATIENT
Start: 2020-10-13 | End: 2020-10-16 | Stop reason: HOSPADM

## 2020-10-13 RX ORDER — ACETAMINOPHEN 325 MG/1
650 TABLET ORAL EVERY 4 HOURS PRN
Status: DISCONTINUED | OUTPATIENT
Start: 2020-10-13 | End: 2020-10-16 | Stop reason: HOSPADM

## 2020-10-13 RX ORDER — AMLODIPINE BESYLATE 5 MG/1
5 TABLET ORAL NIGHTLY
Status: DISCONTINUED | OUTPATIENT
Start: 2020-10-13 | End: 2020-10-16 | Stop reason: HOSPADM

## 2020-10-13 RX ORDER — LIDOCAINE HYDROCHLORIDE 10 MG/ML
INJECTION, SOLUTION INFILTRATION; PERINEURAL AS NEEDED
Status: DISCONTINUED | OUTPATIENT
Start: 2020-10-13 | End: 2020-10-13 | Stop reason: HOSPADM

## 2020-10-13 RX ORDER — MIDAZOLAM HYDROCHLORIDE 2 MG/2ML
INJECTION, SOLUTION INTRAMUSCULAR; INTRAVENOUS AS NEEDED
Status: DISCONTINUED | OUTPATIENT
Start: 2020-10-13 | End: 2020-10-13 | Stop reason: HOSPADM

## 2020-10-13 RX ORDER — LISINOPRIL 20 MG/1
20 TABLET ORAL DAILY
Status: DISCONTINUED | OUTPATIENT
Start: 2020-10-14 | End: 2020-10-16 | Stop reason: HOSPADM

## 2020-10-13 RX ORDER — HYDROCODONE BITARTRATE AND ACETAMINOPHEN 5; 325 MG/1; MG/1
1 TABLET ORAL EVERY 4 HOURS PRN
Status: DISCONTINUED | OUTPATIENT
Start: 2020-10-13 | End: 2020-10-16 | Stop reason: HOSPADM

## 2020-10-13 RX ORDER — SODIUM CHLORIDE 9 MG/ML
100 INJECTION, SOLUTION INTRAVENOUS CONTINUOUS
Status: ACTIVE | OUTPATIENT
Start: 2020-10-13 | End: 2020-10-14

## 2020-10-13 RX ORDER — NITROGLYCERIN 0.4 MG/1
0.4 TABLET SUBLINGUAL
Status: DISCONTINUED | OUTPATIENT
Start: 2020-10-13 | End: 2020-10-16

## 2020-10-13 RX ORDER — TEMAZEPAM 15 MG/1
15 CAPSULE ORAL NIGHTLY PRN
Status: DISCONTINUED | OUTPATIENT
Start: 2020-10-13 | End: 2020-10-16 | Stop reason: HOSPADM

## 2020-10-13 RX ORDER — ONDANSETRON 4 MG/1
4 TABLET, FILM COATED ORAL EVERY 6 HOURS PRN
Status: DISCONTINUED | OUTPATIENT
Start: 2020-10-13 | End: 2020-10-16 | Stop reason: HOSPADM

## 2020-10-13 RX ORDER — HEPARIN SODIUM 1000 [USP'U]/ML
INJECTION, SOLUTION INTRAVENOUS; SUBCUTANEOUS AS NEEDED
Status: DISCONTINUED | OUTPATIENT
Start: 2020-10-13 | End: 2020-10-13 | Stop reason: HOSPADM

## 2020-10-13 RX ORDER — ASPIRIN 325 MG
325 TABLET, DELAYED RELEASE (ENTERIC COATED) ORAL DAILY
Status: DISCONTINUED | OUTPATIENT
Start: 2020-10-14 | End: 2020-10-16 | Stop reason: HOSPADM

## 2020-10-13 RX ORDER — ONDANSETRON 2 MG/ML
4 INJECTION INTRAMUSCULAR; INTRAVENOUS EVERY 6 HOURS PRN
Status: DISCONTINUED | OUTPATIENT
Start: 2020-10-13 | End: 2020-10-16 | Stop reason: HOSPADM

## 2020-10-13 RX ORDER — SODIUM CHLORIDE 0.9 % (FLUSH) 0.9 %
10 SYRINGE (ML) INJECTION AS NEEDED
Status: DISCONTINUED | OUTPATIENT
Start: 2020-10-13 | End: 2020-10-16 | Stop reason: HOSPADM

## 2020-10-13 RX ORDER — CLOPIDOGREL BISULFATE 75 MG/1
75 TABLET ORAL DAILY
Status: DISCONTINUED | OUTPATIENT
Start: 2020-10-14 | End: 2020-10-16 | Stop reason: HOSPADM

## 2020-10-13 RX ADMIN — ASPIRIN 325 MG ORAL TABLET 325 MG: 325 PILL ORAL at 15:04

## 2020-10-13 RX ADMIN — HYDROCODONE BITARTRATE AND ACETAMINOPHEN 1 TABLET: 5; 325 TABLET ORAL at 19:06

## 2020-10-13 RX ADMIN — IOPAMIDOL 100 ML: 510 INJECTION, SOLUTION INTRAVASCULAR at 14:30

## 2020-10-13 RX ADMIN — AMLODIPINE BESYLATE 5 MG: 5 TABLET ORAL at 20:50

## 2020-10-13 RX ADMIN — LIDOCAINE HYDROCHLORIDE: 20 SOLUTION ORAL; TOPICAL at 15:15

## 2020-10-13 RX ADMIN — ONDANSETRON 4 MG: 2 INJECTION INTRAMUSCULAR; INTRAVENOUS at 15:42

## 2020-10-13 RX ADMIN — PRAVASTATIN SODIUM 40 MG: 20 TABLET ORAL at 20:50

## 2020-10-13 RX ADMIN — LIDOCAINE HYDROCHLORIDE: 20 SOLUTION ORAL; TOPICAL at 15:12

## 2020-10-13 RX ADMIN — MORPHINE SULFATE 2 MG: 2 INJECTION, SOLUTION INTRAMUSCULAR; INTRAVENOUS at 15:04

## 2020-10-13 RX ADMIN — NITROGLYCERIN 0.4 MG: 0.4 TABLET SUBLINGUAL at 16:26

## 2020-10-13 RX ADMIN — SODIUM CHLORIDE 1000 ML: 9 INJECTION, SOLUTION INTRAVENOUS at 15:04

## 2020-10-13 RX ADMIN — SODIUM CHLORIDE 100 ML/HR: 9 INJECTION, SOLUTION INTRAVENOUS at 18:22

## 2020-10-13 NOTE — ED PROVIDER NOTES
"Subjective   Patient is an 87-year-old female with a history of acid reflux, dizziness, hypertension, impaired functional mobility, reflux gastritis, TIA and stroke presenting to the ER for evaluation of chest pain.  Patient states this past Rubio 10/11/2020 she been having pain in her mid chest that began to radiate to her upper shoulders and back.  She states that she took 2 aspirin and 2 Tylenol and got some relief but it has persisted.  She states it feels like a \"tight band\" that squeezing on her chest.  She states it feels like a heaviness that is \"pushing in\" on her chest.  Denies any aggravating factors.  She denies any headache, dizziness, shortness of breath, productive cough, abdominal pain, nausea, emesis, diarrhea, or any other symptoms.  She denies any known cardiac history.  She is currently on anticoagulation for previous stroke, does not have a cardiologist.  Denies any recent known sick contacts.  Denies any fall or trauma.          Review of Systems   Constitutional: Negative for chills and fever.   HENT: Negative.    Eyes: Negative.    Respiratory: Negative for cough and shortness of breath.    Cardiovascular: Positive for chest pain.   Gastrointestinal: Negative.    Genitourinary: Negative.    Musculoskeletal: Negative.    Skin: Negative.    Allergic/Immunologic: Negative for immunocompromised state.   Neurological: Negative.    Psychiatric/Behavioral: Negative.        Past Medical History:   Diagnosis Date   • Acid reflux    • Allergic    • Cataract    • Cerumen impaction    • Dizziness    • High cholesterol    • Hypertension    • Impaired functional mobility, balance, gait, and endurance    • Influenza    • Reflux gastritis    • Seasonal allergies    • Spinal headache    • Stroke (CMS/[facility])    • Transient ischemic attack    • Wears glasses        Allergies   Allergen Reactions   • Crestor [Rosuvastatin Calcium] Headache   • Lipitor [Atorvastatin Calcium] Myalgia   • Penicillins Rash       Past " "Surgical History:   Procedure Laterality Date   • APPENDECTOMY     • BREAST BIOPSY Left    • CATARACT EXTRACTION Bilateral    •  SECTION      X2   • COLONOSCOPY     • DILATATION AND CURETTAGE     • EYE CAPSULOTOMY WITH LASER Right 3/20/2017    Procedure: RIGHT EYE CAPSULOTOMY WITH LASER;  Surgeon: Ev Cortez MD;  Location: [facility];  Service:    • HYSTERECTOMY     • NASAL SEPTUM SURGERY     • TOOTH EXTRACTION         Family History   Problem Relation Age of Onset   • Diabetes Son        Social History     Socioeconomic History   • Marital status:      Spouse name: Not on file   • Number of children: Not on file   • Years of education: Not on file   • Highest education level: Not on file   Tobacco Use   • Smoking status: Never Smoker   • Smokeless tobacco: Never Used   Substance and Sexual Activity   • Alcohol use: No   • Drug use: No   • Sexual activity: Defer           Objective   Physical Exam  Vitals signs and nursing note reviewed.     /53   Pulse 81   Temp 97.8 °F (36.6 °C) (Oral)   Resp 20   Ht 157.5 cm (62\")   Wt 86.2 kg (190 lb)   SpO2 93%   BMI 34.75 kg/m²     GEN: No acute distress, sitting upright in stretcher.  Awake and alert.  Does not appear toxic.  Does appear mildly uncomfortable.  Head: Normocephalic, atraumatic  Eyes: EOM intact  ENT: Mask in place per protocol  Chest: Patient has tenderness over the left chest with palpation, no obvious deformity  Cardiovascular: Regular rate and rhythm  Lungs: Clear to auscultation bilaterally without adventitious sounds  Abdomen: Soft, nontender, nondistended, no peritoneal signs or guarding  Extremities: Full range of motion, no pitting edema.  Right dorsalis pedis pulses 2+, left dorsalis pedis pulses 1+.  Neuro: GCS 15  Psych: Mood and affect are appropriate    Procedures           ED Course  ED Course as of Oct 13 1723   Tue Oct 13, 2020   1323 WBC: 7.14 [LA]   1323 Hemoglobin(!): 10.0 [LA]   1330 Narrative & " Impression    PROCEDURE: XR CHEST 1 VW-     HISTORY: Chest Pain Triage Protocol     COMPARISON: 09/15/2020.     FINDINGS: The heart is normal in size. The mediastinum is unremarkable.  The lungs are clear. There is no pneumothorax.  There are no acute  osseous abnormalities.     IMPRESSION:  No acute cardiopulmonary process.     Continued followup is recommended.     This report was finalized on 10/13/2020 1:26 PM by Yomi Salguero M.D..        [LA]   1354 Patient GFR is 34, CT tech called to notified me, discussed with Dr. uDpont, we will go ahead with the CTA with contrast and hydrate the patient with fluids    [LA]   1355 Creatinine seems to be similar to her previous levels    [LA]   1459 Narrative & Impression    PROCEDURE: CT ANGIOGRAM CHEST-     HISTORY: Chest pain radiating to back and shoulders     COMPARISON:  None .     TECHNIQUE: Axial images were obtained from the thoracic inlet through  the upper abdomen following the administration of Isovue 300. The CTA  protocol. Coronal and sagittal 3-D MIP images were reformatted.     FINDINGS: The thoracic aorta is normal in caliber with no evidence of  aneurysmal dilatation or dissection. The pulmonary arteries are  unremarkable. The heart is normal in size. There are no pleural or  pericardial effusions.There is no mediastinal adenopathy per size  criteria. Lung windows reveal no focal opacities or suspicious pulmonary  nodules. Within the visualized upper abdomen stones are present within  the gallbladder. The upper abdomen is otherwise unremarkable. Bone  windows reveal no lytic or destructive lesions.     IMPRESSION:  1. No evidence of a thoracic aortic aneurysm or dissection.  2. Gallstones.     This report was finalized on 10/13/2020 2:43 PM by Yomi Salguero M.D..        [LA]   1520 EKG interpreted by me reveals sinus rhythm rate of 64.  Nonspecific T wave changes.    [PF]   1521 RN states that patient vomited her GI cocktail up immediately  after ingestion, will give Zofran    [LA]   1544 I discussed findings the patient thus far, she is sitting comfortably in the stretcher, vital signs are stable.  She states she still has pain diffusely all over her chest    [LA]   1601 Discussed case with Dr. Dupont.    [LA]   1622 Preliminary troponin is 0.073 per lab.    [LA]   1622 Patient states she still having some discomfort in her chest, will order nitroglycerin, will contact cardiology.  Did obtain a repeat EKG that did show changes from previous    [LA]   1640 Consult with Dr. Franco, he reviewed the EKGs and wants to bring patient over to the Cath Lab    [LA]   1648 Follow-up EKG interpreted by me reveals sinus rhythm at a rate of 65, there is ST elevation in lead aVL and V1.  There is inferior and lateral ST depression.  Given elevated troponin and EKG changes, patient was discussed with Dr. franco by PA.  Will activate Cath Lab.    [PF]      ED Course User Index  [LA] Reyna Degroot PA-C  [PF] Mg Dupont, DO                                           MDM  Number of Diagnoses or Management Options  Chest pain due to myocardial ischemia, unspecified ischemic chest pain type:   ST elevation myocardial infarction (STEMI), unspecified artery (CMS/HCC):   Diagnosis management comments: On arrival, patient is stable, normotensive, no tachycardia or respiratory distress.  Differential includes ACS, cardiac dysrhythmia, aortic dissection, GERD, costochondritis, pneumothorax, bronchitis, and other concerns.  Will obtain basic labs including troponin, lipase, EKG, chest x-ray.  We will also obtain a CT of the chest.  Patient has had aspirin on arrival, heart rate 69, blood pressure is 125/51, did not want to give nitro at this point, discussed briefly with Dr. Dupont, will give morphine to help with pain.    Initial EKG was interpreted by Dr. Dupont.  Chest x-ray is read by the radiologist unremarkable.  CT of the chest was performed to rule out  dissection and only showed gallstones, no sign of dissection.  Patient did have a creatinine of 1.45 which was stable compared to her previous, was given fluids since she got the IV contrast.  I gave her morphine, GI cocktail and Zofran as well as aspirin to help.  Initial troponin was 0.029, specimen was hemolyzed.  She states she was still having some discomfort in her chest.  We got a repeat troponin which was elevated at 0.073, repeat EKG had concerning changes for an inferior MI.  I did order nitro and consulted with Dr. Franco who activated the Cath Lab and took patient for procedure.       Amount and/or Complexity of Data Reviewed  Clinical lab tests: reviewed and ordered  Tests in the radiology section of CPT®: reviewed and ordered  Discussion of test results with the performing providers: yes  Decide to obtain previous medical records or to obtain history from someone other than the patient: yes  Obtain history from someone other than the patient: yes  Review and summarize past medical records: yes  Discuss the patient with other providers: yes  Independent visualization of images, tracings, or specimens: yes    Risk of Complications, Morbidity, and/or Mortality  Presenting problems: moderate  Diagnostic procedures: moderate  Management options: moderate    Patient Progress  Patient progress: stable      Final diagnoses:   ST elevation myocardial infarction (STEMI), unspecified artery (CMS/HCC)   Chest pain due to myocardial ischemia, unspecified ischemic chest pain type            Reyna Degroot PA-C  10/13/20 4890

## 2020-10-13 NOTE — H&P
Patient Care Team:  Aileen Grande APRN as PCP - General (Family Medicine)  Serjio Jackson MD as PCP - Family Medicine  Heather Richardson RN as Ambulatory  ([facility])    Chief complaint : Chest pain    Subjective     Patient is a 87 y.o. female with no prior history of coronary artery disease presents to the emergency room with 2 days of stuttering chest discomfort.  The patient's initial twelve-lead electrocardiogram and troponin showed no abnormalities.  The patient continued to have ongoing chest discomfort in the emergency room.  Repeat twelve-lead electrocardiogram demonstrated sinus rhythm with subtle ST segment elevation in leads I and aVL with reciprocal inferior ST segment depression.  Repeat troponin was minimally elevated.  The patient describes a diffuse pressure sensation across her central chest with radiation into her left arm associated with shortness of breath and nausea.  She describes the discomfort as having a 6/10 intensity.  The discomfort occurred at rest and has been present continuously for the last several hours.  She has no prior history of myocardial infarction or coronary revascularization.  She had an echocardiogram earlier this year which showed a normal ejection fraction with no regional wall motion abnormalities.  She is noted to have concentric left ventricular hypertrophy with mild calcific senile valvular aortic stenosis.  She has a history of hypertension and dyslipidemia.  She is a non-smoker.  She denies having orthopnea PND or lower extremity edema.  She has had no dizziness palpitations or syncope.  The patient indicates she has had 2 recent TIAs.  The most recent of which was approximately 2 weeks ago.  She has no residual deficit.  Review of Systems   Pertinent items are noted in HPI    History  Past Medical History:   Diagnosis Date   • Acid reflux    • Allergic    • Cataract    • Cerumen impaction    • Dizziness    • High cholesterol     • Hypertension    • Impaired functional mobility, balance, gait, and endurance    • Influenza    • Reflux gastritis    • Seasonal allergies    • Spinal headache    • Stroke (CMS/HCC)    • Transient ischemic attack    • Wears glasses      Past Surgical History:   Procedure Laterality Date   • APPENDECTOMY     • BREAST BIOPSY Left    • CATARACT EXTRACTION Bilateral    •  SECTION      X2   • COLONOSCOPY     • DILATATION AND CURETTAGE     • EYE CAPSULOTOMY WITH LASER Right 3/20/2017    Procedure: RIGHT EYE CAPSULOTOMY WITH LASER;  Surgeon: Ev Cortez MD;  Location: [facility];  Service:    • HYSTERECTOMY     • NASAL SEPTUM SURGERY     • TOOTH EXTRACTION       Family History   Problem Relation Age of Onset   • Diabetes Son      Social History     Tobacco Use   • Smoking status: Never Smoker   • Smokeless tobacco: Never Used   Substance Use Topics   • Alcohol use: No   • Drug use: No     E-cigarette/Vaping   • E-cigarette/Vaping Use Never User      E-cigarette/Vaping Substances   • Nicotine No    • THC No    • CBD No    • Flavoring No      Medications Prior to Admission   Medication Sig Dispense Refill Last Dose   • amLODIPine (NORVASC) 5 MG tablet Take 1 tablet by mouth Every Night. 30 tablet 0    • lisinopril (PRINIVIL,ZESTRIL) 20 MG tablet Take 1 tablet by mouth Daily. 90 tablet 3    • pravastatin (Pravachol) 40 MG tablet Take 1 tablet by mouth Every Night. For cholesterol. 90 tablet 3      Allergies:  Crestor [rosuvastatin calcium], Lipitor [atorvastatin calcium], and Penicillins    Objective     Vital Signs  Temp:  [97.8 °F (36.6 °C)] 97.8 °F (36.6 °C)  Heart Rate:  [59-81] 81  Resp:  [20] 20  BP: (125-133)/(51-67) 131/53    Physical Exam:      General Appearance:    Alert, cooperative, in no acute distress   Head:    Normocephalic, without obvious abnormality, atraumatic   Eyes:            Lids and lashes normal, conjunctivae and sclerae normal, no   icterus, no pallor, corneas clear, PERRLA   Ears:     Ears appear intact with no abnormalities noted   Throat:   No oral lesions, no thrush, oral mucosa moist   Neck:   No adenopathy, supple, trachea midline, no thyromegaly, no     carotid bruit, no JVD   Back:     No kyphosis present, no scoliosis present, no skin lesions,       erythema or scars, no tenderness to percussion or                   palpation,   range of motion normal   Lungs:     Clear to auscultation,respirations regular, even and                   unlabored    Heart:    Regular rhythm and normal rate, normal S1 and S2, grade 1/6 early-mid peaking systolic ejection murmur at right upper sternal border, no gallop, no rub, no click   Breast Exam:    Deferred   Abdomen:     Normal bowel sounds, no masses, no organomegaly, soft        non-tender, non-distended, no guarding, no rebound                 tenderness   Genitalia:    Deferred   Extremities:   Moves all extremities well, no edema, no cyanosis, no              redness   Pulses:   Pulses palpable and equal bilaterally   Skin:   No bleeding, bruising or rash   Lymph nodes:   No palpable adenopathy   Neurologic:   Cranial nerves 2 - 12 grossly intact, sensation intact, DTR        present and equal bilaterally     Results Review:    I reviewed the patient's new clinical results.    Assessment/Plan       Acute ST elevation myocardial infarction (STEMI) (CMS/[facility])    ST elevation myocardial infarction (STEMI) (CMS/[facility])    Essential hypertension.    Mild senile calcific valvular aortic stenosis    I have recommended emergency coronary angiography with interventional standby.  Ms. Elise has been engaged in a patient level discussion explaining the rationale for proceeding with invasive testing/procedures.  The procedure has been explained to the patient in detail including risks benefits and alternatives.  She expresses understanding and a desire to proceed.    I discussed the patients findings and my recommendations with patient, family and nursing  staff.     Travis Franco MD  10/13/20  17:53 EDT

## 2020-10-14 ENCOUNTER — APPOINTMENT (OUTPATIENT)
Dept: CARDIOLOGY | Facility: HOSPITAL | Age: 85
End: 2020-10-14

## 2020-10-14 LAB
ANION GAP SERPL CALCULATED.3IONS-SCNC: 9.3 MMOL/L (ref 5–15)
BH CV ECHO MEAS - IVSD: 0.9 CM
BH CV ECHO MEAS - LA DIMENSION: 3.4 CM
BH CV ECHO MEAS - LVPWD: 0.9 CM
BH CV ECHO MEAS - RAP SYSTOLE: 3 MMHG
BH CV ECHO MEAS - RVSP: 28 MMHG
BUN SERPL-MCNC: 23 MG/DL (ref 8–23)
BUN/CREAT SERPL: 18.1 (ref 7–25)
CALCIUM SPEC-SCNC: 8.1 MG/DL (ref 8.6–10.5)
CHLORIDE SERPL-SCNC: 109 MMOL/L (ref 98–107)
CHOLEST SERPL-MCNC: 159 MG/DL (ref 0–200)
CO2 SERPL-SCNC: 17.7 MMOL/L (ref 22–29)
CREAT SERPL-MCNC: 1.27 MG/DL (ref 0.57–1)
DEPRECATED RDW RBC AUTO: 48.3 FL (ref 37–54)
ERYTHROCYTE [DISTWIDTH] IN BLOOD BY AUTOMATED COUNT: 12.9 % (ref 12.3–15.4)
GFR SERPL CREATININE-BSD FRML MDRD: 40 ML/MIN/1.73
GLUCOSE SERPL-MCNC: 96 MG/DL (ref 65–99)
HBA1C MFR BLD: 6 % (ref 4.8–5.6)
HCT VFR BLD AUTO: 27.8 % (ref 34–46.6)
HDLC SERPL-MCNC: 49 MG/DL (ref 40–60)
HGB BLD-MCNC: 9 G/DL (ref 12–15.9)
LDLC SERPL CALC-MCNC: 92 MG/DL (ref 0–100)
LDLC/HDLC SERPL: 1.84 {RATIO}
LEFT ATRIUM VOLUME INDEX: 32 ML/M2
LV EF 2D ECHO EST: 65 %
MAXIMAL PREDICTED HEART RATE: 133 BPM
MCH RBC QN AUTO: 33.5 PG (ref 26.6–33)
MCHC RBC AUTO-ENTMCNC: 32.4 G/DL (ref 31.5–35.7)
MCV RBC AUTO: 103.3 FL (ref 79–97)
PLATELET # BLD AUTO: 205 10*3/MM3 (ref 140–450)
PMV BLD AUTO: 10.9 FL (ref 6–12)
POTASSIUM SERPL-SCNC: 4.3 MMOL/L (ref 3.5–5.2)
RBC # BLD AUTO: 2.69 10*6/MM3 (ref 3.77–5.28)
SODIUM SERPL-SCNC: 136 MMOL/L (ref 136–145)
STRESS TARGET HR: 113 BPM
TRIGL SERPL-MCNC: 98 MG/DL (ref 0–150)
VLDLC SERPL-MCNC: 18 MG/DL (ref 5–40)
WBC # BLD AUTO: 6.97 10*3/MM3 (ref 3.4–10.8)

## 2020-10-14 PROCEDURE — 93010 ELECTROCARDIOGRAM REPORT: CPT | Performed by: INTERNAL MEDICINE

## 2020-10-14 PROCEDURE — 80061 LIPID PANEL: CPT | Performed by: INTERNAL MEDICINE

## 2020-10-14 PROCEDURE — 93005 ELECTROCARDIOGRAM TRACING: CPT | Performed by: INTERNAL MEDICINE

## 2020-10-14 PROCEDURE — 83036 HEMOGLOBIN GLYCOSYLATED A1C: CPT | Performed by: INTERNAL MEDICINE

## 2020-10-14 PROCEDURE — 85027 COMPLETE CBC AUTOMATED: CPT | Performed by: INTERNAL MEDICINE

## 2020-10-14 PROCEDURE — 80048 BASIC METABOLIC PNL TOTAL CA: CPT | Performed by: INTERNAL MEDICINE

## 2020-10-14 PROCEDURE — 93306 TTE W/DOPPLER COMPLETE: CPT

## 2020-10-14 PROCEDURE — 99231 SBSQ HOSP IP/OBS SF/LOW 25: CPT | Performed by: INTERNAL MEDICINE

## 2020-10-14 PROCEDURE — 93306 TTE W/DOPPLER COMPLETE: CPT | Performed by: INTERNAL MEDICINE

## 2020-10-14 RX ORDER — ERGOCALCIFEROL (VITAMIN D2) 10 MCG
400 TABLET ORAL DAILY
COMMUNITY

## 2020-10-14 RX ADMIN — ASPIRIN 325 MG: 325 TABLET, COATED ORAL at 08:09

## 2020-10-14 RX ADMIN — CLOPIDOGREL BISULFATE 75 MG: 75 TABLET ORAL at 08:09

## 2020-10-14 RX ADMIN — AMLODIPINE BESYLATE 5 MG: 5 TABLET ORAL at 20:32

## 2020-10-14 RX ADMIN — ACETAMINOPHEN 650 MG: 325 TABLET, FILM COATED ORAL at 16:34

## 2020-10-14 RX ADMIN — PRAVASTATIN SODIUM 40 MG: 20 TABLET ORAL at 20:32

## 2020-10-14 RX ADMIN — LISINOPRIL 20 MG: 20 TABLET ORAL at 08:09

## 2020-10-14 RX ADMIN — HYDROCODONE BITARTRATE AND ACETAMINOPHEN 1 TABLET: 5; 325 TABLET ORAL at 20:32

## 2020-10-14 NOTE — OUTREACH NOTE
Stroke Week 4 Survey      Responses   [facility] [place] facility patient discharged from?  [facility]   Does the patient have one of the following disease processes/diagnoses(primary or secondary)?  Stroke (TIA)   Week 4 attempt successful?  No   Revoke  Readmitted          Stefania Naqvi RN

## 2020-10-14 NOTE — NURSING NOTE
Referral received for Phase II Cardiac Rehab. Staff reviewed chart and patient has qualifying diagnosis for Phase II Cardiac Rehab.  Met with patient, discussed benefits of exercise, program protocol with ed. material provided.

## 2020-10-14 NOTE — PROGRESS NOTES
"Adult Nutrition  Assessment/PES    Patient Name:  Tanya Elise  YOB: 1933  MRN: [id number]  Admit Date:  10/13/2020    Assessment Date:  10/14/2020    Comments:    Recommend:  1. Consider adding renal modification to current diet order as medically appropriate and tolerated.  2. Encourage PO intake. Current intake ~75% x 1 meal.   3. Consider a renal multivitamin with minerals daily.     RD to follow pt and available PRN.      Reason for Assessment     Row Name 10/14/20 1058          Reason for Assessment    Reason For Assessment  per organizational policy;identified at risk by screening criteria;diagnosis/disease state     Diagnosis  cardiac disease;renal disease STEMI, HTN, GERD, CKD 3     Identified At Risk by Screening Criteria  BMI           Anthropometrics     Row Name 10/14/20 1059          Anthropometrics    Height  157.5 cm (62\")        Ideal Body Weight (IBW)    Ideal Body Weight (IBW) (kg)  50.43         Labs/Tests/Procedures/Meds     Row Name 10/14/20 1058          Labs/Procedures/Meds    Lab Results Reviewed  reviewed, pertinent     Lab Results Comments  High: Cl, Cr, HgbA1c        Medications    Pertinent Medications Reviewed  reviewed         Physical Findings     Row Name 10/14/20 1059          Physical Findings    Overall Physical Appearance  obese         Estimated/Assessed Needs     Row Name 10/14/20 1059          Calculation Measurements    Weight Used For Calculations  50 kg (110 lb 3.7 oz) IBW     Height  157.5 cm (62\")        Estimated/Assessed Needs    Additional Documentation  Calorie Requirements (Group);Protein Requirements (Group);[place]-St. Jeor Equation (Group);Fluid Requirements (Group)        Calorie Requirements    Weight Used For Calorie Calculations  50 kg (110 lb 3.7 oz) IBW     Estimated Calorie Need Method  [place]-St Jeor     Estimated Calorie Requirement Comment  2780-0629        [place]-St. Jeor Equation    RMR ([place]-St. Jeor Equation)  888.25     " [facility] Hospital[facility] Spears[facility] Activity Factors  -- 1.3 AF        Protein Requirements    Weight Used For Protein Calculations  50 kg (110 lb 3.7 oz) IBW     Est Protein Requirement Amount (gms/kg)  0.8 gm protein 30-40 grams     Estimated Protein Requirements (gms/day)  40        Fluid Requirements    Estimated Fluid Requirement Method  [place]-Segar Formula     [place]-Segar Method (over 20 kg)  2500         Nutrition Prescription Ordered     Row Name 10/14/20 1101          Nutrition Prescription PO    Current PO Diet  Regular     Common Modifiers  Cardiac         Evaluation of Received Nutrient/Fluid Intake     Row Name 10/14/20 1059          PO Evaluation    Number of Days PO Intake Evaluated  1 day     Number of Meals  1     % PO Intake  75               Problem/Interventions:  Problem 1     Row Name 10/14/20 1102          Nutrition Diagnoses Problem 1    Problem 1  Impaired Nutrient Utilization     Etiology (related to)  Medical Diagnosis     Renal  CKD     Signs/Symptoms (evidenced by)  Biochemical     Specific Labs Noted  Creatinine         Problem 2     Row Name 10/14/20 1102          Nutrition Diagnoses Problem 2    Problem 2  Overweight/Obesity     Etiology (related to)  Factors Affecting Nutrition     Signs/Symptoms (evidenced by)  BMI     BMI  35 - 39.9             Intervention Goal     Row Name 10/14/20 1102          Intervention Goal    General  Improved nutrition related lab(s);Meet nutritional needs for age/condition     PO  Meet estimated needs;Tolerate PO;Maintain intake     Weight  No significant weight loss         Nutrition Intervention     Row Name 10/14/20 1102          Nutrition Intervention    RD/Tech Action  Follow Tx progress;Encourage intake         Nutrition Prescription     Row Name 10/14/20 1102          Nutrition Prescription PO    PO Prescription  Other (comment) Continue diet as medically tolerated     New PO Prescription Ordered?  No, recommended        Other Orders    Obtain Weight  Daily      Obtain Weight Ordered?  No, recommended     Supplement  Vitamin mineral supplement     Supplement Ordered?  No, recommended         Education/Evaluation     Row Name 10/14/20 1103          Education    Education  Will Instruct as appropriate        Monitor/Evaluation    Monitor  Per protocol;PO intake;I&O;Pertinent labs;Weight;Skin status           Electronically signed by:  Chelo Pugh RD  10/14/20 11:03 EDT

## 2020-10-14 NOTE — PLAN OF CARE
Goal Outcome Evaluation:  Plan of Care Reviewed With: patient     Outcome Summary: Pt without complaints overnight.  VSS.  Remains on 2L NC O2.

## 2020-10-14 NOTE — PROGRESS NOTES
"[facility] Progress note     LOS: 1 day   Patient Care Team:  Aileen Grande APRN as PCP - General (Family Medicine)  Serjio Jackson MD as PCP - Family Medicine    Chief Complaint: Chest pain    Subjective:    Interval History: The patient underwent emergency coronary angiography yesterday demonstrating a thrombotically occluded second diagonal branch of the left anterior descending.  This lesion was treated with angioplasty and stenting.  She is remained stable and angina free overnight.  She has manifested no clinical signs or symptoms of overt congestive heart failure.  She has had no arrhythmia.  Hemoglobin and hematocrit have decreased compared to her baseline presenting anemia.    Patient Complaints: none  Patient Denies:   Chest pain, shortness of breath, orthopnea, peripheral edema, palpitations, cough, sputum production, hemoptysis, abdominal pain, nausea, vomiting, diarrhea, fevers chills or night sweats  History taken from: patient    Review of Systems:   All systems were reviewed and negative       Objective:    Vital Sign Min/Max for last 24 hours  Temp  Min: 97.8 °F (36.6 °C)  Max: 98.2 °F (36.8 °C)   BP  Min: 91/49  Max: 133/67   Pulse  Min: 56  Max: 81   Resp  Min: 16  Max: 20   SpO2  Min: 89 %  Max: 100 %   Flow (L/min)  Min: 2  Max: 2   Weight  Min: 86.2 kg (190 lb)  Max: 90.4 kg (199 lb 4 oz)     Flowsheet Rows      First Filed Value   Admission Height  157.5 cm (62\") Documented at 10/13/2020 1248   Admission Weight  86.2 kg (190 lb) Documented at 10/13/2020 1248          Physical Exam:     General Appearance:    Alert, cooperative, in no acute distress   Head:    Normocephalic, without obvious abnormality, atraumatic   Eyes:            Lids and lashes normal, conjunctivae and sclerae normal, no   icterus, no pallor, corneas clear, PERRLA   Ears:    Ears appear intact with no abnormalities noted   Throat:   No oral lesions, no thrush, oral mucosa moist   Neck:   No adenopathy, " supple, trachea midline, no thyromegaly, no     carotid bruit, no JVD   Back:     No kyphosis present, no scoliosis present, no skin lesions,       erythema or scars, no tenderness to percussion or                   palpation,   range of motion normal   Lungs:     Clear to auscultation,respirations regular, even and                   unlabored    Heart:    Regular rhythm and normal rate, normal S1 and S2, grade 1/6 systolic ejection murmur at right upper sternal border, no gallop, no rub, no click   Breast Exam:    Deferred   Abdomen:     Normal bowel sounds, no masses, no organomegaly, soft        non-tender, non-distended, no guarding, no rebound                 tenderness   Genitalia:    Deferred   Extremities:   Moves all extremities well, no edema, no cyanosis, no              redness   Pulses:   Pulses palpable and equal bilaterally   Skin:   No bleeding, bruising or rash   Lymph nodes:   No palpable adenopathy   Neurologic:   Cranial nerves 2 - 12 grossly intact, sensation intact, DTR        present and equal bilaterally        Results Review:     I reviewed the patient's new clinical results.  Results from last 7 days   Lab Units 10/14/20  0520   HEMOGLOBIN A1C % 6.00*     Results from last 7 days   Lab Units 10/14/20  0520   SODIUM mmol/L 136   POTASSIUM mmol/L 4.3   CHLORIDE mmol/L 109*   CO2 mmol/L 17.7*   BUN mg/dL 23   CREATININE mg/dL 1.27*   GLUCOSE mg/dL 96   CALCIUM mg/dL 8.1*     Results from last 7 days   Lab Units 10/14/20  0520 10/13/20  1312   WBC 10*3/mm3 6.97 7.14   HEMOGLOBIN g/dL 9.0* 10.0*   HEMATOCRIT % 27.8* 30.2*   PLATELETS 10*3/mm3 205 227         Physical Exam    Medication Review: yes    Assessment/Plan:      Acute ST elevation myocardial infarction (STEMI) (CMS/[facility])    ST elevation myocardial infarction (STEMI) (CMS/[facility])      The patient is doing well after primary PCI to her occluded second diagonal branch of left anterior aylqqsfqoi-qhuedjf-njgfkgd artery.  She will transfer out  of the intensive care unit today.  We will check stools for occult blood.    Plan for disposition:Where: home and When:  tomorrow    Travis Franco MD  10/14/20  09:54 EDT

## 2020-10-14 NOTE — PROGRESS NOTES
Discharge Planning Assessment  [facility]     Patient Name: Tanya Elise  MRN: [id number]  Today's Date: 10/14/2020    Admit Date: 10/13/2020    Discharge Needs Assessment     Row Name 10/14/20 1042       Living Environment    Primary Care Provided by  self;child(urban)    Provides Primary Care For  no one, unable/limited ability to care for self    Family Caregiver if Needed  child(urban), adult    Able to Return to Prior Arrangements  yes       Resource/Environmental Concerns    Transportation Concerns  car, none       Transition Planning    Patient/Family Anticipates Transition to  home    Transportation Anticipated  family or friend will provide       Discharge Needs Assessment    Readmission Within the Last 30 Days  current reason for admission unrelated to previous admission    Equipment Currently Used at Home  walker, standard;other (see comments) has pulse oximeter    Concerns to be Addressed  medication    Concerns Comments  encouraged to use Meds to Beds to ensure can obtain new Rx.  Pt is in agreement.  Nurse notified.    Anticipated Changes Related to Illness  none    Equipment Needed After Discharge  none    Outpatient/Agency/Support Group Needs  outpatient therapy cardiac rehab    Provided Post Acute Provider List?  N/A    Provided Post Acute Provider Quality & Resource List?  N/A        Discharge Plan     Row Name 10/14/20 1047       Plan    Plan  Lives at home with son who is very supportive, No concerns with transportation or food insecurity.  Has information at home regarding ACP and plans on completing upon discharge.  Encouraged to use Meds to Beds to obtain discharge medication and is agreeable.No further discharge needs identified.        Continued Care and Services - Admitted Since 10/13/2020    Coordination has not been started for this encounter.       Expected Discharge Date and Time     Expected Discharge Date Expected Discharge Time    Oct 17, 2020         Demographic Summary     Row  Name 10/14/20 1037       General Information    Admission Type  inpatient    Arrived From  home    Referral Source  patient    Reason for Consult  care coordination/care conference    Preferred Language  English     Used During This Interaction  no        Functional Status     Row Name 10/14/20 1039       Functional Status    Functional Status Comments  sitting in chair. Denies any difficulties       Functional Status, IADL    Medications  independent    Meal Preparation  other (see comments);assistive person son assists    Housekeeping  other (see comments);assistive person son assists    Laundry  assistive person    Shopping  assistive person       Mental Status Summary    Recent Changes in Mental Status/Cognitive Functioning  ability to speak clearly;ability to understand       Employment/    Employment Status  retired        Psychosocial     Row Name 10/14/20 1041       Coping/Stress    Major Change/Loss/Stressor  illness    Sources of Support  adult child(urban)    Understanding of Condition and Treatment  adequate understanding of medical condition;adequate understanding of treatment        Abuse/Neglect    No documentation.       Legal    No documentation.       Substance Abuse    No documentation.       Patient Forms    No documentation.           Elyse Crowell RN

## 2020-10-15 LAB
ACT BLD: 412 SECONDS (ref 82–152)
ANION GAP SERPL CALCULATED.3IONS-SCNC: 6.9 MMOL/L (ref 5–15)
BUN SERPL-MCNC: 26 MG/DL (ref 8–23)
BUN/CREAT SERPL: 16.3 (ref 7–25)
CALCIUM SPEC-SCNC: 8.7 MG/DL (ref 8.6–10.5)
CHLORIDE SERPL-SCNC: 111 MMOL/L (ref 98–107)
CO2 SERPL-SCNC: 21.1 MMOL/L (ref 22–29)
CREAT SERPL-MCNC: 1.6 MG/DL (ref 0.57–1)
DEPRECATED RDW RBC AUTO: 48.6 FL (ref 37–54)
ERYTHROCYTE [DISTWIDTH] IN BLOOD BY AUTOMATED COUNT: 13.1 % (ref 12.3–15.4)
GFR SERPL CREATININE-BSD FRML MDRD: 30 ML/MIN/1.73
GLUCOSE SERPL-MCNC: 108 MG/DL (ref 65–99)
HCT VFR BLD AUTO: 27.3 % (ref 34–46.6)
HGB BLD-MCNC: 8.9 G/DL (ref 12–15.9)
MCH RBC QN AUTO: 33.5 PG (ref 26.6–33)
MCHC RBC AUTO-ENTMCNC: 32.6 G/DL (ref 31.5–35.7)
MCV RBC AUTO: 102.6 FL (ref 79–97)
PLATELET # BLD AUTO: 206 10*3/MM3 (ref 140–450)
PMV BLD AUTO: 11.2 FL (ref 6–12)
POTASSIUM SERPL-SCNC: 4.9 MMOL/L (ref 3.5–5.2)
RBC # BLD AUTO: 2.66 10*6/MM3 (ref 3.77–5.28)
SODIUM SERPL-SCNC: 139 MMOL/L (ref 136–145)
WBC # BLD AUTO: 5.96 10*3/MM3 (ref 3.4–10.8)

## 2020-10-15 PROCEDURE — 80048 BASIC METABOLIC PNL TOTAL CA: CPT | Performed by: INTERNAL MEDICINE

## 2020-10-15 PROCEDURE — 85027 COMPLETE CBC AUTOMATED: CPT | Performed by: INTERNAL MEDICINE

## 2020-10-15 PROCEDURE — 25010000002 DIPHENHYDRAMINE PER 50 MG: Performed by: INTERNAL MEDICINE

## 2020-10-15 RX ADMIN — ASPIRIN 325 MG: 325 TABLET, COATED ORAL at 09:21

## 2020-10-15 RX ADMIN — DIPHENHYDRAMINE HYDROCHLORIDE 25 MG: 50 INJECTION, SOLUTION INTRAMUSCULAR; INTRAVENOUS at 15:48

## 2020-10-15 RX ADMIN — AMLODIPINE BESYLATE 5 MG: 5 TABLET ORAL at 21:17

## 2020-10-15 RX ADMIN — ACETAMINOPHEN 650 MG: 325 TABLET, FILM COATED ORAL at 15:48

## 2020-10-15 RX ADMIN — ACETAMINOPHEN 650 MG: 325 TABLET, FILM COATED ORAL at 21:17

## 2020-10-15 RX ADMIN — PRAVASTATIN SODIUM 40 MG: 20 TABLET ORAL at 21:17

## 2020-10-15 RX ADMIN — CLOPIDOGREL BISULFATE 75 MG: 75 TABLET ORAL at 09:21

## 2020-10-15 RX ADMIN — SODIUM CHLORIDE, PRESERVATIVE FREE 10 ML: 5 INJECTION INTRAVENOUS at 09:21

## 2020-10-15 RX ADMIN — LISINOPRIL 20 MG: 20 TABLET ORAL at 09:21

## 2020-10-15 NOTE — PLAN OF CARE
Goal Outcome Evaluation:  Plan of Care Reviewed With: patient     Outcome Summary: VSS. No acute events noted.  Pt on 2L O2.  Poss DC home today.

## 2020-10-16 ENCOUNTER — READMISSION MANAGEMENT (OUTPATIENT)
Dept: CALL CENTER | Facility: HOSPITAL | Age: 85
End: 2020-10-16

## 2020-10-16 VITALS
RESPIRATION RATE: 18 BRPM | WEIGHT: 199.25 LBS | HEIGHT: 62 IN | TEMPERATURE: 97.9 F | DIASTOLIC BLOOD PRESSURE: 74 MMHG | BODY MASS INDEX: 36.67 KG/M2 | OXYGEN SATURATION: 96 % | HEART RATE: 67 BPM | SYSTOLIC BLOOD PRESSURE: 143 MMHG

## 2020-10-16 PROCEDURE — 99239 HOSP IP/OBS DSCHRG MGMT >30: CPT | Performed by: INTERNAL MEDICINE

## 2020-10-16 PROCEDURE — 25010000002 DIPHENHYDRAMINE PER 50 MG: Performed by: INTERNAL MEDICINE

## 2020-10-16 RX ORDER — NITROGLYCERIN 0.4 MG/1
0.4 TABLET SUBLINGUAL
Status: DISCONTINUED | OUTPATIENT
Start: 2020-10-16 | End: 2020-10-16 | Stop reason: HOSPADM

## 2020-10-16 RX ORDER — CARVEDILOL 3.12 MG/1
3.12 TABLET ORAL 2 TIMES DAILY WITH MEALS
Qty: 60 TABLET | Refills: 11 | Status: SHIPPED | OUTPATIENT
Start: 2020-10-16 | End: 2021-10-07 | Stop reason: SDUPTHER

## 2020-10-16 RX ORDER — CARVEDILOL 3.12 MG/1
3.12 TABLET ORAL 2 TIMES DAILY WITH MEALS
Status: DISCONTINUED | OUTPATIENT
Start: 2020-10-16 | End: 2020-10-16 | Stop reason: HOSPADM

## 2020-10-16 RX ORDER — CLOPIDOGREL BISULFATE 75 MG/1
75 TABLET ORAL DAILY
Qty: 30 TABLET | Refills: 11 | Status: SHIPPED | OUTPATIENT
Start: 2020-10-17 | End: 2021-10-07 | Stop reason: SDUPTHER

## 2020-10-16 RX ORDER — FUROSEMIDE 40 MG/1
40 TABLET ORAL DAILY
Qty: 20 TABLET | Refills: 11 | Status: SHIPPED | OUTPATIENT
Start: 2020-10-16 | End: 2020-11-21 | Stop reason: HOSPADM

## 2020-10-16 RX ORDER — FUROSEMIDE 40 MG/1
40 TABLET ORAL DAILY
Status: DISCONTINUED | OUTPATIENT
Start: 2020-10-16 | End: 2020-10-16 | Stop reason: HOSPADM

## 2020-10-16 RX ADMIN — DIPHENHYDRAMINE HYDROCHLORIDE 25 MG: 50 INJECTION, SOLUTION INTRAMUSCULAR; INTRAVENOUS at 10:26

## 2020-10-16 RX ADMIN — LISINOPRIL 20 MG: 20 TABLET ORAL at 09:14

## 2020-10-16 RX ADMIN — CLOPIDOGREL BISULFATE 75 MG: 75 TABLET ORAL at 09:14

## 2020-10-16 RX ADMIN — ASPIRIN 325 MG: 325 TABLET, COATED ORAL at 09:14

## 2020-10-16 NOTE — PROGRESS NOTES
Case Management Discharge Note      Final Note: F/U with pt re DCP.  She states no change in plans; she is going home.  She confirms that her son currently lives with her and her daughter will be coming to stay with her as well.  She denies any needs at this time.    Provided Post Acute Provider List?: N/A  Provided Post Acute Provider Quality & Resource List?: N/A                       Transportation Services  Private: Car    Final Discharge Disposition Code: 01 - home or self-care

## 2020-10-16 NOTE — PLAN OF CARE
Goal Outcome Evaluation:  Plan of Care Reviewed With: patient  Progress: improving  Outcome Summary: VSS. NSR on the monitor. Unable to obtain stool sample. Patient hoping to be discharged home today.

## 2020-10-16 NOTE — DISCHARGE SUMMARY
"[facility]-Cardiology Discharge Summary    Date of Discharge:  10/16/2020    Discharge Diagnosis: ST elevation myocardial infarction    Presenting Problem/History of Present Illness  ST elevation myocardial infarction (STEMI), unspecified artery (CMS/[facility]) [I21.3]        Hospital Course  Patient is a 87 y.o. female chest discomfort.  The patient's twelve-lead electrocardiogram demonstrated \"high-lateral\" ST segment elevation with reciprocal inferolateral ST segment depression.  Emergency coronary angiography disclosed a thrombotically occluded diagonal branch of the left anterior descending which was treated with angioplasty and drug-eluting stent placement.  The patient was noted to have nonobstructive disease elsewhere.  The right coronary artery was a small nondominant vessel with severe disease not amenable to revascularization.  An echocardiogram was performed which showed a normal ejection fraction with no regional wall motion abnormalities.  The patient had no recurrent chest discomfort during her hospitalization and her ECG normalized with return of ST segments to baseline.  The patient has been started on dual antiplatelet therapy with 325 mg of aspirin once per day and Plavix 75 mg once per day.  She will continue Pravachol at 40 mg orally once per day as she has been previously intolerant to high potency Lipitor and Crestor.  The patient may require a PCSK9 inhibitor added to her Pravachol in order to achieve cholesterol-lowering goals.  The patient has been counseled regarding heart healthy diet and a \"Mediterranean-style diet\" has been recommended.  The patient has been educated regarding the essential need to maintain uninterrupted dual antiplatelet therapy for minimum of 12 months.  She has been educated as to the risk of stent thrombosis with medication noncompliance and expresses understanding.  She is a non-smoker.  The patient has been referred for outpatient cardiac rehab.  She will follow-up in our " office in 1 to 2 weeks.    Procedures Performed  Procedure(s):  Bilateral selective coronary angiography  Balloon angioplasty-diagonal branch of left anterior descending  Drug-eluting stent placement-diagonal branch of left anterior descending  Echocardiogram       Consults:   Consults     No orders found from 9/14/2020 to 10/14/2020.          Pertinent Test Results: See cardiac catheterization and echocardiogram reports    Echo EF Estimated  Lab Results   Component Value Date    ECHOEFEST 65 10/14/2020       Condition on Discharge: Stable      Vital Signs  Temp:  [97.8 °F (36.6 °C)-99.7 °F (37.6 °C)] 97.9 °F (36.6 °C)  Heart Rate:  [67-91] 67  Resp:  [18] 18  BP: (125-143)/(50-77) 143/74    Physical Exam:     General Appearance:    Alert, cooperative, in no acute distress   Head:    Normocephalic, without obvious abnormality, atraumatic   Eyes:            Lids and lashes normal, conjunctivae and sclerae normal, no   icterus, no pallor, corneas clear, PERRLA   Ears:    Ears appear intact with no abnormalities noted   Throat:   No oral lesions, no thrush, oral mucosa moist   Neck:   No adenopathy, supple, trachea midline, no thyromegaly, no     carotid bruit, no JVD   Back:     No kyphosis present, no scoliosis present, no skin lesions,       erythema or scars, no tenderness to percussion or                   palpation,   range of motion normal   Lungs:     Clear to auscultation,respirations regular, even and                   unlabored    Heart:    Regular rhythm and normal rate, normal S1 and S2, no            murmur, no gallop, no rub, no click   Breast Exam:    Deferred   Abdomen:     Normal bowel sounds, no masses, no organomegaly, soft        non-tender, non-distended, no guarding, no rebound                 tenderness   Genitalia:    Deferred   Extremities:   Moves all extremities well, no edema, no cyanosis, no              redness   Pulses:   Pulses palpable and equal bilaterally   Skin:   No bleeding,  bruising or rash   Lymph nodes:   No palpable adenopathy   Neurologic:   Cranial nerves 2 - 12 grossly intact, sensation intact, DTR        present and equal bilaterally       Discharge Disposition  Home or Self Care    Discharge Medications     Discharge Medications      New Medications      Instructions Start Date   aspirin 325 MG EC tablet   325 mg, Oral, Daily   Start Date: October 17, 2020     carvedilol 3.125 MG tablet  Commonly known as: COREG   3.125 mg, Oral, 2 Times Daily With Meals      clopidogrel 75 MG tablet  Commonly known as: PLAVIX   75 mg, Oral, Daily   Start Date: October 17, 2020     furosemide 40 MG tablet  Commonly known as: LASIX   40 mg, Oral, Daily      PHARMACY CONSULT   Does not apply, Continuous PRN         Continue These Medications      Instructions Start Date   amLODIPine 5 MG tablet  Commonly known as: NORVASC   5 mg, Oral, Nightly      lisinopril 20 MG tablet  Commonly known as: PRINIVIL,ZESTRIL   20 mg, Oral, Daily      pravastatin 40 MG tablet  Commonly known as: Pravachol   40 mg, Oral, Nightly, For cholesterol.      Vitamin D (Cholecalciferol) 10 MCG (400 UNIT) tablet  Commonly known as: CHOLECALCIFEROL   400 Units, Oral, Daily             Discharge Diet: Cardiac.  Consistent carbohydrate.    Activity at Discharge: Routine post myocardial infarction restrictions    Follow-up Appointments  Future Appointments   Date Time Provider Department Center   12/22/2020  1:30 PM Reena Castaneda PA [facility] None     The patient will follow up in the outpatient cardiology clinic in our office in 1 week    Test Results Pending at Discharge  Pending Labs     Order Current Status    Green Top (Gel) In process    Green Top (No Gel) In process    [place] Draw In process           Travis Franco MD  10/16/20  13:36 EDT    Time: 45 minutes

## 2020-10-16 NOTE — OUTREACH NOTE
Prep Survey      Responses   [facility] facility patient discharged from?  [place]   Is LACE score < 7 ?  No   Eligibility  [facility]   Date of Admission  10/13/20   Date of Discharge  10/16/20   Discharge Disposition  Home or Self Care   Discharge diagnosis  Acute ST elevation myocardial infarction (STEMI   Does the patient have one of the following disease processes/diagnoses(primary or secondary)?  Acute MI (STEMI,NSTEMI)   Does the patient have Home health ordered?  No   Is there a DME ordered?  No   Prep survey completed?  Yes          Rose Davis RN

## 2020-10-16 NOTE — PLAN OF CARE
Goal Outcome Evaluation:  Patient VSS this shift, up to chair and encourage ambulation. Complaints of some right ear pressure

## 2020-10-18 ENCOUNTER — TRANSITIONAL CARE MANAGEMENT TELEPHONE ENCOUNTER (OUTPATIENT)
Dept: CALL CENTER | Facility: HOSPITAL | Age: 85
End: 2020-10-18

## 2020-10-18 NOTE — OUTREACH NOTE
Call Center TCM Note      Responses   [facility] patient discharged from?  [place]   Does the patient have one of the following disease processes/diagnoses(primary or secondary)?  Acute MI (STEMI,NSTEMI)   TCM attempt successful?  Yes   Call start time  1439   Call end time  1447   Discharge diagnosis  Acute ST elevation myocardial infarction (STEMI   Meds reviewed with patient/caregiver?  Yes   Is the patient having any side effects they believe may be caused by any medication additions or changes?  No   Does the patient have all prescriptions related to this admission filled (includes statins,anticoagulants,HTN meds,anti-arrhythmia meds)  Yes   Prescription comments  Pt only has enough amlodipine for today.    Is the patient taking all medications as directed (includes completed medication regime)?  Yes   Does the patient have a primary care provider?   Yes   Does the patient have an appointment with their PCP,cardiologist,or clinic within 7 days of discharge?  Yes   Comments regarding PCP  Hospital d/c f/u appt is on 10/21/20 at 2:45 pm   Has the patient kept scheduled appointments due by today?  N/A   Psychosocial issues?  No   Did the patient receive a copy of their discharge instructions?  Yes   Nursing interventions  Reviewed instructions with patient   What is the patient's perception of their health status since discharge?  Improving   Nursing interventions  Nurse provided patient education   Is the patient/caregiver able to teach back signs and symptoms of when to call for help immediately:  Sudden discomfort in arms, back, neck or jaw, Sudden chest discomfort, Shortness of breath at any time, Sudden sweating or clammy skin   Nursing interventions  Nurse provided patient education   Is the patient/caregiver able to teach back lifestyle changes to help prevent MIs  Quit smoking, Reducing stress, Managing diabetes, Heart healthy diet [Not a diabetic]   Is the patient/caregiver able to teach back ways  to prevent a second heart attack:  Take medications, Follow up with MD, Manage risk factors   If the patient is a current smoker, are they able to teach back resources for cessation?  Not a smoker   Is the patient/caregiver able to teach back the hierarchy of who to call/visit for symptoms/problems? PCP, Specialist, Home health nurse, Urgent Care, ED, 911  Yes   TCM call completed?  Yes          Sheree Chand RN    10/18/2020, 14:47 EDT

## 2020-10-19 ENCOUNTER — TELEPHONE (OUTPATIENT)
Dept: INTERNAL MEDICINE | Facility: CLINIC | Age: 85
End: 2020-10-19

## 2020-10-19 DIAGNOSIS — E78.00 PURE HYPERCHOLESTEROLEMIA: ICD-10-CM

## 2020-10-19 DIAGNOSIS — E78.00 PURE HYPERCHOLESTEROLEMIA: Primary | ICD-10-CM

## 2020-10-19 RX ORDER — AMLODIPINE BESYLATE 5 MG/1
5 TABLET ORAL NIGHTLY
Qty: 90 TABLET | Refills: 3 | Status: SHIPPED | OUTPATIENT
Start: 2020-10-19 | End: 2020-10-19 | Stop reason: SDUPTHER

## 2020-10-19 RX ORDER — AMLODIPINE BESYLATE 5 MG/1
5 TABLET ORAL NIGHTLY
Qty: 90 TABLET | Refills: 3 | Status: SHIPPED | OUTPATIENT
Start: 2020-10-19 | End: 2021-12-27 | Stop reason: SDUPTHER

## 2020-10-19 NOTE — TELEPHONE ENCOUNTER
Caller: Tanya Elise    Relationship: Self    Best call back number: [phone]       Medication needed: AMLODIPINE 5MG QD      When do you need the refill by: 10/19/20    What details did the patient provide when requesting the medication: WAS PRESCRIBED BY [facility]. SHE IS SUPPOSED TO TAKE IT NIGHTLY FOR A TIA .PLEASE ADVISE.     Does the patient have less than a 3 day supply:  [x] Yes  [] No    What is the patient's preferred pharmacy:  [id number] [address] [place] [address] SHOPPING CENTER AT [facility] SHOPPING [facility] - [phone]  - [phone]   [phone]

## 2020-10-21 ENCOUNTER — OFFICE VISIT (OUTPATIENT)
Dept: INTERNAL MEDICINE | Facility: CLINIC | Age: 85
End: 2020-10-21

## 2020-10-21 VITALS
SYSTOLIC BLOOD PRESSURE: 110 MMHG | OXYGEN SATURATION: 98 % | HEART RATE: 72 BPM | HEIGHT: 62 IN | TEMPERATURE: 97.8 F | WEIGHT: 193 LBS | DIASTOLIC BLOOD PRESSURE: 70 MMHG | BODY MASS INDEX: 35.51 KG/M2

## 2020-10-21 DIAGNOSIS — M62.830 BACK MUSCLE SPASM: ICD-10-CM

## 2020-10-21 DIAGNOSIS — G45.9 TIA (TRANSIENT ISCHEMIC ATTACK): ICD-10-CM

## 2020-10-21 DIAGNOSIS — E78.00 PURE HYPERCHOLESTEROLEMIA: ICD-10-CM

## 2020-10-21 DIAGNOSIS — I10 ESSENTIAL HYPERTENSION: ICD-10-CM

## 2020-10-21 DIAGNOSIS — Z09 HOSPITAL DISCHARGE FOLLOW-UP: Primary | ICD-10-CM

## 2020-10-21 DIAGNOSIS — E66.01 MORBIDLY OBESE (HCC): ICD-10-CM

## 2020-10-21 DIAGNOSIS — I25.10 CAD IN NATIVE ARTERY: ICD-10-CM

## 2020-10-21 PROCEDURE — 99496 TRANSJ CARE MGMT HIGH F2F 7D: CPT | Performed by: NURSE PRACTITIONER

## 2020-10-21 RX ORDER — BACLOFEN 10 MG/1
10 TABLET ORAL 2 TIMES DAILY PRN
Qty: 20 TABLET | Refills: 0 | Status: ON HOLD | OUTPATIENT
Start: 2020-10-21 | End: 2020-11-21 | Stop reason: SDUPTHER

## 2020-10-21 RX ORDER — BACLOFEN 10 MG/1
10 TABLET ORAL 3 TIMES DAILY
COMMUNITY
End: 2020-10-21 | Stop reason: DRUGHIGH

## 2020-10-21 RX ORDER — FEXOFENADINE HYDROCHLORIDE 60 MG/1
60 TABLET, FILM COATED ORAL DAILY PRN
COMMUNITY

## 2020-10-21 NOTE — PROGRESS NOTES
"Transitional Care Follow Up Visit  Subjective     Tanya Codi Elise is a 87 y.o. female who presents for a transitional care management visit.    Within 48 business hours after discharge our office contacted her via telephone to coordinate her care and needs.      I reviewed and discussed the details of that call along with the discharge summary, hospital problems, inpatient lab results, inpatient diagnostic studies, and consultation reports with Tanya.     Current outpatient and discharge medications have been reconciled for the patient.  Reviewed by: ESTEPHANIE Castillo      Date of TCM Phone Call 10/16/2020   [facility]   Date of Admission 10/13/2020   Date of Discharge 10/16/2020   Discharge Disposition Home or Self Care     Risk for Readmission (LACE) Score: 11 (10/16/2020  6:00 AM)      History of Present Illness   Course During Hospital Stay:  Presented to [facility] ED on 10/13/2020 with mid chest pain that radiated to the upper shoulders and back, chest heaviness, feeling of a tight band\" around her chest.  She had recently been discharged from [facility] on 9/16/2020 following TIA.  EKG showed high lateral ST segment elevation.  Emergency heart cath identified blockage, drug-eluting stent placed.  Echocardiogram showed normal EF.  No chest pain or discomfort during hospitalization, EKG normalized prior to discharge on 10/16/2020.  She is on dual antiplatelet therapy with  mg, Plavix 75 mg once a day, will remain on this regimen for 12 months.  She is to continue Pravachol 40 mg once daily, did not tolerate Lipitor, Crestor.  She was advised to eat a Mediterranean-style diet.  She will begin outpatient cardiac rehab within the next 2 weeks.  She is taking amlodipine 5 mg, carvedilol 3.125 mg, lisinopril 20 mg as prescribed for hypertension.  Follow-up appointment scheduled with Dr. Franco on 10/28/2020.  She lives with her son, who has been helping her make dietary changes.    She is " concerned regarding right mid back pain that has been present for 6 months or longer.  She was advised upon discharge to return to hospital with any abnormal pain.  Pain does improve with applied pressure.  Not associated with exertion, diaphoresis, nausea, arm pain, chest pain, shortness of breath, change in vision, headache.    Using cane for support during ambulation today.  Keeping track of meals, bowel movements, BP on forms created by her daughter that also help her keep track of medication schedule.       The following portions of the patient's history were reviewed and updated as appropriate: allergies, current medications, past family history, past medical history, past social history, past surgical history and problem list.    Review of Systems   Constitutional: Positive for fatigue. Negative for activity change and appetite change.   Respiratory: Negative.    Cardiovascular: Negative for chest pain and leg swelling.   Gastrointestinal: Negative for constipation, diarrhea, nausea and vomiting.   Skin: Negative for pallor.   Neurological: Negative for dizziness, facial asymmetry, speech difficulty, light-headedness, numbness and headaches.       Objective   Physical Exam  Constitutional:       Appearance: She is obese. She is not ill-appearing.   HENT:      Head: Normocephalic.      Right Ear: External ear normal.      Left Ear: External ear normal.   Eyes:      Extraocular Movements: Extraocular movements intact.      Conjunctiva/sclera: Conjunctivae normal.      Pupils: Pupils are equal, round, and reactive to light.   Neck:      Musculoskeletal: Normal range of motion and neck supple.   Cardiovascular:      Rate and Rhythm: Normal rate and regular rhythm.      Pulses: Normal pulses.      Heart sounds: Normal heart sounds.   Pulmonary:      Effort: Pulmonary effort is normal.      Breath sounds: Normal breath sounds.   Musculoskeletal:      Comments: Right paraspinal thoracic tenderness. No bruising,  edema, rash.     Skin:     General: Skin is warm.      Capillary Refill: Capillary refill takes less than 2 seconds.   Neurological:      Mental Status: She is alert and oriented to person, place, and time.      Coordination: Coordination normal.      Gait: Gait normal.      Comments: CN II-XII normal   Psychiatric:         Mood and Affect: Mood normal.         Behavior: Behavior normal.         Thought Content: Thought content normal.         Assessment/Plan   Diagnoses and all orders for this visit:    1. Hospital discharge follow-up (Primary)    2. CAD in native artery    3. TIA (transient ischemic attack)    4. Pure hypercholesterolemia    5. Essential hypertension    6. Morbidly obese (CMS/HCC)        - Patient's Body mass index is 35.3 kg/m². BMI is above normal parameters. Recommendations include: nutrition counseling. She has asked several questions about Mediterranean Diet. Refused referral to dietician.  Advised to see if there are any cookbooks available from the library, recipes online that are heart healthy or based on Mediterranean Diet.  She has lost 6 pounds since admission to [facility].      7. Back muscle spasm  -     baclofen (LIORESAL) 10 MG tablet; Take 1 tablet by mouth 2 (Two) Times a Day As Needed for Muscle Spasms.  Dispense: 20 tablet; Refill: 0  - Heat to back prn pain, for 30 minutes every 4-6 hours with barrier between skin and heat source.    - Gentle stretches to back am and pm     Continue medications as prescribed.  She is aware she must continue taking BP medications, dual anticoag therapy, pravachol.  Monitor for s/s MI, TIA - verbalized understanding of what those signs and symptoms are.      Keep scheduled follow up appointment with Dr Franco, cardiologist.  Go to cardiac rehab as scheduled.

## 2020-10-26 ENCOUNTER — READMISSION MANAGEMENT (OUTPATIENT)
Dept: CALL CENTER | Facility: HOSPITAL | Age: 85
End: 2020-10-26

## 2020-10-26 NOTE — OUTREACH NOTE
AMI Week 2 Survey      Responses   [facility] patient discharged from?  [facility]   Does the patient have one of the following disease processes/diagnoses(primary or secondary)?  Acute MI (STEMI,NSTEMI)   Week 2 attempt successful?  No   Unsuccessful attempts  Attempt 1          Lurdes Alford LPN

## 2020-10-27 ENCOUNTER — READMISSION MANAGEMENT (OUTPATIENT)
Dept: CALL CENTER | Facility: HOSPITAL | Age: 85
End: 2020-10-27

## 2020-10-27 NOTE — OUTREACH NOTE
AMI Week 2 Survey      Responses   [facility] patient discharged from?  [facility]   Does the patient have one of the following disease processes/diagnoses(primary or secondary)?  Acute MI (STEMI,NSTEMI)   Week 2 attempt successful?  Yes   Call start time  1634   Call end time  1636   Discharge diagnosis  Acute ST elevation myocardial infarction (STEMI   Meds reviewed with patient/caregiver?  Yes   Is the patient having any side effects they believe may be caused by any medication additions or changes?  No   Is the patient taking all medications as directed (includes completed medication regime)?  Yes   Comments regarding appointments  Has a cardiology appt tomorrow. 10/28   Does the patient have a primary care provider?   Yes   Does the patient have an appointment with their PCP,cardiologist,or clinic within 7 days of discharge?  Yes   Has the patient kept scheduled appointments due by today?  Yes   Has home health visited the patient within 72 hours of discharge?  N/A   Psychosocial issues?  No   Did the patient receive a copy of their discharge instructions?  Yes   Nursing interventions  Reviewed instructions with patient   What is the patient's perception of their health status since discharge?  Improving   Nursing interventions  Nurse provided patient education   Is the patient/caregiver able to teach back signs and symptoms of when to call for help immediately:  Sudden discomfort in arms, back, neck or jaw, Sudden chest discomfort, Shortness of breath at any time, Sudden sweating or clammy skin   Nursing interventions  Nurse provided patient education   Is the patient/caregiver able to teach back lifestyle changes to help prevent MIs  Quit smoking, Reducing stress, Managing diabetes, Heart healthy diet   Is the patient/caregiver able to teach back ways to prevent a second heart attack:  Take medications, Follow up with MD, Manage risk factors   If the patient is a current smoker, are they able to teach  back resources for cessation?  Not a smoker   Is the patient/caregiver able to teach back the hierarchy of who to call/visit for symptoms/problems? PCP, Specialist, Home health nurse, Urgent Care, ED, 911  Yes   Week 2 call completed?  Yes          Herbert Mooney RN

## 2020-10-28 ENCOUNTER — OFFICE VISIT (OUTPATIENT)
Dept: CARDIOLOGY | Facility: CLINIC | Age: 85
End: 2020-10-28

## 2020-10-28 VITALS
HEART RATE: 62 BPM | OXYGEN SATURATION: 96 % | HEIGHT: 62 IN | BODY MASS INDEX: 35.15 KG/M2 | SYSTOLIC BLOOD PRESSURE: 110 MMHG | WEIGHT: 191 LBS | DIASTOLIC BLOOD PRESSURE: 54 MMHG

## 2020-10-28 DIAGNOSIS — I10 ESSENTIAL HYPERTENSION: ICD-10-CM

## 2020-10-28 DIAGNOSIS — I25.10 CORONARY ARTERY DISEASE INVOLVING NATIVE CORONARY ARTERY OF NATIVE HEART WITHOUT ANGINA PECTORIS: Primary | ICD-10-CM

## 2020-10-28 DIAGNOSIS — E78.5 DYSLIPIDEMIA: ICD-10-CM

## 2020-10-28 PROCEDURE — 99214 OFFICE O/P EST MOD 30 MIN: CPT | Performed by: INTERNAL MEDICINE

## 2020-10-28 NOTE — PROGRESS NOTES
"    Subjective:     Encounter Date:10/28/2020      Patient ID: Tanya Elise is a 87 y.o. female.    Chief Complaint: Coronary artery disease  HPI  This is an 87-year-old female patient who was recently hospitalized for a \"high-lateral\" ST elevation myocardial infarction.  Emergency coronary angiography disclosed thrombotic occlusion of the second diagonal branch which was treated with angioplasty and placement of a drug-eluting stent.  Her main epicardial coronary arteries including the left main, left anterior descending, circumflex and right coronary arteries had no high-grade focal obstructive disease.  The patient's post-ACS left ventricular ejection fraction was greater than 60%.  She had no significant valvular abnormalities.  The patient was noted to have significant left ventricular hypertrophy with grade 2 left ventricular diastolic dysfunction and evidence of elevated mean left atrial pressure at rest.  There was no evidence of pericardial disease.  The patient indicates that since hospital discharge she has had some \"issues\" with her escalation of medical antihypertensive therapy.  She indicates that she has an overall sense of malaise with fatigue.  She was previously experiencing some degree of dizziness on abrupt posture change but this has gradually improved.  She reports compliance with her medications with no perceived side effects.  The patient has no chest discomfort at rest or with activity.  There is no exertional chest arm neck jaw shoulder or back discomfort.  There is no orthopnea PND or lower extremity edema.  There is no dizziness palpitations or syncope.  She has no shortness of breath at rest or with activity.  She reports some tingling in her fingers and toes.  She is also noticed intermittent \"drawing\" of the fourth and third fingers of both hands.  She has had no myalgias.  She has had no bleeding issues related to dual antiplatelet therapy.  The following portions of the " patient's history were reviewed and updated as appropriate: allergies, current medications, past family history, past medical history, past social history, past surgical history and problem  Review of Systems   Constitution: Negative for chills, diaphoresis, fever, malaise/fatigue, weight gain and weight loss.   HENT: Negative for ear discharge, hearing loss, hoarse voice and nosebleeds.    Eyes: Negative for discharge, double vision, pain and photophobia.   Cardiovascular: Negative for chest pain, claudication, cyanosis, dyspnea on exertion, irregular heartbeat, leg swelling, near-syncope, orthopnea, palpitations, paroxysmal nocturnal dyspnea and syncope.   Respiratory: Negative for cough, hemoptysis, shortness of breath, sputum production and wheezing.    Endocrine: Negative for cold intolerance, heat intolerance, polydipsia, polyphagia and polyuria.   Hematologic/Lymphatic: Negative for adenopathy and bleeding problem. Does not bruise/bleed easily.   Skin: Negative for color change, flushing, itching and rash.   Musculoskeletal: Negative for muscle cramps, muscle weakness, myalgias and stiffness.   Gastrointestinal: Negative for abdominal pain, diarrhea, hematemesis, hematochezia, nausea and vomiting.   Genitourinary: Negative for dysuria, frequency and nocturia.   Neurological: Negative for focal weakness, loss of balance, numbness, paresthesias and seizures.   Psychiatric/Behavioral: Negative for altered mental status, hallucinations and suicidal ideas.   Allergic/Immunologic: Negative for HIV exposure, hives and persistent infections.           Current Outpatient Medications:   •  amLODIPine (NORVASC) 5 MG tablet, Take 1 tablet by mouth Every Night., Disp: 90 tablet, Rfl: 3  •  aspirin 325 MG EC tablet, Take 1 tablet by mouth Daily., Disp: 30 tablet, Rfl: 11  •  baclofen (LIORESAL) 10 MG tablet, Take 1 tablet by mouth 2 (Two) Times a Day As Needed for Muscle Spasms. (Patient taking differently: Take 5 mg by  mouth 2 (Two) Times a Day As Needed for Muscle Spasms.), Disp: 20 tablet, Rfl: 0  •  carvedilol (COREG) 3.125 MG tablet, Take 1 tablet by mouth 2 (Two) Times a Day With Meals., Disp: 60 tablet, Rfl: 11  •  clopidogrel (PLAVIX) 75 MG tablet, Take 1 tablet by mouth Daily., Disp: 30 tablet, Rfl: 11  •  fexofenadine (ALLEGRA) 60 MG tablet, Take 60 mg by mouth Daily., Disp: , Rfl:   •  furosemide (LASIX) 40 MG tablet, Take 1 tablet by mouth Daily., Disp: 20 tablet, Rfl: 11  •  lisinopril (PRINIVIL,ZESTRIL) 20 MG tablet, Take 1 tablet by mouth Daily., Disp: 90 tablet, Rfl: 3  •  pravastatin (Pravachol) 40 MG tablet, Take 1 tablet by mouth Every Night. For cholesterol., Disp: 90 tablet, Rfl: 3  •  Vitamin D, Cholecalciferol, (CHOLECALCIFEROL) 10 MCG (400 UNIT) tablet, Take 400 Units by mouth Daily., Disp: , Rfl:     Objective:   Vitals signs and nursing note reviewed.   Constitutional:       Appearance: Healthy appearance. Not in distress.   Eyes:      Pupils: Pupils are equal, round, and reactive to light.   Neck:      Thyroid: No thyromegaly.      Vascular: No JVR. JVD normal.      Lymphadenopathy: No cervical adenopathy.   Pulmonary:      Effort: Pulmonary effort is normal.      Breath sounds: Normal breath sounds. No wheezing. No rhonchi. No rales.   Chest:      Chest wall: Not tender to palpatation.   Cardiovascular:      PMI at left midclavicular line. Normal rate. Regular rhythm. Normal S1. Normal S2.      Murmurs: There is no murmur.      No gallop. No click. No rub.   Pulses:     Intact distal pulses.   Edema:     Peripheral edema absent.   Abdominal:      General: Bowel sounds are normal.      Palpations: Abdomen is soft.      Tenderness: There is no abdominal tenderness.   Musculoskeletal: Normal range of motion.         General: No tenderness.      Extremities: No clubbing present.  Skin:     General: Skin is warm and dry. There is no cyanosis.   Neurological:      General: No focal deficit present.      Mental  "Status: Alert and oriented to person, place and time.      Cranial Nerves: Cranial nerves are intact.      Motor: Motor function is intact.       Blood pressure 110/54, pulse 62, height 157.5 cm (62\"), weight 86.6 kg (191 lb), SpO2 96 %.   Lab Review:     Assessment:       1. Coronary artery disease involving native coronary artery of native heart without angina pectoris  Stable and angina free after stenting of a thrombotically occluded second diagonal branch with ACS presentation earlier this month.    2. Essential hypertension  Significant improvement in hypertension.    3. Dyslipidemia  This is followed closely by the patient's primary care provider.  The patient indicates that she has been intolerant to both Lipitor and Crestor in the past due to severe myalgias.    Procedures    Plan:     The patient has been advised that the \"drawing\" of her fingers and toes sound suspicious for carpopedal spasm and she should make an appointment with her primary care provider to assess for underlying calcium disturbance.    The patient has been reeducated regarding the essential need to maintain uninterrupted dual antiplatelet therapy for a minimum of 12 months.  She has been educated that no one should interrupt this therapy including other physicians without input from our office.  She has been warned of the potential complication of late stent thrombosis with interruption of antiplatelet therapy and the subsequent consequences.  She expresses understanding.    The patient has been advised to use her Lasix only on an as-needed basis.  She has been given specific instructions on tracking her weight and blood pressure as to when she can take Lasix \"as needed\".  The patient has been counseled to eat foods rich in potassium such as tomatoes, cantaloupes and bananas.  We have reinforced the importance of a Mediterranean-style diet.  The patient has been referred to cardiac rehab and encouraged to participate in this program " "emphasizing the importance.  The patient has been educated that there is a survival benefit by participating in cardiac rehab after a \"heart attack\".    The patient's primary care provider will need to maintain close scrutiny of her fasting lipid profile.  Our goal LDL cholesterol is less than 70 mg/dL.  If she is unable to achieve this goal on Pravachol at 40 mg once per day, it would be reasonable to consider the addition of NEXLIZET or a PCSK9 inhibitor.    Advance Care Planning   ACP discussion was held with the patient during this visit. Patient has an advance directive in EMR which is still valid.     "

## 2020-11-02 ENCOUNTER — READMISSION MANAGEMENT (OUTPATIENT)
Dept: CALL CENTER | Facility: HOSPITAL | Age: 85
End: 2020-11-02

## 2020-11-02 ENCOUNTER — TELEPHONE (OUTPATIENT)
Dept: CARDIOLOGY | Facility: CLINIC | Age: 85
End: 2020-11-02

## 2020-11-02 ENCOUNTER — TELEPHONE (OUTPATIENT)
Dept: INTERNAL MEDICINE | Facility: CLINIC | Age: 85
End: 2020-11-02

## 2020-11-02 DIAGNOSIS — Z92.89 HISTORY OF RECENT HOSPITALIZATION: ICD-10-CM

## 2020-11-02 DIAGNOSIS — R19.7 DIARRHEA OF PRESUMED INFECTIOUS ORIGIN: Primary | ICD-10-CM

## 2020-11-02 NOTE — TELEPHONE ENCOUNTER
Since patient has recently been in hospital to evaluate diarrhea.  If she wants to try applesauce, tea this may decrease diarrhea, but my concern is her diarrhea may be related to an infectious process.

## 2020-11-02 NOTE — OUTREACH NOTE
AMI Week 3 Survey      Responses   [facility] patient discharged from?  [facility]   Does the patient have one of the following disease processes/diagnoses(primary or secondary)?  Acute MI (STEMI,NSTEMI)   Week 3 attempt successful?  No   Unsuccessful attempts  Attempt 1          Cha Nixon RN

## 2020-11-02 NOTE — TELEPHONE ENCOUNTER
PATIENT CALLED REGARDING HAVING DIARRHEA FOR  THE PAST FEW DAYS. PATIENT WOULD LIKE TO TALK TO MONTY MCKEON ABOUT WHAT SHE CAN TAKE THAT WILL BE OK WITH HER MEDICATION SHE NOW TAKES.      Tanya Elise    [phone]

## 2020-11-02 NOTE — TELEPHONE ENCOUNTER
Pt. Called to check what she could take for Bile that she is passing. She knew Dr. Franco said not to take anything with D in it. I explained to the patient what that meant.I suggested that she call her PCP for further instruction. Pt. States understanding.

## 2020-11-04 ENCOUNTER — READMISSION MANAGEMENT (OUTPATIENT)
Dept: CALL CENTER | Facility: HOSPITAL | Age: 85
End: 2020-11-04

## 2020-11-04 NOTE — OUTREACH NOTE
AMI Week 3 Survey      Responses   [facility] patient discharged from?  [facility]   Does the patient have one of the following disease processes/diagnoses(primary or secondary)?  Acute MI (STEMI,NSTEMI)   Week 3 attempt successful?  No   Unsuccessful attempts  Attempt 2          Herbert Mooney RN

## 2020-11-11 RX ORDER — ALUMINUM ZIRCONIUM OCTACHLOROHYDREX GLY 16 G/100G
GEL TOPICAL 3 TIMES DAILY
Qty: 1040 G | Refills: 2 | Status: SHIPPED | OUTPATIENT
Start: 2020-11-11 | End: 2020-11-18

## 2020-11-11 NOTE — TELEPHONE ENCOUNTER
Spoke to pt and her son Slade on [employer]. Pt is still having diarrhea, pt states that she has been eating a bland diet but still having frequent bowel movements and her stomach is becoming really sore. Pt states that there is not a really strong odor when using the bathroom. Slade is wanting to know if there is a food sensitivity test that we could do? As well as what do we recommend to help?

## 2020-11-12 ENCOUNTER — APPOINTMENT (OUTPATIENT)
Dept: CT IMAGING | Facility: HOSPITAL | Age: 85
End: 2020-11-12

## 2020-11-12 ENCOUNTER — TELEPHONE (OUTPATIENT)
Dept: INTERNAL MEDICINE | Facility: CLINIC | Age: 85
End: 2020-11-12

## 2020-11-12 ENCOUNTER — APPOINTMENT (OUTPATIENT)
Dept: GENERAL RADIOLOGY | Facility: HOSPITAL | Age: 85
End: 2020-11-12

## 2020-11-12 ENCOUNTER — HOSPITAL ENCOUNTER (OUTPATIENT)
Facility: HOSPITAL | Age: 85
Setting detail: OBSERVATION
Discharge: HOME | End: 2020-11-14
Attending: STUDENT IN AN ORGANIZED HEALTH CARE EDUCATION/TRAINING PROGRAM | Admitting: INTERNAL MEDICINE

## 2020-11-12 DIAGNOSIS — G45.9 TRANSIENT ISCHEMIC ATTACK: Primary | ICD-10-CM

## 2020-11-12 LAB
BASOPHILS # BLD AUTO: 0.05 10*3/MM3 (ref 0–0.2)
BASOPHILS NFR BLD AUTO: 0.6 % (ref 0–1.5)
DEPRECATED RDW RBC AUTO: 45.4 FL (ref 37–54)
EOSINOPHIL # BLD AUTO: 0.32 10*3/MM3 (ref 0–0.4)
EOSINOPHIL NFR BLD AUTO: 4.1 % (ref 0.3–6.2)
ERYTHROCYTE [DISTWIDTH] IN BLOOD BY AUTOMATED COUNT: 12.2 % (ref 12.3–15.4)
GLUCOSE BLDC GLUCOMTR-MCNC: 110 MG/DL (ref 70–130)
HCT VFR BLD AUTO: 29.2 % (ref 34–46.6)
HGB BLD-MCNC: 9.5 G/DL (ref 12–15.9)
IMM GRANULOCYTES # BLD AUTO: 0.02 10*3/MM3 (ref 0–0.05)
IMM GRANULOCYTES NFR BLD AUTO: 0.3 % (ref 0–0.5)
LYMPHOCYTES # BLD AUTO: 2.43 10*3/MM3 (ref 0.7–3.1)
LYMPHOCYTES NFR BLD AUTO: 30.8 % (ref 19.6–45.3)
MCH RBC QN AUTO: 32.8 PG (ref 26.6–33)
MCHC RBC AUTO-ENTMCNC: 32.5 G/DL (ref 31.5–35.7)
MCV RBC AUTO: 100.7 FL (ref 79–97)
MONOCYTES # BLD AUTO: 0.73 10*3/MM3 (ref 0.1–0.9)
MONOCYTES NFR BLD AUTO: 9.3 % (ref 5–12)
NEUTROPHILS NFR BLD AUTO: 4.33 10*3/MM3 (ref 1.7–7)
NEUTROPHILS NFR BLD AUTO: 54.9 % (ref 42.7–76)
NRBC BLD AUTO-RTO: 0 /100 WBC (ref 0–0.2)
PLATELET # BLD AUTO: 197 10*3/MM3 (ref 140–450)
PMV BLD AUTO: 11.6 FL (ref 6–12)
RBC # BLD AUTO: 2.9 10*6/MM3 (ref 3.77–5.28)
WBC # BLD AUTO: 7.88 10*3/MM3 (ref 3.4–10.8)

## 2020-11-12 PROCEDURE — 82962 GLUCOSE BLOOD TEST: CPT

## 2020-11-12 PROCEDURE — 71045 X-RAY EXAM CHEST 1 VIEW: CPT

## 2020-11-12 PROCEDURE — 99284 EMERGENCY DEPT VISIT MOD MDM: CPT

## 2020-11-12 PROCEDURE — 93005 ELECTROCARDIOGRAM TRACING: CPT | Performed by: STUDENT IN AN ORGANIZED HEALTH CARE EDUCATION/TRAINING PROGRAM

## 2020-11-12 PROCEDURE — 70450 CT HEAD/BRAIN W/O DYE: CPT

## 2020-11-12 PROCEDURE — 85025 COMPLETE CBC W/AUTO DIFF WBC: CPT | Performed by: STUDENT IN AN ORGANIZED HEALTH CARE EDUCATION/TRAINING PROGRAM

## 2020-11-12 PROCEDURE — 80053 COMPREHEN METABOLIC PANEL: CPT | Performed by: STUDENT IN AN ORGANIZED HEALTH CARE EDUCATION/TRAINING PROGRAM

## 2020-11-12 PROCEDURE — 84484 ASSAY OF TROPONIN QUANT: CPT | Performed by: FAMILY MEDICINE

## 2020-11-12 PROCEDURE — 80061 LIPID PANEL: CPT | Performed by: FAMILY MEDICINE

## 2020-11-12 PROCEDURE — 83735 ASSAY OF MAGNESIUM: CPT | Performed by: FAMILY MEDICINE

## 2020-11-12 PROCEDURE — 85610 PROTHROMBIN TIME: CPT | Performed by: STUDENT IN AN ORGANIZED HEALTH CARE EDUCATION/TRAINING PROGRAM

## 2020-11-12 RX ORDER — SODIUM CHLORIDE 0.9 % (FLUSH) 0.9 %
10 SYRINGE (ML) INJECTION AS NEEDED
Status: DISCONTINUED | OUTPATIENT
Start: 2020-11-12 | End: 2020-11-14 | Stop reason: HOSPADM

## 2020-11-12 NOTE — TELEPHONE ENCOUNTER
Patient's son Slade requested a call back. Patient took her morning medications of:  aspirin 325 MG EC tablet,  clopidogrel (PLAVIX) 75 MG tablet,  furosemide (LASIX) 40 MG tablet,  lisinopril (PRINIVIL,ZESTRIL) 20 MG tablet, and baclofen (LIORESAL) 10 MG tablet.  Within a few minutes of taking these medications the patient vomited. Patient would like to know if she needs to retake her medications.    Please call and advise. Slade's call back [phone]

## 2020-11-12 NOTE — TELEPHONE ENCOUNTER
PHARMACY TELEPHONED REQUESTING A REFILL FOR AN AGGRENOX 25-200MG TWICE A DAY FOR PATIENT. MEDICATION IS NOT LOCATED IN PATIENT CHART PATIENT PHARMACY VERIFIED [id number] - [place], [place] - [address] [facility] [address] [employer] [address] [employer] [facility] - [phone]  - [phone] FX

## 2020-11-13 ENCOUNTER — APPOINTMENT (OUTPATIENT)
Dept: CARDIOLOGY | Facility: HOSPITAL | Age: 85
End: 2020-11-13

## 2020-11-13 ENCOUNTER — APPOINTMENT (OUTPATIENT)
Dept: MRI IMAGING | Facility: HOSPITAL | Age: 85
End: 2020-11-13

## 2020-11-13 ENCOUNTER — APPOINTMENT (OUTPATIENT)
Dept: ULTRASOUND IMAGING | Facility: HOSPITAL | Age: 85
End: 2020-11-13

## 2020-11-13 ENCOUNTER — APPOINTMENT (OUTPATIENT)
Dept: CT IMAGING | Facility: HOSPITAL | Age: 85
End: 2020-11-13

## 2020-11-13 PROBLEM — N18.9 ACUTE KIDNEY INJURY SUPERIMPOSED ON CHRONIC KIDNEY DISEASE: Status: ACTIVE | Noted: 2020-11-13

## 2020-11-13 PROBLEM — G45.9 TRANSIENT ISCHEMIC ATTACK: Status: ACTIVE | Noted: 2020-11-13

## 2020-11-13 PROBLEM — R10.32 LEFT LOWER QUADRANT ABDOMINAL PAIN: Status: ACTIVE | Noted: 2020-11-13

## 2020-11-13 PROBLEM — N17.9 ACUTE KIDNEY INJURY SUPERIMPOSED ON CHRONIC KIDNEY DISEASE: Status: ACTIVE | Noted: 2020-11-13

## 2020-11-13 LAB
ALBUMIN SERPL-MCNC: 3.9 G/DL (ref 3.5–5.2)
ALBUMIN/GLOB SERPL: 1.1 G/DL
ALP SERPL-CCNC: 55 U/L (ref 39–117)
ALT SERPL W P-5'-P-CCNC: 11 U/L (ref 1–33)
ANION GAP SERPL CALCULATED.3IONS-SCNC: 11.6 MMOL/L (ref 5–15)
ANION GAP SERPL CALCULATED.3IONS-SCNC: 14 MMOL/L (ref 5–15)
AST SERPL-CCNC: 16 U/L (ref 1–32)
BASOPHILS # BLD AUTO: 0.04 10*3/MM3 (ref 0–0.2)
BASOPHILS NFR BLD AUTO: 0.5 % (ref 0–1.5)
BH CV ECHO MEAS - IVSD: 1 CM
BH CV ECHO MEAS - LVPWD: 1 CM
BH CV ECHO MEAS - TAPSE (>1.6): 2.3 CM
BILIRUB SERPL-MCNC: 0.3 MG/DL (ref 0–1.2)
BUN SERPL-MCNC: 67 MG/DL (ref 8–23)
BUN SERPL-MCNC: 71 MG/DL (ref 8–23)
BUN/CREAT SERPL: 30.7 (ref 7–25)
BUN/CREAT SERPL: 31.3 (ref 7–25)
CALCIUM SPEC-SCNC: 9.1 MG/DL (ref 8.6–10.5)
CALCIUM SPEC-SCNC: 9.3 MG/DL (ref 8.6–10.5)
CHLORIDE SERPL-SCNC: 104 MMOL/L (ref 98–107)
CHLORIDE SERPL-SCNC: 108 MMOL/L (ref 98–107)
CHOLEST SERPL-MCNC: 170 MG/DL (ref 0–200)
CO2 SERPL-SCNC: 18 MMOL/L (ref 22–29)
CO2 SERPL-SCNC: 19.4 MMOL/L (ref 22–29)
CREAT SERPL-MCNC: 2.14 MG/DL (ref 0.57–1)
CREAT SERPL-MCNC: 2.31 MG/DL (ref 0.57–1)
DEPRECATED RDW RBC AUTO: 44 FL (ref 37–54)
EOSINOPHIL # BLD AUTO: 0.27 10*3/MM3 (ref 0–0.4)
EOSINOPHIL NFR BLD AUTO: 3.6 % (ref 0.3–6.2)
ERYTHROCYTE [DISTWIDTH] IN BLOOD BY AUTOMATED COUNT: 12.2 % (ref 12.3–15.4)
GFR SERPL CREATININE-BSD FRML MDRD: 20 ML/MIN/1.73
GFR SERPL CREATININE-BSD FRML MDRD: 22 ML/MIN/1.73
GLOBULIN UR ELPH-MCNC: 3.6 GM/DL
GLUCOSE BLDC GLUCOMTR-MCNC: 105 MG/DL (ref 70–130)
GLUCOSE BLDC GLUCOMTR-MCNC: 127 MG/DL (ref 70–130)
GLUCOSE BLDC GLUCOMTR-MCNC: 168 MG/DL (ref 70–130)
GLUCOSE SERPL-MCNC: 102 MG/DL (ref 65–99)
GLUCOSE SERPL-MCNC: 114 MG/DL (ref 65–99)
HBA1C MFR BLD: 5.7 % (ref 4.8–5.6)
HCT VFR BLD AUTO: 26 % (ref 34–46.6)
HDLC SERPL-MCNC: 49 MG/DL (ref 40–60)
HGB BLD-MCNC: 8.4 G/DL (ref 12–15.9)
HOLD SPECIMEN: NORMAL
HOLD SPECIMEN: NORMAL
IMM GRANULOCYTES # BLD AUTO: 0.03 10*3/MM3 (ref 0–0.05)
IMM GRANULOCYTES NFR BLD AUTO: 0.4 % (ref 0–0.5)
INR PPP: 1.12 (ref 0.9–1.1)
LDLC SERPL CALC-MCNC: 100 MG/DL (ref 0–100)
LDLC/HDLC SERPL: 2 {RATIO}
LEFT ATRIUM VOLUME INDEX: 31 ML/M2
LV EF 2D ECHO EST: 65 %
LYMPHOCYTES # BLD AUTO: 1.98 10*3/MM3 (ref 0.7–3.1)
LYMPHOCYTES NFR BLD AUTO: 26.8 % (ref 19.6–45.3)
MAGNESIUM SERPL-MCNC: 2.3 MG/DL (ref 1.6–2.4)
MAXIMAL PREDICTED HEART RATE: 133 BPM
MCH RBC QN AUTO: 31.9 PG (ref 26.6–33)
MCHC RBC AUTO-ENTMCNC: 32.3 G/DL (ref 31.5–35.7)
MCV RBC AUTO: 98.9 FL (ref 79–97)
MONOCYTES # BLD AUTO: 0.49 10*3/MM3 (ref 0.1–0.9)
MONOCYTES NFR BLD AUTO: 6.6 % (ref 5–12)
NEUTROPHILS NFR BLD AUTO: 4.59 10*3/MM3 (ref 1.7–7)
NEUTROPHILS NFR BLD AUTO: 62.1 % (ref 42.7–76)
NRBC BLD AUTO-RTO: 0 /100 WBC (ref 0–0.2)
PLATELET # BLD AUTO: 190 10*3/MM3 (ref 140–450)
PMV BLD AUTO: 12.4 FL (ref 6–12)
POTASSIUM SERPL-SCNC: 4.9 MMOL/L (ref 3.5–5.2)
POTASSIUM SERPL-SCNC: 5 MMOL/L (ref 3.5–5.2)
PROT SERPL-MCNC: 7.5 G/DL (ref 6–8.5)
PROTHROMBIN TIME: 14.9 SECONDS (ref 12–15.1)
RBC # BLD AUTO: 2.63 10*6/MM3 (ref 3.77–5.28)
SARS-COV-2 RNA PNL SPEC NAA+PROBE: NOT DETECTED
SODIUM SERPL-SCNC: 136 MMOL/L (ref 136–145)
SODIUM SERPL-SCNC: 139 MMOL/L (ref 136–145)
STRESS TARGET HR: 113 BPM
TRIGL SERPL-MCNC: 115 MG/DL (ref 0–150)
TROPONIN T SERPL-MCNC: <0.01 NG/ML (ref 0–0.03)
VLDLC SERPL-MCNC: 21 MG/DL (ref 5–40)
WBC # BLD AUTO: 7.4 10*3/MM3 (ref 3.4–10.8)
WHOLE BLOOD HOLD SPECIMEN: NORMAL
WHOLE BLOOD HOLD SPECIMEN: NORMAL

## 2020-11-13 PROCEDURE — 96360 HYDRATION IV INFUSION INIT: CPT

## 2020-11-13 PROCEDURE — 82962 GLUCOSE BLOOD TEST: CPT

## 2020-11-13 PROCEDURE — 93880 EXTRACRANIAL BILAT STUDY: CPT

## 2020-11-13 PROCEDURE — 97165 OT EVAL LOW COMPLEX 30 MIN: CPT

## 2020-11-13 PROCEDURE — G0378 HOSPITAL OBSERVATION PER HR: HCPCS

## 2020-11-13 PROCEDURE — 74176 CT ABD & PELVIS W/O CONTRAST: CPT

## 2020-11-13 PROCEDURE — 87635 SARS-COV-2 COVID-19 AMP PRB: CPT | Performed by: FAMILY MEDICINE

## 2020-11-13 PROCEDURE — 99204 OFFICE O/P NEW MOD 45 MIN: CPT | Performed by: STUDENT IN AN ORGANIZED HEALTH CARE EDUCATION/TRAINING PROGRAM

## 2020-11-13 PROCEDURE — 70544 MR ANGIOGRAPHY HEAD W/O DYE: CPT

## 2020-11-13 PROCEDURE — 83036 HEMOGLOBIN GLYCOSYLATED A1C: CPT | Performed by: FAMILY MEDICINE

## 2020-11-13 PROCEDURE — 92610 EVALUATE SWALLOWING FUNCTION: CPT

## 2020-11-13 PROCEDURE — 80048 BASIC METABOLIC PNL TOTAL CA: CPT | Performed by: FAMILY MEDICINE

## 2020-11-13 PROCEDURE — 93306 TTE W/DOPPLER COMPLETE: CPT

## 2020-11-13 PROCEDURE — 70551 MRI BRAIN STEM W/O DYE: CPT

## 2020-11-13 PROCEDURE — 96361 HYDRATE IV INFUSION ADD-ON: CPT

## 2020-11-13 PROCEDURE — 85025 COMPLETE CBC W/AUTO DIFF WBC: CPT | Performed by: FAMILY MEDICINE

## 2020-11-13 PROCEDURE — 70547 MR ANGIOGRAPHY NECK W/O DYE: CPT

## 2020-11-13 PROCEDURE — C9803 HOPD COVID-19 SPEC COLLECT: HCPCS

## 2020-11-13 PROCEDURE — 97161 PT EVAL LOW COMPLEX 20 MIN: CPT

## 2020-11-13 PROCEDURE — 93306 TTE W/DOPPLER COMPLETE: CPT | Performed by: INTERNAL MEDICINE

## 2020-11-13 PROCEDURE — 99220 PR INITIAL OBSERVATION CARE/DAY 70 MINUTES: CPT | Performed by: FAMILY MEDICINE

## 2020-11-13 PROCEDURE — 25010000002 LEVOFLOXACIN PER 250 MG: Performed by: FAMILY MEDICINE

## 2020-11-13 RX ORDER — SODIUM CHLORIDE 0.9 % (FLUSH) 0.9 %
10 SYRINGE (ML) INJECTION AS NEEDED
Status: DISCONTINUED | OUTPATIENT
Start: 2020-11-13 | End: 2020-11-14 | Stop reason: HOSPADM

## 2020-11-13 RX ORDER — CALCIUM CARBONATE 200(500)MG
2 TABLET,CHEWABLE ORAL 3 TIMES DAILY PRN
Status: DISCONTINUED | OUTPATIENT
Start: 2020-11-13 | End: 2020-11-14 | Stop reason: HOSPADM

## 2020-11-13 RX ORDER — CARVEDILOL 3.12 MG/1
3.12 TABLET ORAL 2 TIMES DAILY WITH MEALS
Status: DISCONTINUED | OUTPATIENT
Start: 2020-11-13 | End: 2020-11-14 | Stop reason: HOSPADM

## 2020-11-13 RX ORDER — ACETAMINOPHEN 650 MG/1
650 SUPPOSITORY RECTAL EVERY 4 HOURS PRN
Status: DISCONTINUED | OUTPATIENT
Start: 2020-11-13 | End: 2020-11-14 | Stop reason: HOSPADM

## 2020-11-13 RX ORDER — ACETAMINOPHEN 160 MG/5ML
650 SOLUTION ORAL EVERY 4 HOURS PRN
Status: DISCONTINUED | OUTPATIENT
Start: 2020-11-13 | End: 2020-11-14 | Stop reason: HOSPADM

## 2020-11-13 RX ORDER — CHOLECALCIFEROL (VITAMIN D3) 125 MCG
5 CAPSULE ORAL NIGHTLY PRN
Status: DISCONTINUED | OUTPATIENT
Start: 2020-11-13 | End: 2020-11-14 | Stop reason: HOSPADM

## 2020-11-13 RX ORDER — ONDANSETRON 4 MG/1
4 TABLET, FILM COATED ORAL EVERY 6 HOURS PRN
Status: DISCONTINUED | OUTPATIENT
Start: 2020-11-13 | End: 2020-11-14 | Stop reason: HOSPADM

## 2020-11-13 RX ORDER — ASPIRIN 325 MG
325 TABLET, DELAYED RELEASE (ENTERIC COATED) ORAL DAILY
Status: DISCONTINUED | OUTPATIENT
Start: 2020-11-13 | End: 2020-11-13

## 2020-11-13 RX ORDER — ONDANSETRON 2 MG/ML
4 INJECTION INTRAMUSCULAR; INTRAVENOUS EVERY 6 HOURS PRN
Status: DISCONTINUED | OUTPATIENT
Start: 2020-11-13 | End: 2020-11-14 | Stop reason: HOSPADM

## 2020-11-13 RX ORDER — PANTOPRAZOLE SODIUM 40 MG/1
40 TABLET, DELAYED RELEASE ORAL
Status: DISCONTINUED | OUTPATIENT
Start: 2020-11-13 | End: 2020-11-14 | Stop reason: HOSPADM

## 2020-11-13 RX ORDER — ACETAMINOPHEN 325 MG/1
650 TABLET ORAL EVERY 4 HOURS PRN
Status: DISCONTINUED | OUTPATIENT
Start: 2020-11-13 | End: 2020-11-14 | Stop reason: HOSPADM

## 2020-11-13 RX ORDER — SODIUM CHLORIDE 0.9 % (FLUSH) 0.9 %
10 SYRINGE (ML) INJECTION EVERY 12 HOURS SCHEDULED
Status: DISCONTINUED | OUTPATIENT
Start: 2020-11-13 | End: 2020-11-14 | Stop reason: HOSPADM

## 2020-11-13 RX ORDER — CLOPIDOGREL BISULFATE 75 MG/1
75 TABLET ORAL DAILY
Status: DISCONTINUED | OUTPATIENT
Start: 2020-11-13 | End: 2020-11-14 | Stop reason: HOSPADM

## 2020-11-13 RX ORDER — BISACODYL 10 MG
10 SUPPOSITORY, RECTAL RECTAL DAILY PRN
Status: DISCONTINUED | OUTPATIENT
Start: 2020-11-13 | End: 2020-11-14 | Stop reason: HOSPADM

## 2020-11-13 RX ORDER — PRAVASTATIN SODIUM 20 MG
40 TABLET ORAL NIGHTLY
Status: DISCONTINUED | OUTPATIENT
Start: 2020-11-13 | End: 2020-11-14 | Stop reason: HOSPADM

## 2020-11-13 RX ORDER — LISINOPRIL 20 MG/1
20 TABLET ORAL DAILY
Status: DISCONTINUED | OUTPATIENT
Start: 2020-11-13 | End: 2020-11-13

## 2020-11-13 RX ORDER — FUROSEMIDE 40 MG/1
40 TABLET ORAL DAILY
Status: DISCONTINUED | OUTPATIENT
Start: 2020-11-13 | End: 2020-11-13

## 2020-11-13 RX ORDER — MORPHINE SULFATE 2 MG/ML
2 INJECTION, SOLUTION INTRAMUSCULAR; INTRAVENOUS EVERY 4 HOURS PRN
Status: DISCONTINUED | OUTPATIENT
Start: 2020-11-13 | End: 2020-11-14 | Stop reason: HOSPADM

## 2020-11-13 RX ORDER — ACETAMINOPHEN 650 MG/1
650 SUPPOSITORY RECTAL EVERY 4 HOURS PRN
Status: DISCONTINUED | OUTPATIENT
Start: 2020-11-13 | End: 2020-11-13 | Stop reason: ALTCHOICE

## 2020-11-13 RX ORDER — OMEGA-3S/DHA/EPA/FISH OIL/D3 300MG-1000
400 CAPSULE ORAL DAILY
Status: DISCONTINUED | OUTPATIENT
Start: 2020-11-13 | End: 2020-11-14 | Stop reason: HOSPADM

## 2020-11-13 RX ORDER — SODIUM CHLORIDE 9 MG/ML
125 INJECTION, SOLUTION INTRAVENOUS CONTINUOUS
Status: ACTIVE | OUTPATIENT
Start: 2020-11-13 | End: 2020-11-14

## 2020-11-13 RX ORDER — ASPIRIN 81 MG/1
324 TABLET, CHEWABLE ORAL ONCE
Status: COMPLETED | OUTPATIENT
Start: 2020-11-13 | End: 2020-11-13

## 2020-11-13 RX ORDER — LEVOFLOXACIN 5 MG/ML
250 INJECTION, SOLUTION INTRAVENOUS EVERY 24 HOURS
Status: DISCONTINUED | OUTPATIENT
Start: 2020-11-13 | End: 2020-11-14

## 2020-11-13 RX ORDER — HYDROCODONE BITARTRATE AND ACETAMINOPHEN 5; 325 MG/1; MG/1
1 TABLET ORAL EVERY 6 HOURS PRN
Status: DISCONTINUED | OUTPATIENT
Start: 2020-11-13 | End: 2020-11-14 | Stop reason: HOSPADM

## 2020-11-13 RX ORDER — ASPIRIN 81 MG/1
324 TABLET, CHEWABLE ORAL ONCE
Status: DISCONTINUED | OUTPATIENT
Start: 2020-11-13 | End: 2020-11-13

## 2020-11-13 RX ORDER — BACLOFEN 10 MG/1
5 TABLET ORAL 2 TIMES DAILY PRN
Status: DISCONTINUED | OUTPATIENT
Start: 2020-11-13 | End: 2020-11-14 | Stop reason: HOSPADM

## 2020-11-13 RX ADMIN — ASPIRIN 325 MG: 325 TABLET, DELAYED RELEASE ORAL at 09:25

## 2020-11-13 RX ADMIN — BACLOFEN 5 MG: 10 TABLET ORAL at 21:38

## 2020-11-13 RX ADMIN — PANTOPRAZOLE SODIUM 40 MG: 40 TABLET, DELAYED RELEASE ORAL at 06:31

## 2020-11-13 RX ADMIN — SODIUM CHLORIDE, PRESERVATIVE FREE 10 ML: 5 INJECTION INTRAVENOUS at 09:26

## 2020-11-13 RX ADMIN — SODIUM CHLORIDE, PRESERVATIVE FREE 10 ML: 5 INJECTION INTRAVENOUS at 21:45

## 2020-11-13 RX ADMIN — SODIUM CHLORIDE, PRESERVATIVE FREE 10 ML: 5 INJECTION INTRAVENOUS at 01:00

## 2020-11-13 RX ADMIN — Medication 10 ML: at 21:45

## 2020-11-13 RX ADMIN — SODIUM CHLORIDE 500 ML: 9 INJECTION, SOLUTION INTRAVENOUS at 04:00

## 2020-11-13 RX ADMIN — SODIUM CHLORIDE 125 ML/HR: 9 INJECTION, SOLUTION INTRAVENOUS at 06:40

## 2020-11-13 RX ADMIN — APIXABAN 2.5 MG: 2.5 TABLET, FILM COATED ORAL at 21:38

## 2020-11-13 RX ADMIN — HYDROCODONE BITARTRATE AND ACETAMINOPHEN 1 TABLET: 5; 325 TABLET ORAL at 21:37

## 2020-11-13 RX ADMIN — LEVOFLOXACIN 250 MG: 5 INJECTION, SOLUTION INTRAVENOUS at 06:31

## 2020-11-13 RX ADMIN — METRONIDAZOLE 500 MG: 500 INJECTION, SOLUTION INTRAVENOUS at 10:42

## 2020-11-13 RX ADMIN — PRAVASTATIN SODIUM 40 MG: 20 TABLET ORAL at 21:38

## 2020-11-13 RX ADMIN — CARVEDILOL 3.12 MG: 3.12 TABLET, FILM COATED ORAL at 17:09

## 2020-11-13 RX ADMIN — METRONIDAZOLE 500 MG: 500 INJECTION, SOLUTION INTRAVENOUS at 16:17

## 2020-11-13 RX ADMIN — HYDROCODONE BITARTRATE AND ACETAMINOPHEN 1 TABLET: 5; 325 TABLET ORAL at 10:35

## 2020-11-13 RX ADMIN — METRONIDAZOLE 500 MG: 500 INJECTION, SOLUTION INTRAVENOUS at 22:50

## 2020-11-13 RX ADMIN — SODIUM CHLORIDE 500 ML: 9 INJECTION, SOLUTION INTRAVENOUS at 01:51

## 2020-11-13 RX ADMIN — CHOLECALCIFEROL TAB 10 MCG (400 UNIT) 400 UNITS: 10 TAB at 09:19

## 2020-11-13 RX ADMIN — CARVEDILOL 3.12 MG: 3.12 TABLET, FILM COATED ORAL at 09:19

## 2020-11-13 RX ADMIN — SODIUM CHLORIDE 125 ML/HR: 9 INJECTION, SOLUTION INTRAVENOUS at 21:42

## 2020-11-13 RX ADMIN — ASPIRIN 81 MG CHEWABLE TABLET 324 MG: 81 TABLET CHEWABLE at 01:51

## 2020-11-13 RX ADMIN — CLOPIDOGREL BISULFATE 75 MG: 75 TABLET ORAL at 09:19

## 2020-11-13 NOTE — TELEPHONE ENCOUNTER
PATIENT'S SON, GERBER, CALLED AND WANTED TO LET YOU KNOW THAT PATIENT HAS BEEN ADMITTED TO [facility] FOR A TIA.  HE'S WONDERING IF HER MISSING HER MEDICATION COULD HAVE PRECIPITATED THAT EVENT.      HE WILL GIVE US A CALL BACK LATER TO UPDATE US.    CALLBACK:  [phone]

## 2020-11-13 NOTE — TELEPHONE ENCOUNTER
Called and talked to Slade, patient's son who states Tanya did not take her medication after throwing it up yesterday morning.  She was admitted to [facility] ED with TIA symptoms last night. Has been admitted to hospital, neurology has been consulted.  He was home trying to rest.  Advised him phone message from yesterday morning was not seen until today, as I was out of the office yesterday.

## 2020-11-14 ENCOUNTER — READMISSION MANAGEMENT (OUTPATIENT)
Dept: CALL CENTER | Facility: HOSPITAL | Age: 85
End: 2020-11-14

## 2020-11-14 VITALS
SYSTOLIC BLOOD PRESSURE: 121 MMHG | OXYGEN SATURATION: 97 % | WEIGHT: 193.34 LBS | BODY MASS INDEX: 35.58 KG/M2 | HEART RATE: 60 BPM | HEIGHT: 62 IN | TEMPERATURE: 97.8 F | RESPIRATION RATE: 16 BRPM | DIASTOLIC BLOOD PRESSURE: 92 MMHG

## 2020-11-14 LAB
ANION GAP SERPL CALCULATED.3IONS-SCNC: 8.6 MMOL/L (ref 5–15)
BUN SERPL-MCNC: 48 MG/DL (ref 8–23)
BUN/CREAT SERPL: 27.4 (ref 7–25)
CALCIUM SPEC-SCNC: 8.3 MG/DL (ref 8.6–10.5)
CHLORIDE SERPL-SCNC: 114 MMOL/L (ref 98–107)
CO2 SERPL-SCNC: 19.4 MMOL/L (ref 22–29)
CREAT SERPL-MCNC: 1.75 MG/DL (ref 0.57–1)
DEPRECATED RDW RBC AUTO: 44.7 FL (ref 37–54)
ERYTHROCYTE [DISTWIDTH] IN BLOOD BY AUTOMATED COUNT: 12.4 % (ref 12.3–15.4)
GFR SERPL CREATININE-BSD FRML MDRD: 27 ML/MIN/1.73
GLUCOSE BLDC GLUCOMTR-MCNC: 119 MG/DL (ref 70–130)
GLUCOSE SERPL-MCNC: 98 MG/DL (ref 65–99)
HCT VFR BLD AUTO: 24.8 % (ref 34–46.6)
HGB BLD-MCNC: 8.1 G/DL (ref 12–15.9)
MCH RBC QN AUTO: 32.4 PG (ref 26.6–33)
MCHC RBC AUTO-ENTMCNC: 32.7 G/DL (ref 31.5–35.7)
MCV RBC AUTO: 99.2 FL (ref 79–97)
PLATELET # BLD AUTO: 180 10*3/MM3 (ref 140–450)
PMV BLD AUTO: 12.1 FL (ref 6–12)
POTASSIUM SERPL-SCNC: 4.9 MMOL/L (ref 3.5–5.2)
RBC # BLD AUTO: 2.5 10*6/MM3 (ref 3.77–5.28)
SODIUM SERPL-SCNC: 142 MMOL/L (ref 136–145)
WBC # BLD AUTO: 5.21 10*3/MM3 (ref 3.4–10.8)

## 2020-11-14 PROCEDURE — 99217 PR OBSERVATION CARE DISCHARGE MANAGEMENT: CPT | Performed by: INTERNAL MEDICINE

## 2020-11-14 PROCEDURE — G0378 HOSPITAL OBSERVATION PER HR: HCPCS

## 2020-11-14 PROCEDURE — 82962 GLUCOSE BLOOD TEST: CPT

## 2020-11-14 PROCEDURE — 63710000001 ONDANSETRON PER 8 MG: Performed by: FAMILY MEDICINE

## 2020-11-14 PROCEDURE — 25010000002 LEVOFLOXACIN PER 250 MG: Performed by: FAMILY MEDICINE

## 2020-11-14 PROCEDURE — 80048 BASIC METABOLIC PNL TOTAL CA: CPT | Performed by: INTERNAL MEDICINE

## 2020-11-14 PROCEDURE — 85027 COMPLETE CBC AUTOMATED: CPT | Performed by: INTERNAL MEDICINE

## 2020-11-14 RX ORDER — METRONIDAZOLE 500 MG/1
500 TABLET ORAL EVERY 8 HOURS SCHEDULED
Qty: 30 TABLET | Refills: 0 | Status: SHIPPED | OUTPATIENT
Start: 2020-11-14 | End: 2020-11-21 | Stop reason: HOSPADM

## 2020-11-14 RX ORDER — ONDANSETRON 4 MG/1
4 TABLET, FILM COATED ORAL EVERY 6 HOURS PRN
Qty: 30 TABLET | Refills: 0 | Status: SHIPPED | OUTPATIENT
Start: 2020-11-14 | End: 2020-12-14

## 2020-11-14 RX ORDER — METRONIDAZOLE 500 MG/1
500 TABLET ORAL EVERY 8 HOURS SCHEDULED
Status: DISCONTINUED | OUTPATIENT
Start: 2020-11-14 | End: 2020-11-14 | Stop reason: HOSPADM

## 2020-11-14 RX ORDER — LEVOFLOXACIN 250 MG/1
250 TABLET ORAL EVERY 24 HOURS
Qty: 7 TABLET | Refills: 0 | Status: SHIPPED | OUTPATIENT
Start: 2020-11-14 | End: 2020-11-21 | Stop reason: HOSPADM

## 2020-11-14 RX ORDER — LEVOFLOXACIN 250 MG/1
250 TABLET ORAL EVERY 24 HOURS
Status: DISCONTINUED | OUTPATIENT
Start: 2020-11-15 | End: 2020-11-14 | Stop reason: HOSPADM

## 2020-11-14 RX ADMIN — CHOLECALCIFEROL TAB 10 MCG (400 UNIT) 400 UNITS: 10 TAB at 08:38

## 2020-11-14 RX ADMIN — APIXABAN 2.5 MG: 2.5 TABLET, FILM COATED ORAL at 08:38

## 2020-11-14 RX ADMIN — HYDROCODONE BITARTRATE AND ACETAMINOPHEN 1 TABLET: 5; 325 TABLET ORAL at 08:38

## 2020-11-14 RX ADMIN — CARVEDILOL 3.12 MG: 3.12 TABLET, FILM COATED ORAL at 07:18

## 2020-11-14 RX ADMIN — METRONIDAZOLE 500 MG: 500 TABLET ORAL at 14:46

## 2020-11-14 RX ADMIN — LEVOFLOXACIN 250 MG: 5 INJECTION, SOLUTION INTRAVENOUS at 06:02

## 2020-11-14 RX ADMIN — HYDROCODONE BITARTRATE AND ACETAMINOPHEN 1 TABLET: 5; 325 TABLET ORAL at 14:46

## 2020-11-14 RX ADMIN — CLOPIDOGREL BISULFATE 75 MG: 75 TABLET ORAL at 08:38

## 2020-11-14 RX ADMIN — METRONIDAZOLE 500 MG: 500 INJECTION, SOLUTION INTRAVENOUS at 07:18

## 2020-11-14 RX ADMIN — PANTOPRAZOLE SODIUM 40 MG: 40 TABLET, DELAYED RELEASE ORAL at 06:02

## 2020-11-14 RX ADMIN — SODIUM CHLORIDE, PRESERVATIVE FREE 10 ML: 5 INJECTION INTRAVENOUS at 08:38

## 2020-11-14 RX ADMIN — ONDANSETRON HYDROCHLORIDE 4 MG: 4 TABLET, FILM COATED ORAL at 14:46

## 2020-11-14 NOTE — OUTREACH NOTE
Prep Survey      Responses   [facility] patient discharged from?  [place]   Is LACE score < 7 ?  No   Eligibility  [facility]   Date of Admission  11/12/20   Date of Discharge  11/14/20   Discharge Disposition  Home or Self Care   Discharge diagnosis  Transient ischemic attack   Does the patient have one of the following disease processes/diagnoses(primary or secondary)?  Stroke (TIA)   Does the patient have Home health ordered?  No   Is there a DME ordered?  No   Prep survey completed?  Yes          Stefania Naqvi RN

## 2020-11-15 ENCOUNTER — TRANSITIONAL CARE MANAGEMENT TELEPHONE ENCOUNTER (OUTPATIENT)
Dept: CALL CENTER | Facility: HOSPITAL | Age: 85
End: 2020-11-15

## 2020-11-15 NOTE — OUTREACH NOTE
Call Center TCM Note      Responses   [facility] patient discharged from?  [facility]   Does the patient have one of the following disease processes/diagnoses(primary or secondary)?  Stroke (TIA)   TCM attempt successful?  No   Unsuccessful attempts  Attempt 1          Lurdes Aflord LPN    11/15/2020, 17:42 EST

## 2020-11-16 ENCOUNTER — TELEPHONE (OUTPATIENT)
Dept: CARDIOLOGY | Facility: CLINIC | Age: 85
End: 2020-11-16

## 2020-11-16 ENCOUNTER — TRANSITIONAL CARE MANAGEMENT TELEPHONE ENCOUNTER (OUTPATIENT)
Dept: CALL CENTER | Facility: HOSPITAL | Age: 85
End: 2020-11-16

## 2020-11-16 DIAGNOSIS — I21.29 ACUTE ST ELEVATION MYOCARDIAL INFARCTION (STEMI) INVOLVING OTHER CORONARY ARTERY (HCC): ICD-10-CM

## 2020-11-16 DIAGNOSIS — G45.9 TIA (TRANSIENT ISCHEMIC ATTACK): Primary | ICD-10-CM

## 2020-11-16 DIAGNOSIS — G45.9 TRANSIENT ISCHEMIC ATTACK: ICD-10-CM

## 2020-11-16 NOTE — OUTREACH NOTE
Call Center TCM Note      Responses   [facility] patient discharged from?  [facility]   Does the patient have one of the following disease processes/diagnoses(primary or secondary)?  Stroke (TIA)   TCM attempt successful?  Yes   Call start time  1554   Call end time  1558   Meds reviewed with patient/caregiver?  Yes   Is the patient having any side effects they believe may be caused by any medication additions or changes?  No   Does the patient have all medications ordered at discharge?  Yes   Is the patient taking all medications as directed (includes completed medication regime)?  Yes   Does the patient have a primary care provider?   Yes   Does the patient have an appointment with their PCP within 7 days of discharge?  Yes   Comments regarding PCP  PCP FOLLOW UP APPOINTMENT IS 11/18   Has the patient kept scheduled appointments due by today?  N/A   Has home health visited the patient within 72 hours of discharge?  N/A   Psychosocial issues?  No   Does the patient require any assistance with activities of daily living such as eating, bathing, dressing, walking, etc.?  No   Does the patient have any residual symptoms from stroke/TIA?  No   Does the patient understand the diet ordered at discharge?  Yes   Did the patient receive a copy of their discharge instructions?  Yes   Nursing interventions  Reviewed instructions with patient   What is the patient's perception of their health status since discharge?  Improving   Nursing interventions  Nurse provided patient education   Is the patient able to teach back FAST for Stroke?  Yes   Is the patient/caregiver able to teach back the risk factors for a stroke?  History of TIAs, High Cholesterol, High blood pressure-goal below 120/80   Is the patient/caregiver able to teach back signs and symptoms related to disease process for when to call PCP?  Yes   Is the patient/caregiver able to teach back signs and symptoms related to disease process for when to call 911?  Yes    If the patient is a current smoker, are they able to teach back resources for cessation?  Not a smoker   Is the patient/caregiver able to teach back the hierarchy of who to call/visit for symptoms/problems? PCP, Specialist, Home health nurse, Urgent Care, ED, 911  Yes   TCM call completed?  Yes          Lurdes Alford LPN    11/16/2020, 16:04 EST

## 2020-11-16 NOTE — TELEPHONE ENCOUNTER
Patient was in [facility] For a TIA. Son wanted to know if patient should still take aspirin 325 mg. Advise.

## 2020-11-17 LAB
ADV 40+41 DNA STL QL NAA+NON-PROBE: NOT DETECTED
ASTRO TYP 1-8 RNA STL QL NAA+NON-PROBE: NOT DETECTED
C CAYETANENSIS DNA STL QL NAA+NON-PROBE: NOT DETECTED
C COLI+JEJ+UPSA DNA STL QL NAA+NON-PROBE: NOT DETECTED
C DIF TOX TCDA+TCDB STL QL NAA+NON-PROBE: NOT DETECTED
C DIFF TOX A+B STL QL IA: NEGATIVE
CRYPTOSP DNA STL QL NAA+NON-PROBE: NOT DETECTED
E COLI O157 DNA STL QL NAA+NON-PROBE: NORMAL
E HISTOLYT DNA STL QL NAA+NON-PROBE: NOT DETECTED
EAEC PAA PLAS AGGR+AATA ST NAA+NON-PRB: NOT DETECTED
EC STX1+STX2 GENES STL QL NAA+NON-PROBE: NOT DETECTED
EPEC EAE GENE STL QL NAA+NON-PROBE: NOT DETECTED
ETEC LTA+ST1A+ST1B TOX ST NAA+NON-PROBE: NOT DETECTED
G LAMBLIA DNA STL QL NAA+NON-PROBE: NOT DETECTED
NOROVIRUS GI+II RNA STL QL NAA+NON-PROBE: NOT DETECTED
O+P SPEC MICRO: NORMAL
O+P STL CONC: NORMAL
P SHIGELLOIDES DNA STL QL NAA+NON-PROBE: NOT DETECTED
RVA RNA STL QL NAA+NON-PROBE: NOT DETECTED
S ENT+BONG DNA STL QL NAA+NON-PROBE: NOT DETECTED
SAPO I+II+IV+V RNA STL QL NAA+NON-PROBE: NOT DETECTED
SHIGELLA SP+EIEC IPAH ST NAA+NON-PROBE: NOT DETECTED
V CHOL+PARA+VUL DNA STL QL NAA+NON-PROBE: NOT DETECTED
V CHOLERAE DNA STL QL NAA+NON-PROBE: NOT DETECTED
Y ENTEROCOL DNA STL QL NAA+NON-PROBE: NOT DETECTED

## 2020-11-18 ENCOUNTER — TELEPHONE (OUTPATIENT)
Dept: INTERNAL MEDICINE | Facility: CLINIC | Age: 85
End: 2020-11-18

## 2020-11-18 ENCOUNTER — OFFICE VISIT (OUTPATIENT)
Dept: INTERNAL MEDICINE | Facility: CLINIC | Age: 85
End: 2020-11-18

## 2020-11-18 ENCOUNTER — READMISSION MANAGEMENT (OUTPATIENT)
Dept: CALL CENTER | Facility: HOSPITAL | Age: 85
End: 2020-11-18

## 2020-11-18 ENCOUNTER — APPOINTMENT (OUTPATIENT)
Dept: CT IMAGING | Facility: HOSPITAL | Age: 85
End: 2020-11-18

## 2020-11-18 ENCOUNTER — HOSPITAL ENCOUNTER (INPATIENT)
Facility: HOSPITAL | Age: 85
LOS: 3 days | Discharge: HOME HEALTH | End: 2020-11-21
Attending: EMERGENCY MEDICINE | Admitting: INTERNAL MEDICINE

## 2020-11-18 VITALS
SYSTOLIC BLOOD PRESSURE: 110 MMHG | BODY MASS INDEX: 35.36 KG/M2 | HEIGHT: 62 IN | OXYGEN SATURATION: 98 % | HEART RATE: 50 BPM | TEMPERATURE: 97.5 F | DIASTOLIC BLOOD PRESSURE: 60 MMHG

## 2020-11-18 DIAGNOSIS — I10 ESSENTIAL HYPERTENSION: ICD-10-CM

## 2020-11-18 DIAGNOSIS — R79.89 ABNORMAL CBC: ICD-10-CM

## 2020-11-18 DIAGNOSIS — N18.9 CHRONIC KIDNEY DISEASE, UNSPECIFIED CKD STAGE: ICD-10-CM

## 2020-11-18 DIAGNOSIS — H61.23 EXCESSIVE CERUMEN IN BOTH EAR CANALS: ICD-10-CM

## 2020-11-18 DIAGNOSIS — N18.31 STAGE 3A CHRONIC KIDNEY DISEASE (HCC): ICD-10-CM

## 2020-11-18 DIAGNOSIS — G45.9 TIA (TRANSIENT ISCHEMIC ATTACK): ICD-10-CM

## 2020-11-18 DIAGNOSIS — R11.2 NON-INTRACTABLE VOMITING WITH NAUSEA, UNSPECIFIED VOMITING TYPE: ICD-10-CM

## 2020-11-18 DIAGNOSIS — Z09 HOSPITAL DISCHARGE FOLLOW-UP: Primary | ICD-10-CM

## 2020-11-18 DIAGNOSIS — N17.9 ACUTE RENAL FAILURE, UNSPECIFIED ACUTE RENAL FAILURE TYPE (HCC): Primary | ICD-10-CM

## 2020-11-18 DIAGNOSIS — R42 VERTIGO: ICD-10-CM

## 2020-11-18 DIAGNOSIS — I25.10 CORONARY ARTERY DISEASE INVOLVING NATIVE CORONARY ARTERY OF NATIVE HEART WITHOUT ANGINA PECTORIS: ICD-10-CM

## 2020-11-18 DIAGNOSIS — M62.830 BACK MUSCLE SPASM: ICD-10-CM

## 2020-11-18 LAB
ALBUMIN SERPL-MCNC: 3.9 G/DL (ref 3.5–5.2)
ALBUMIN/GLOB SERPL: 1.1 G/DL
ALP SERPL-CCNC: 45 U/L (ref 39–117)
ALT SERPL W P-5'-P-CCNC: 12 U/L (ref 1–33)
ANION GAP SERPL CALCULATED.3IONS-SCNC: 13.9 MMOL/L (ref 5–15)
AST SERPL-CCNC: 21 U/L (ref 1–32)
BACTERIA UR QL AUTO: ABNORMAL /HPF
BASOPHILS # BLD AUTO: 0.02 10*3/MM3 (ref 0–0.2)
BASOPHILS NFR BLD AUTO: 0.3 % (ref 0–1.5)
BILIRUB SERPL-MCNC: 0.2 MG/DL (ref 0–1.2)
BILIRUB UR QL STRIP: NEGATIVE
BUN SERPL-MCNC: 44 MG/DL (ref 8–23)
BUN/CREAT SERPL: 12.9 (ref 7–25)
CALCIUM SPEC-SCNC: 9.3 MG/DL (ref 8.6–10.5)
CHLORIDE SERPL-SCNC: 102 MMOL/L (ref 98–107)
CLARITY UR: CLEAR
CO2 SERPL-SCNC: 20.1 MMOL/L (ref 22–29)
COLOR UR: YELLOW
CREAT SERPL-MCNC: 3.41 MG/DL (ref 0.57–1)
DEPRECATED RDW RBC AUTO: 45.8 FL (ref 37–54)
EOSINOPHIL # BLD AUTO: 0.02 10*3/MM3 (ref 0–0.4)
EOSINOPHIL NFR BLD AUTO: 0.3 % (ref 0.3–6.2)
ERYTHROCYTE [DISTWIDTH] IN BLOOD BY AUTOMATED COUNT: 12.5 % (ref 12.3–15.4)
GFR SERPL CREATININE-BSD FRML MDRD: 13 ML/MIN/1.73
GFR SERPL CREATININE-BSD FRML MDRD: ABNORMAL ML/MIN/{1.73_M2}
GLOBULIN UR ELPH-MCNC: 3.5 GM/DL
GLUCOSE SERPL-MCNC: 131 MG/DL (ref 65–99)
GLUCOSE UR STRIP-MCNC: NEGATIVE MG/DL
HCT VFR BLD AUTO: 30.3 % (ref 34–46.6)
HGB BLD-MCNC: 9.9 G/DL (ref 12–15.9)
HGB UR QL STRIP.AUTO: NEGATIVE
HYALINE CASTS UR QL AUTO: ABNORMAL /LPF
IMM GRANULOCYTES # BLD AUTO: 0.03 10*3/MM3 (ref 0–0.05)
IMM GRANULOCYTES NFR BLD AUTO: 0.4 % (ref 0–0.5)
KETONES UR QL STRIP: NEGATIVE
LEUKOCYTE ESTERASE UR QL STRIP.AUTO: ABNORMAL
LIPASE SERPL-CCNC: 31 U/L (ref 13–60)
LYMPHOCYTES # BLD AUTO: 1.17 10*3/MM3 (ref 0.7–3.1)
LYMPHOCYTES NFR BLD AUTO: 15.3 % (ref 19.6–45.3)
MCH RBC QN AUTO: 32.7 PG (ref 26.6–33)
MCHC RBC AUTO-ENTMCNC: 32.7 G/DL (ref 31.5–35.7)
MCV RBC AUTO: 100 FL (ref 79–97)
MONOCYTES # BLD AUTO: 0.52 10*3/MM3 (ref 0.1–0.9)
MONOCYTES NFR BLD AUTO: 6.8 % (ref 5–12)
NEUTROPHILS NFR BLD AUTO: 5.87 10*3/MM3 (ref 1.7–7)
NEUTROPHILS NFR BLD AUTO: 76.9 % (ref 42.7–76)
NITRITE UR QL STRIP: NEGATIVE
NRBC BLD AUTO-RTO: 0 /100 WBC (ref 0–0.2)
PH UR STRIP.AUTO: <=5 [PH] (ref 5–8)
PLATELET # BLD AUTO: 229 10*3/MM3 (ref 140–450)
PMV BLD AUTO: 11.4 FL (ref 6–12)
POTASSIUM SERPL-SCNC: 4.8 MMOL/L (ref 3.5–5.2)
PROT SERPL-MCNC: 7.4 G/DL (ref 6–8.5)
PROT UR QL STRIP: NEGATIVE
RBC # BLD AUTO: 3.03 10*6/MM3 (ref 3.77–5.28)
RBC # UR: ABNORMAL /HPF
REF LAB TEST METHOD: ABNORMAL
SODIUM SERPL-SCNC: 136 MMOL/L (ref 136–145)
SP GR UR STRIP: 1.01 (ref 1–1.03)
SQUAMOUS #/AREA URNS HPF: ABNORMAL /HPF
TROPONIN T SERPL-MCNC: <0.01 NG/ML (ref 0–0.03)
UROBILINOGEN UR QL STRIP: ABNORMAL
WBC # BLD AUTO: 7.63 10*3/MM3 (ref 3.4–10.8)
WBC UR QL AUTO: ABNORMAL /HPF

## 2020-11-18 PROCEDURE — 93005 ELECTROCARDIOGRAM TRACING: CPT | Performed by: PHYSICIAN ASSISTANT

## 2020-11-18 PROCEDURE — 83690 ASSAY OF LIPASE: CPT | Performed by: PHYSICIAN ASSISTANT

## 2020-11-18 PROCEDURE — 70450 CT HEAD/BRAIN W/O DYE: CPT

## 2020-11-18 PROCEDURE — 80053 COMPREHEN METABOLIC PANEL: CPT | Performed by: PHYSICIAN ASSISTANT

## 2020-11-18 PROCEDURE — 84484 ASSAY OF TROPONIN QUANT: CPT | Performed by: PHYSICIAN ASSISTANT

## 2020-11-18 PROCEDURE — 99284 EMERGENCY DEPT VISIT MOD MDM: CPT

## 2020-11-18 PROCEDURE — 99214 OFFICE O/P EST MOD 30 MIN: CPT | Performed by: NURSE PRACTITIONER

## 2020-11-18 PROCEDURE — 99222 1ST HOSP IP/OBS MODERATE 55: CPT | Performed by: FAMILY MEDICINE

## 2020-11-18 PROCEDURE — 85025 COMPLETE CBC W/AUTO DIFF WBC: CPT | Performed by: PHYSICIAN ASSISTANT

## 2020-11-18 PROCEDURE — 25010000002 ONDANSETRON PER 1 MG: Performed by: PHYSICIAN ASSISTANT

## 2020-11-18 PROCEDURE — 81001 URINALYSIS AUTO W/SCOPE: CPT | Performed by: PHYSICIAN ASSISTANT

## 2020-11-18 RX ORDER — ONDANSETRON 4 MG/1
4 TABLET, FILM COATED ORAL EVERY 6 HOURS PRN
Status: DISCONTINUED | OUTPATIENT
Start: 2020-11-18 | End: 2020-11-21 | Stop reason: HOSPADM

## 2020-11-18 RX ORDER — L.ACID,PARA/B.BIFIDUM/S.THERM 8B CELL
1 CAPSULE ORAL 2 TIMES DAILY
Status: DISCONTINUED | OUTPATIENT
Start: 2020-11-19 | End: 2020-11-21 | Stop reason: HOSPADM

## 2020-11-18 RX ORDER — FLUTICASONE PROPIONATE 50 MCG
1 SPRAY, SUSPENSION (ML) NASAL DAILY
Qty: 18.2 ML | Refills: 0 | Status: ON HOLD | OUTPATIENT
Start: 2020-11-18 | End: 2020-11-19 | Stop reason: SDUPTHER

## 2020-11-18 RX ORDER — SODIUM CHLORIDE 0.9 % (FLUSH) 0.9 %
10 SYRINGE (ML) INJECTION AS NEEDED
Status: DISCONTINUED | OUTPATIENT
Start: 2020-11-18 | End: 2020-11-21 | Stop reason: HOSPADM

## 2020-11-18 RX ORDER — ACETAMINOPHEN 650 MG/1
650 SUPPOSITORY RECTAL EVERY 4 HOURS PRN
Status: DISCONTINUED | OUTPATIENT
Start: 2020-11-18 | End: 2020-11-21 | Stop reason: HOSPADM

## 2020-11-18 RX ORDER — BACLOFEN 10 MG/1
5 TABLET ORAL 2 TIMES DAILY PRN
Status: DISCONTINUED | OUTPATIENT
Start: 2020-11-18 | End: 2020-11-21 | Stop reason: HOSPADM

## 2020-11-18 RX ORDER — PRAVASTATIN SODIUM 20 MG
40 TABLET ORAL NIGHTLY
Status: DISCONTINUED | OUTPATIENT
Start: 2020-11-19 | End: 2020-11-21 | Stop reason: HOSPADM

## 2020-11-18 RX ORDER — CHOLECALCIFEROL (VITAMIN D3) 125 MCG
5 CAPSULE ORAL NIGHTLY PRN
Status: DISCONTINUED | OUTPATIENT
Start: 2020-11-18 | End: 2020-11-21 | Stop reason: HOSPADM

## 2020-11-18 RX ORDER — SODIUM CHLORIDE 9 MG/ML
100 INJECTION, SOLUTION INTRAVENOUS CONTINUOUS
Status: ACTIVE | OUTPATIENT
Start: 2020-11-19 | End: 2020-11-19

## 2020-11-18 RX ORDER — ACETAMINOPHEN 325 MG/1
650 TABLET ORAL EVERY 4 HOURS PRN
Status: DISCONTINUED | OUTPATIENT
Start: 2020-11-18 | End: 2020-11-21 | Stop reason: HOSPADM

## 2020-11-18 RX ORDER — SODIUM CHLORIDE 0.9 % (FLUSH) 0.9 %
10 SYRINGE (ML) INJECTION EVERY 12 HOURS SCHEDULED
Status: DISCONTINUED | OUTPATIENT
Start: 2020-11-19 | End: 2020-11-21 | Stop reason: HOSPADM

## 2020-11-18 RX ORDER — AMLODIPINE BESYLATE 5 MG/1
5 TABLET ORAL NIGHTLY
Status: DISCONTINUED | OUTPATIENT
Start: 2020-11-19 | End: 2020-11-21 | Stop reason: HOSPADM

## 2020-11-18 RX ORDER — CLOPIDOGREL BISULFATE 75 MG/1
75 TABLET ORAL DAILY
Status: DISCONTINUED | OUTPATIENT
Start: 2020-11-19 | End: 2020-11-21 | Stop reason: HOSPADM

## 2020-11-18 RX ORDER — ONDANSETRON 2 MG/ML
4 INJECTION INTRAMUSCULAR; INTRAVENOUS ONCE
Status: COMPLETED | OUTPATIENT
Start: 2020-11-18 | End: 2020-11-18

## 2020-11-18 RX ORDER — ASPIRIN 325 MG
325 TABLET, DELAYED RELEASE (ENTERIC COATED) ORAL DAILY
Status: DISCONTINUED | OUTPATIENT
Start: 2020-11-19 | End: 2020-11-20

## 2020-11-18 RX ORDER — ACETAMINOPHEN 160 MG/5ML
650 SOLUTION ORAL EVERY 4 HOURS PRN
Status: DISCONTINUED | OUTPATIENT
Start: 2020-11-18 | End: 2020-11-21 | Stop reason: HOSPADM

## 2020-11-18 RX ORDER — ONDANSETRON 2 MG/ML
4 INJECTION INTRAMUSCULAR; INTRAVENOUS EVERY 6 HOURS PRN
Status: DISCONTINUED | OUTPATIENT
Start: 2020-11-18 | End: 2020-11-21 | Stop reason: HOSPADM

## 2020-11-18 RX ORDER — CARVEDILOL 3.12 MG/1
3.12 TABLET ORAL 2 TIMES DAILY WITH MEALS
Status: DISCONTINUED | OUTPATIENT
Start: 2020-11-19 | End: 2020-11-21 | Stop reason: HOSPADM

## 2020-11-18 RX ORDER — MECLIZINE HYDROCHLORIDE 25 MG/1
25 TABLET ORAL ONCE
Status: COMPLETED | OUTPATIENT
Start: 2020-11-18 | End: 2020-11-18

## 2020-11-18 RX ADMIN — APIXABAN 2.5 MG: 2.5 TABLET, FILM COATED ORAL at 23:41

## 2020-11-18 RX ADMIN — SODIUM CHLORIDE 100 ML/HR: 9 INJECTION, SOLUTION INTRAVENOUS at 23:41

## 2020-11-18 RX ADMIN — ONDANSETRON 4 MG: 2 INJECTION INTRAMUSCULAR; INTRAVENOUS at 18:03

## 2020-11-18 RX ADMIN — Medication 1 CAPSULE: at 23:41

## 2020-11-18 RX ADMIN — AMLODIPINE BESYLATE 5 MG: 5 TABLET ORAL at 23:41

## 2020-11-18 RX ADMIN — SODIUM CHLORIDE 1000 ML: 9 INJECTION, SOLUTION INTRAVENOUS at 18:50

## 2020-11-18 RX ADMIN — CARVEDILOL 3.12 MG: 3.12 TABLET, FILM COATED ORAL at 23:41

## 2020-11-18 RX ADMIN — PRAVASTATIN SODIUM 40 MG: 20 TABLET ORAL at 23:41

## 2020-11-18 RX ADMIN — ENOXAPARIN SODIUM 30 MG: 40 INJECTION SUBCUTANEOUS at 23:41

## 2020-11-18 RX ADMIN — MECLIZINE HYDROCHLORIDE 25 MG: 25 TABLET ORAL at 18:02

## 2020-11-18 RX ADMIN — SODIUM CHLORIDE, PRESERVATIVE FREE 10 ML: 5 INJECTION INTRAVENOUS at 23:41

## 2020-11-18 NOTE — PROGRESS NOTES
Transitional Care Follow Up Visit  Subjective     Tanya Codi Elise is a 87 y.o. female who presents for a transitional care management visit.    Within 48 business hours after discharge our office contacted her via telephone to coordinate her care and needs.      I reviewed and discussed the details of that call along with the discharge summary, hospital problems, inpatient lab results, inpatient diagnostic studies, and consultation reports with Tanya.     Current outpatient and discharge medications have been reconciled for the patient.  Reviewed by: ESTEPHANIE Castillo      Date of TCM Phone Call 11/21/2020   [facility]   Date of Admission 11/18/2020   Date of Discharge 11/21/2020   Discharge Disposition Home or Self Care     Risk for Readmission (LACE) Score: 13 (11/21/2020  6:00 AM)      History of Present Illness   Course During Hospital Stay:  Patient admitted to [facility] after presenting to emergency room on 11/12/2020 with concerns regarding stroke or TIA.  Experienced right cheek, facial numbness with some facial drooping prior to arrival.  Drooping resolved, continued to have some numbness to the right cheek and right side of her mouth after admission.  No other deficits noted. Had been experiencing n/v, threw up am medications.   Neurologist, Dr Mohsen, called for evaluation in ED; recommended giving 325 mg asa as CT of head showed no acute abnormality with old chronic white matter densities (likely microvascular infarcts).  The following day several tests were completed - MRA neck, no carotid or vertebral artery stenosis. MRA head unremarkable.  MRI brain, small area of restricted diffusion suggestive of ischemia/infarct near the vertex of the right frontoparietal region & chronic findings (coriticol volume loss, white matter changes likely due to chronic microvascular ischemia).     Acute on chronic renal failure thought to be the cause of n/v/d.  CT abdomen indicated  possible diverticulitis; given levaquin and flagyl.  Flagyl to be continued PO at home.  Baseline creatinine 1.6, per nephrology (Dr Rankin).  Lasix and lisinopril held during admission.    She was evaluated by neurology the day of discharge, 11/14/2020, and discharged home on Eliquis and Plavix.  Evaluated by Speech Therapy, Physical Therapy prior to discharge.  No evidence of dysphagia, ambulating well in hallway.    Follow up appointments scheduled with nephrology, cardiology up on discharge.  Presents today with her son, she is in wheelchair.  She has questions regarding which medications she should be taking.  Has been taking Eliquis, Plavix and ASA, as well as Norvasc, Coreg, Pravachol, lisinopril, Lasix.  Taking Flagyl as prescribed.  Continues to have random n/v, although diarrhea has improved.  No associated chest pain, SOA, diaphoresis, arm/jaw/back pain, change in mental status, facial droop. Son reports she has been dizzy, occasionally.  Nausea and vomiting do not seem to be associated with particular food, time of day, stress.     The following portions of the patient's history were reviewed and updated as appropriate: allergies, current medications, past family history, past medical history, past social history, past surgical history and problem list.    Review of Systems   Constitutional: Positive for fatigue. Negative for activity change, appetite change and unexpected weight change.   HENT: Negative.    Respiratory: Negative for wheezing.    Cardiovascular: Negative for palpitations and leg swelling.   Gastrointestinal: Negative for abdominal distention, abdominal pain and blood in stool.   Musculoskeletal: Negative for myalgias.   Neurological: Negative for tremors, syncope, facial asymmetry, speech difficulty, light-headedness and headaches.   Hematological: Does not bruise/bleed easily.       Objective   Physical Exam  Constitutional:       General: She is not in acute distress.     Appearance:  She is not ill-appearing or diaphoretic.   HENT:      Head: Normocephalic.      Right Ear: Tympanic membrane and external ear normal.      Left Ear: Tympanic membrane and external ear normal.      Ears:      Comments: Moderate amount soft dark brown cerumen bilateral ears, able to sweep aside to visualize TM's  Eyes:      Extraocular Movements: Extraocular movements intact.      Conjunctiva/sclera: Conjunctivae normal.      Pupils: Pupils are equal, round, and reactive to light.   Neck:      Musculoskeletal: Normal range of motion and neck supple.   Cardiovascular:      Rate and Rhythm: Normal rate and regular rhythm.      Pulses: Normal pulses.      Heart sounds: Normal heart sounds.   Pulmonary:      Effort: Pulmonary effort is normal.      Breath sounds: Normal breath sounds.   Abdominal:      General: Bowel sounds are normal. There is no distension.      Tenderness: There is no abdominal tenderness. There is no right CVA tenderness, left CVA tenderness, guarding or rebound.   Skin:     General: Skin is warm.      Capillary Refill: Capillary refill takes less than 2 seconds.   Neurological:      General: No focal deficit present.      Mental Status: She is alert and oriented to person, place, and time.      Cranial Nerves: Cranial nerve deficit: generalized.      Motor: Weakness present.         Assessment/Plan   Diagnoses and all orders for this visit:    1. Hospital discharge follow-up (Primary)    2. Vertigo  -     Ambulatory Referral to Neurology  - Continue to monitor for triggers  - Stand and make position changes slowly    3. Excessive cerumen in both ear canals  -     carbamide peroxide (Debrox) 6.5 % otic solution; Administer 5 drops into both ears As Needed for Ear Pain.  Dispense: 15 mL; Refill: 0    4. TIA (transient ischemic attack)  -     Ambulatory Referral to Neurology  - Advised to stop taking asa, continue eliquis and plavix as prescribed  - Monitor for s/s TIA, CVA.  Patient's son verbalizes  what those s/s are.      5. Essential hypertension        -  Stop taking lisinopril and lasix.  Monitor BP at home, contact clinic or cardiology if over normal limits. Son verbalizes normal limits.     6. Stage 3a chronic kidney disease  -     Comprehensive Metabolic Panel    7. Abnormal CBC  -     CBC & Differential

## 2020-11-18 NOTE — TELEPHONE ENCOUNTER
Son came back into office and said Tanya was vomiting everywhere again, I spoke with Corrina and she suggest that pt goes back to the ER as they have more test that can be ran immediatly to see if they can find the cause of vomiting. Relayed message to son and he stated understanding

## 2020-11-18 NOTE — ED PROVIDER NOTES
"Subjective   Patient is an 87-year-old female with a history of dizziness, TIA, stroke, seasonal allergies, impaired functional mobility and gait, hypertension, diverticulitis, high cholesterol and cerumen impaction presenting to the ER for evaluation of vomiting.  Patient son is at bedside and helps with HPI.  Per chart review, patient had a STEMI on 10/13/2020.  She was admitted here on 11/12/2020 for TIA that affected her vision.  Was also noted that they found diverticulitis in her abdomen and she is still on Flagyl and Levaquin for this.  She had Zofran earlier this morning.  Son said that he took her to her primary doctor's appointment today and upon leaving she had 3 episodes of nonbloody, nonbilious emesis.  Patient states it feels as if she gets \"seasick\" when she stands up.  She denies any recent fever, chills, chest pain, shortness of breath, productive cough, melena or hematochezia, severe abdominal pain, dysuria, hematuria, or any other symptoms.          Review of Systems   Constitutional: Negative for chills and fever.   HENT: Negative for congestion, ear pain, rhinorrhea and sore throat.    Eyes: Negative.    Respiratory: Negative.    Cardiovascular: Negative.    Gastrointestinal: Positive for nausea and vomiting. Negative for abdominal pain and diarrhea.   Genitourinary: Negative.    Musculoskeletal: Negative.    Skin: Negative.    Allergic/Immunologic: Negative for immunocompromised state.   Neurological: Positive for dizziness. Negative for syncope.   Psychiatric/Behavioral: Negative.        Past Medical History:   Diagnosis Date   • Acid reflux    • Allergic    • Cataract    • Cerumen impaction    • Dizziness    • High cholesterol    • Hypertension    • Impaired functional mobility, balance, gait, and endurance    • Influenza    • Reflux gastritis    • Seasonal allergies    • Spinal headache    • Stroke (CMS/HCC)    • Transient ischemic attack    • Wears glasses        Allergies   Allergen " "Reactions   • Crestor [Rosuvastatin Calcium] Headache   • Lipitor [Atorvastatin Calcium] Myalgia   • Penicillins Rash       Past Surgical History:   Procedure Laterality Date   • APPENDECTOMY     • BREAST BIOPSY Left    • CARDIAC CATHETERIZATION N/A 10/13/2020    Procedure: Left Heart Cath;  Surgeon: Travis Franco MD;  Location: [facility] CATH INVASIVE LOCATION;  Service: Cardiovascular;  Laterality: N/A;   • CATARACT EXTRACTION Bilateral    •  SECTION      X2   • COLONOSCOPY     • DILATATION AND CURETTAGE     • EYE CAPSULOTOMY WITH LASER Right 3/20/2017    Procedure: RIGHT EYE CAPSULOTOMY WITH LASER;  Surgeon: Ev Cortez MD;  Location: [facility] OR;  Service:    • HYSTERECTOMY     • NASAL SEPTUM SURGERY     • TOOTH EXTRACTION         Family History   Problem Relation Age of Onset   • Diabetes Son        Social History     Socioeconomic History   • Marital status:      Spouse name: Not on file   • Number of children: Not on file   • Years of education: Not on file   • Highest education level: Not on file   Tobacco Use   • Smoking status: Never Smoker   • Smokeless tobacco: Never Used   Substance and Sexual Activity   • Alcohol use: No   • Drug use: No   • Sexual activity: Defer           Objective   Physical Exam  Vitals signs and nursing note reviewed.     /48 (BP Location: Right arm, Patient Position: Lying)   Pulse 64   Temp 97.8 °F (36.6 °C) (Oral)   Resp 16   Ht 157.5 cm (62\")   Wt 87.9 kg (193 lb 12.6 oz)   SpO2 97%   BMI 35.44 kg/m²     GEN: No acute distress  Head: Normocephalic, atraumatic  Eyes: EOM intact  ENT: Oral mucosa moist, tongue midline.  Chest: Nontender to palpation  Cardiovascular: Regular rate and rhythm   Lungs: Clear to auscultation bilaterally without adventitious sounds  Abdomen: Soft, nontender, nondistended, no peritoneal signs or guarding  Extremities: No edema, normal appearance, full ROM  Neuro: GCS 15  Psych: Mood and affect are " appropriate    Procedures           ED Course  ED Course as of Nov 20 0953   Wed Nov 18, 2020 1810 EKG interpreted by me.  Sinus rhythm.  Rate of 68.  No ST segment or T wave abnormalities.  Some artifact present.  Abnormal EKG    [CG]   1816 TECHNIQUE:  Routine axial images through the head were obtained without  contrast.     CLINICAL HISTORY:  Vomiting, lightheaded     FINDINGS:  The ventricles are dilated, proportionate to the degree of  generalized atrophy.  Periventricular and deep white matter  low-attenuation areas are consistent with chronic small vessel  ischemia.  There is no mass or shift in midline structures.  There is no intracranial hemorrhage.  There is no acute sinus or  osseous abnormality.     IMPRESSION:  No acute intracranial abnormality.     Authenticated by Jean Carlos Martin M.D. on 11/18/2020 06:10:48 PM    [LA]   1816 WBC: 7.63 [LA]   1816 Improved in comparison to previous   Hemoglobin(!): 9.9 [LA]   1834 Patient states she feels better after the medications.    [LA]   1835 Glucose(!): 131 [LA]   1836 BUN(!): 44 [LA]   1836 Has significantly worsened over the last few days, last creatinine was 4 days ago and was 1.75. The highest on any recent admission was 2.31   Creatinine(!): 3.41 [LA]   1836 Sodium: 136 [LA]   1836 Potassium: 4.8 [LA]   1836 Chloride: 102 [LA]   1836 CO2(!): 20.1 [LA]   1836 Calcium: 9.3 [LA]   1836 Total Protein: 7.4 [LA]   1836 Albumin: 3.90 [LA]   1836 ALT (SGPT): 12 [LA]   1836 AST (SGOT): 21 [LA]   1836 Alkaline Phosphatase: 45 [LA]   1836 Total Bilirubin: 0.2 [LA]   1836 eGFR Non  Am(!): 13 [LA]   1836 Globulin: 3.5 [LA]   1836 A/G Ratio: 1.1 [LA]   1836 BUN/Creatinine Ratio: 12.9 [LA]   1836 Anion Gap: 13.9 [LA]   1909 Discussed findings with patient and her son.  She states that she still does not feel as if she can urinate at this time, refuse catheter.  Her med list does have Lasix and she has been taking this, states that she has been trying to drink apple  juice and applesauce at home, son does not think she has had much other intake    [LA]   1946 Spoke with Dr. Ku who graciously accepted patient for admission.    [LA]   2012 RBC, UA: None Seen [LA]   2012 WBC, UA(!): 0-2 [LA]   2012 Bacteria, UA: None Seen [LA]   2012 Squamous Epithelial Cells, UA: 0-2 [LA]   2012 Hyaline Casts, UA: None Seen [LA]   2012 Methodology:: Manual Light Microscopy [LA]   2012 Color, UA: Yellow [LA]   2012 Appearance, UA: Clear [LA]   2012 pH, UA: <=5.0 [LA]   2012 Specific Gravity, UA: 1.011 [LA]   2012 Glucose: Negative [LA]   2012 Ketones, UA: Negative [LA]   2012 Bilirubin, UA: Negative [LA]   2012 Blood, UA: Negative [LA]   2012 Protein, UA: Negative [LA]   2012 Leukocytes, UA(!): Trace [LA]   2012 Nitrite, UA: Negative [LA]   2012 Urobilinogen, UA: 0.2 E.U./dL [LA]      ED Course User Index  [CG] Prabhakar Vidal,   [LA] Reyna Degroot PA-C                                           MDM  Number of Diagnoses or Management Options  Acute renal failure, unspecified acute renal failure type (CMS/HCC):   Chronic kidney disease, unspecified CKD stage:   Non-intractable vomiting with nausea, unspecified vomiting type:   Vertigo:   Diagnosis management comments: On arrival, patient is normotensive, afebrile, no acute distress, nontoxic appearance.  Differential includes dehydration, JAZMINE, electrolyte abnormalities, vertigo, ACS, cardiac dysrhythmia, and other concerns.  Will obtain basic labs, urinalysis, troponin, EKG, CT the head.    CT the head as read by the radiologist revaeled no acute abnormality.  EKG was interpreted by the attending.  Troponin was not elevated.  Patient's hemoglobin has improved in comparison to previous.  It does appear that she has got some acute renal failure and her creatinine has almost doubled since 4 days ago.  She has not been able to urinate here and refused to catheter.  She has no abdominal tenderness concerning for new or worsening intra-abdominal  infection.  She felt better after medications, has not had any additional emesis.  Given the degree of her kidney injury/failure, believe admission is warranted.  I discussed with Dr. uK who graciously accepted the patient for admission.       Amount and/or Complexity of Data Reviewed  Clinical lab tests: reviewed and ordered  Tests in the radiology section of CPT®: reviewed and ordered  Decide to obtain previous medical records or to obtain history from someone other than the patient: yes  Review and summarize past medical records: yes  Discuss the patient with other providers: yes    Risk of Complications, Morbidity, and/or Mortality  Presenting problems: moderate  Diagnostic procedures: moderate  Management options: moderate    Patient Progress  Patient progress: stable      Final diagnoses:   Chronic kidney disease, unspecified CKD stage   Acute renal failure, unspecified acute renal failure type (CMS/[facility])   Vertigo   Non-intractable vomiting with nausea, unspecified vomiting type            Reyna Degroot PA-C  11/18/20 1958       Reyna Degroot PA-C  11/20/20 0993

## 2020-11-18 NOTE — ED NOTES
STACI Campbell attempted to have pt provide a urine sample @ this time. Pt reports that she can not void @ this time, will cont. To monitor.      Saida Prater RN  11/18/20 [id number]

## 2020-11-19 PROBLEM — E66.01 MORBIDLY OBESE (HCC): Status: RESOLVED | Noted: 2019-09-17 | Resolved: 2020-11-19

## 2020-11-19 PROBLEM — G93.41 ACUTE METABOLIC ENCEPHALOPATHY: Status: ACTIVE | Noted: 2020-11-19

## 2020-11-19 LAB
ANION GAP SERPL CALCULATED.3IONS-SCNC: 9.5 MMOL/L (ref 5–15)
BASOPHILS # BLD AUTO: 0.01 10*3/MM3 (ref 0–0.2)
BASOPHILS NFR BLD AUTO: 0.2 % (ref 0–1.5)
BUN SERPL-MCNC: 40 MG/DL (ref 8–23)
BUN/CREAT SERPL: 13.9 (ref 7–25)
CALCIUM SPEC-SCNC: 8.4 MG/DL (ref 8.6–10.5)
CHLORIDE SERPL-SCNC: 113 MMOL/L (ref 98–107)
CO2 SERPL-SCNC: 19.5 MMOL/L (ref 22–29)
CREAT SERPL-MCNC: 2.88 MG/DL (ref 0.57–1)
DEPRECATED RDW RBC AUTO: 45.1 FL (ref 37–54)
EOSINOPHIL # BLD AUTO: 0.08 10*3/MM3 (ref 0–0.4)
EOSINOPHIL NFR BLD AUTO: 1.2 % (ref 0.3–6.2)
ERYTHROCYTE [DISTWIDTH] IN BLOOD BY AUTOMATED COUNT: 12.3 % (ref 12.3–15.4)
GFR SERPL CREATININE-BSD FRML MDRD: 15 ML/MIN/1.73
GLUCOSE SERPL-MCNC: 90 MG/DL (ref 65–99)
HCT VFR BLD AUTO: 25.7 % (ref 34–46.6)
HGB BLD-MCNC: 8.3 G/DL (ref 12–15.9)
IMM GRANULOCYTES # BLD AUTO: 0.04 10*3/MM3 (ref 0–0.05)
IMM GRANULOCYTES NFR BLD AUTO: 0.6 % (ref 0–0.5)
LYMPHOCYTES # BLD AUTO: 1.62 10*3/MM3 (ref 0.7–3.1)
LYMPHOCYTES NFR BLD AUTO: 24.5 % (ref 19.6–45.3)
MCH RBC QN AUTO: 32.2 PG (ref 26.6–33)
MCHC RBC AUTO-ENTMCNC: 32.3 G/DL (ref 31.5–35.7)
MCV RBC AUTO: 99.6 FL (ref 79–97)
MONOCYTES # BLD AUTO: 0.63 10*3/MM3 (ref 0.1–0.9)
MONOCYTES NFR BLD AUTO: 9.5 % (ref 5–12)
NEUTROPHILS NFR BLD AUTO: 4.23 10*3/MM3 (ref 1.7–7)
NEUTROPHILS NFR BLD AUTO: 64 % (ref 42.7–76)
NRBC BLD AUTO-RTO: 0 /100 WBC (ref 0–0.2)
PLATELET # BLD AUTO: 196 10*3/MM3 (ref 140–450)
PMV BLD AUTO: 11.8 FL (ref 6–12)
POTASSIUM SERPL-SCNC: 3.9 MMOL/L (ref 3.5–5.2)
RBC # BLD AUTO: 2.58 10*6/MM3 (ref 3.77–5.28)
SODIUM SERPL-SCNC: 142 MMOL/L (ref 136–145)
WBC # BLD AUTO: 6.61 10*3/MM3 (ref 3.4–10.8)

## 2020-11-19 PROCEDURE — 97165 OT EVAL LOW COMPLEX 30 MIN: CPT

## 2020-11-19 PROCEDURE — 99232 SBSQ HOSP IP/OBS MODERATE 35: CPT | Performed by: INTERNAL MEDICINE

## 2020-11-19 PROCEDURE — 85025 COMPLETE CBC W/AUTO DIFF WBC: CPT | Performed by: FAMILY MEDICINE

## 2020-11-19 PROCEDURE — 25010000002 ENOXAPARIN PER 10 MG: Performed by: FAMILY MEDICINE

## 2020-11-19 PROCEDURE — 97161 PT EVAL LOW COMPLEX 20 MIN: CPT

## 2020-11-19 PROCEDURE — 80048 BASIC METABOLIC PNL TOTAL CA: CPT | Performed by: FAMILY MEDICINE

## 2020-11-19 RX ADMIN — SODIUM CHLORIDE, PRESERVATIVE FREE 10 ML: 5 INJECTION INTRAVENOUS at 09:09

## 2020-11-19 RX ADMIN — PRAVASTATIN SODIUM 40 MG: 20 TABLET ORAL at 20:43

## 2020-11-19 RX ADMIN — APIXABAN 2.5 MG: 2.5 TABLET, FILM COATED ORAL at 20:43

## 2020-11-19 RX ADMIN — Medication 5 MG: at 20:43

## 2020-11-19 RX ADMIN — APIXABAN 2.5 MG: 2.5 TABLET, FILM COATED ORAL at 09:08

## 2020-11-19 RX ADMIN — AMLODIPINE BESYLATE 5 MG: 5 TABLET ORAL at 20:43

## 2020-11-19 RX ADMIN — ASPIRIN 325 MG: 325 TABLET, COATED ORAL at 09:05

## 2020-11-19 RX ADMIN — CARVEDILOL 3.12 MG: 3.12 TABLET, FILM COATED ORAL at 09:05

## 2020-11-19 RX ADMIN — SODIUM CHLORIDE 100 ML/HR: 9 INJECTION, SOLUTION INTRAVENOUS at 09:22

## 2020-11-19 RX ADMIN — CARVEDILOL 3.12 MG: 3.12 TABLET, FILM COATED ORAL at 18:09

## 2020-11-19 RX ADMIN — Medication 1 CAPSULE: at 20:43

## 2020-11-19 RX ADMIN — Medication 1 CAPSULE: at 09:05

## 2020-11-19 RX ADMIN — CLOPIDOGREL BISULFATE 75 MG: 75 TABLET ORAL at 09:06

## 2020-11-19 NOTE — THERAPY EVALUATION
Patient Name: Tanya Elise  : 1933    MRN: [id number]                              Today's Date: 2020       Admit Date: 2020    Visit Dx:     ICD-10-CM ICD-9-CM   1. Chronic kidney disease, unspecified CKD stage  N18.9 585.9   2. Acute renal failure, unspecified acute renal failure type (CMS/HCC)  N17.9 584.9   3. Vertigo  R42 780.4   4. Non-intractable vomiting with nausea, unspecified vomiting type  R11.2 787.01     Patient Active Problem List   Diagnosis   • Pure hypercholesterolemia   • Essential hypertension   • Temporary cerebral vascular dysfunction   • Esophageal reflux   • BMI 35.0-35.9,adult   • Diarrhea   • TIA (transient ischemic attack)   • Hypertensive urgency   • Chronic kidney disease, stage III (moderate)   • Acute ST elevation myocardial infarction (STEMI) (CMS/HCC)   • ST elevation myocardial infarction (STEMI) (CMS/HCC)   • Coronary artery disease involving native coronary artery of native heart without angina pectoris   • Dyslipidemia   • Transient ischemic attack   • Left lower quadrant abdominal pain   • Acute kidney injury superimposed on chronic kidney disease (CMS/HCC)   • Acute renal failure (ARF) (CMS/HCC)   • Acute metabolic encephalopathy     Past Medical History:   Diagnosis Date   • Acid reflux    • Allergic    • Cataract    • Cerumen impaction    • Dizziness    • High cholesterol    • Hypertension    • Impaired functional mobility, balance, gait, and endurance    • Influenza    • Reflux gastritis    • Seasonal allergies    • Spinal headache    • Stroke (CMS/HCC)    • Transient ischemic attack    • Wears glasses      Past Surgical History:   Procedure Laterality Date   • APPENDECTOMY     • BREAST BIOPSY Left    • CARDIAC CATHETERIZATION N/A 10/13/2020    Procedure: Left Heart Cath;  Surgeon: Travis Franco MD;  Location: [facility] INVASIVE LOC[facility];  Service: Cardiovascular;  Laterality: N/A;   • CATARACT EXTRACTION Bilateral    •  SECTION       X2   • COLONOSCOPY     • DILATATION AND CURETTAGE     • EYE CAPSULOTOMY WITH LASER Right 3/20/2017    Procedure: RIGHT EYE CAPSULOTOMY WITH LASER;  Surgeon: Ev Cortez MD;  Location: [facility];  Service:    • HYSTERECTOMY     • NASAL SEPTUM SURGERY     • TOOTH EXTRACTION       General Information     Row Name 11/19/20 1701          OT Time and Intention    Document Type  evaluation  -     Mode of Treatment  occupational therapy  -     Row Name 11/19/20 1701          General Information    Patient Profile Reviewed  yes  -     Prior Level of Function  independent:;ADL's;all household mobility  -     Existing Precautions/Restrictions  fall  -     Row Name 11/19/20 1701          Living Environment    Lives With  child(urban), adult  -     Row Name 11/19/20 1701          Home Main Entrance    Number of Stairs, Main Entrance  three  -     Stair Railings, Main Entrance  none  -     Row Name 11/19/20 1701          Cognition    Orientation Status (Cognition)  oriented x 4  -     Row Name 11/19/20 1701          Safety Issues, Functional Mobility    Safety Issues Affecting Function (Mobility)  safety precaution awareness;safety precautions follow-through/compliance  -     Impairments Affecting Function (Mobility)  strength;endurance/activity tolerance  -       User Key  (r) = Recorded By, (t) = Taken By, (c) = Cosigned By    Initials Name Provider Type     Saida Serrato Occupational Therapist        Mobility/ADL's     Row Name 11/19/20 1702          Bed Mobility    Bed Mobility  supine-sit  -     Supine-Sit [place] (Bed Mobility)  contact guard  -     Assistive Device (Bed Mobility)  bed rails;head of bed elevated  -     Row Name 11/19/20 1702          Transfers    Transfers  sit-stand transfer;toilet transfer  -     Sit-Stand [place] (Transfers)  contact guard  -     [place] Level (Toilet Transfer)  contact guard  -     Assistive Device (Toilet Transfer)  commode, bedside  without drop arms;walker, front-wheeled  -[facility] Name 11/19/20 1702          Sit-Stand Transfer    Assistive Device (Sit-Stand Transfers)  walker, front-wheeled  -AH     Row Name 11/19/20 1702          Toilet Transfer    Type (Toilet Transfer)  sit-stand;stand-sit;stand pivot/stand step  -AH     Row Name 11/19/20 1702          Functional Mobility    Functional Mobility- Ind. Level  contact guard assist  -     Functional Mobility- Device  rolling walker  -     Functional Mobility-Distance (Feet)  15  -AH     Row Name 11/19/20 1702          Activities of Daily Living    BADL Assessment/Intervention  bathing;upper body dressing;lower body dressing;grooming;feeding;toileting  -AH     Row Name 11/19/20 1702          Bathing Assessment/Intervention    [place] Level (Bathing)  minimum assist (75% patient effort)  -AH     Row Name 11/19/20 1702          Upper Body Dressing Assessment/Training    [place] Level (Upper Body Dressing)  set up  -AH     Row Name 11/19/20 1702          Lower Body Dressing Assessment/Training    [place] Level (Lower Body Dressing)  minimum assist (75% patient effort)  -AH     Row Name 11/19/20 1702          Grooming Assessment/Training    [place] Level (Grooming)  set up  -AH     Row Name 11/19/20 1702          Self-Feeding Assessment/Training    [place] Level (Feeding)  independent  -AH     Row Name 11/19/20 1702          Toileting Assessment/Training    [place] Level (Toileting)  minimum assist (75% patient effort)  -       User Key  (r) = Recorded By, (t) = Taken By, (c) = Cosigned By    Initials Name Provider Type    Saida Wylie Occupational Therapist        Obj/Interventions     [facility] Name 11/19/20 1704          Vision Assessment/Intervention    Visual Impairment/Limitations  WFL;corrective lenses full-time  -[facility] Name 11/19/20 1704          Range of Motion Comprehensive    General Range of Motion  bilateral upper extremity ROM WFL  -[facility]  Name 11/19/20 1704          Strength Comprehensive (MMT)    Comment, General Manual Muscle Testing (MMT) Assessment  BUE 4-/5  -       User Key  (r) = Recorded By, (t) = Taken By, (c) = Cosigned By    Initials Name Provider Type    Saida Wylie Occupational Therapist        Goals/Plan     Row Name 11/19/20 1706          Bed Mobility Goal 1 (OT)    Activity/Assistive Device (Bed Mobility Goal 1, OT)  supine to sit  -     [place] Level/Cues Needed (Bed Mobility Goal 1, OT)  modified independence  -AH     Time Frame (Bed Mobility Goal 1, OT)  by discharge  -     Progress/Outcomes (Bed Mobility Goal 1, OT)  goal ongoing  -     Row Name 11/19/20 1706          Transfer Goal 1 (OT)    Activity/Assistive Device (Transfer Goal 1, OT)  sit-to-stand/stand-to-sit;walker, rolling  -     [place] Level/Cues Needed (Transfer Goal 1, OT)  supervision required  -     Time Frame (Transfer Goal 1, OT)  by discharge  -     Progress/Outcome (Transfer Goal 1, OT)  goal ongoing  -     Row Name 11/19/20 1706          Dressing Goal 1 (OT)    Activity/Device (Dressing Goal 1, OT)  lower body dressing  -     [place]/Cues Needed (Dressing Goal 1, OT)  contact guard assist  -     Time Frame (Dressing Goal 1, OT)  by discharge  -     Progress/Outcome (Dressing Goal 1, OT)  goal ongoing  -     Row Name 11/19/20 1706          Toileting Goal 1 (OT)    Activity/Device (Toileting Goal 1, OT)  adjust/manage clothing;perform perineal hygiene  -     [place] Level/Cues Needed (Toileting Goal 1, OT)  supervision required  -     Time Frame (Toileting Goal 1, OT)  by discharge  -     Progress/Outcome (Toileting Goal 1, OT)  goal ongoing  -     Row Name 11/19/20 1706          Strength Goal 1 (OT)    Strength Goal 1 (OT)  Pt will perform UB strengthening ex using theraband for resistance,  -     Time Frame (Strength Goal 1, OT)  long term goal (LTG);5 days  -     Progress/Outcome (Strength Goal 1,  OT)  goal ongoing  -[facility] Name 11/19/20 1706          Therapy Assessment/Plan (OT)    Planned Therapy Interventions (OT)  BADL retraining;strengthening exercise;transfer/mobility retraining  -       User Key  (r) = Recorded By, (t) = Taken By, (c) = Cosigned By    Initials Name Provider Type    Saida Wylie Occupational Therapist        Clinical Impression     [facility] Name 11/19/20 1704          Pain Scale: Numbers Pre/Post-Treatment    Pretreatment Pain Rating  0/10 - no pain  -     Posttreatment Pain Rating  0/10 - no pain  -[facility] Name 11/19/20 1704          Plan of Care Review    Plan of Care Reviewed With  patient  -     Progress  no change  -     Outcome Summary  Pt seen for OT evaluation today. Pt presents with weakness and decreased independence with self care and functional mobility tasks.  Pt is expected to benefit from skilled OT to improve her strength and independence with ADL tasks.  -AH     Row Name 11/19/20 1704          Therapy Assessment/Plan (OT)    Patient/Family Therapy Goal Statement (OT)  d/c home  -     Rehab Potential (OT)  good, to achieve stated therapy goals  -     Criteria for Skilled Therapeutic Interventions Met (OT)  yes;skilled treatment is necessary  -     Therapy Frequency (OT)  3 times/wk  -[facility] Name 11/19/20 1704          Therapy Plan Review/Discharge Plan (OT)    Anticipated Discharge Disposition (OT)  home with home health  -[facility] 11/19/20 1704          Positioning and Restraints    Pre-Treatment Position  in bed  -     Post Treatment Position  chair  -     In Chair  sitting;call light within reach;encouraged to call for assist  -       User Key  (r) = Recorded By, (t) = Taken By, (c) = Cosigned By    Initials Name Provider Type    Saida Wylie Occupational Therapist        Outcome Measures     [facility] Name 11/19/20 1707          How much help from another is currently needed...    Putting on and taking off regular lower body  clothing?  3  -AH     Bathing (including washing, rinsing, and drying)  3  -AH     Toileting (which includes using toilet bed pan or urinal)  3  -AH     Putting on and taking off regular upper body clothing  3  -AH     Taking care of personal grooming (such as brushing teeth)  4  -AH     Eating meals  4  -     AM-PAC 6 Clicks Score (OT)  20  -     Row Name 11/19/20 1707          Functional Assessment    Outcome Measure Options  AM-PAC 6 Clicks Daily Activity (OT)  -       User Key  (r) = Recorded By, (t) = Taken By, (c) = Cosigned By    Initials Name Provider Type     Saida Serrato Occupational Therapist        Occupational Therapy Education                 Title: PT OT SLP Therapies (In Progress)     Topic: Occupational Therapy (In Progress)     Point: ADL training (Done)     Description:   Instruct learner(s) on proper safety adaptation and remediation techniques during self care or transfers.   Instruct in proper use of assistive devices.              Learning Progress Summary           Patient Acceptance, E,TB, VU by  at 11/19/2020 6131    Comment: Role of OT/POC                   Point: Home exercise program (Not Started)     Description:   Instruct learner(s) on appropriate technique for monitoring, assisting and/or progressing therapeutic exercises/activities.              Learner Progress:  Not documented in this visit.          Point: Precautions (Not Started)     Description:   Instruct learner(s) on prescribed precautions during self-care and functional transfers.              Learner Progress:  Not documented in this visit.          Point: Body mechanics (Not Started)     Description:   Instruct learner(s) on proper positioning and spine alignment during self-care, functional mobility activities and/or exercises.              Learner Progress:  Not documented in this visit.                      User Key     Initials Effective Dates Name Provider Type Discipline     03/07/18 -  Saida Serrato  Occupational Therapist OT              OT Recommendation and Plan  Planned Therapy Interventions (OT): BADL retraining, strengthening exercise, transfer/mobility retraining  Therapy Frequency (OT): 3 times/wk  Plan of Care Review  Plan of Care Reviewed With: patient  Progress: no change  Outcome Summary: Pt seen for OT evaluation today. Pt presents with weakness and decreased independence with self care and functional mobility tasks.  Pt is expected to benefit from skilled OT to improve her strength and independence with ADL tasks.     Time Calculation:   Time Calculation- OT     Row Name 11/19/20 1708             Time Calculation- OT    OT Start Time  1612  -      OT Received On  11/19/20  -      OT Goal Re-Cert Due Date  11/29/20  -        User Key  (r) = Recorded By, (t) = Taken By, (c) = Cosigned By    Initials Name Provider Type    Saida Wylie Occupational Therapist        Therapy Charges for Today     Code Description Service Date Service Provider Modifiers Qty    [id number]  OT EVAL LOW COMPLEXITY 3 11/19/2020 Saida Serrato GO 1               Saida Serrato  11/19/2020

## 2020-11-19 NOTE — OUTREACH NOTE
Stroke Week 2 Survey      Responses   [facility] patient discharged from?  [facility]   Does the patient have one of the following disease processes/diagnoses(primary or secondary)?  Stroke (TIA)   Week 2 attempt successful?  No   Revoke  Readmitted          Stefania Naqvi RN

## 2020-11-19 NOTE — PLAN OF CARE
Problem: Adult Inpatient Plan of Care  Goal: Plan of Care Review  Recent Flowsheet Documentation  Taken 11/19/2020 1704 by Saida Serrato  Progress: no change  Plan of Care Reviewed With: patient  Outcome Summary: Pt seen for OT evaluation today. Pt presents with weakness and decreased independence with self care and functional mobility tasks.  Pt is expected to benefit from skilled OT to improve her strength and independence with ADL tasks.

## 2020-11-19 NOTE — PLAN OF CARE
PT josh completed. Pt presents with impaired balance, strength, and endurance. She ambulated 14' with FWW with CGA using vcs for safety on turns. PT skilled services appropriate to improve activity tolerance and general strength for functional mobility prior to D/C.

## 2020-11-19 NOTE — THERAPY EVALUATION
Patient Name: Tanya Elise  : 1933    MRN: [id number]                              Today's Date: 2020       Admit Date: 2020    Visit Dx:     ICD-10-CM ICD-9-CM   1. Chronic kidney disease, unspecified CKD stage  N18.9 585.9   2. Acute renal failure, unspecified acute renal failure type (CMS/HCC)  N17.9 584.9   3. Vertigo  R42 780.4   4. Non-intractable vomiting with nausea, unspecified vomiting type  R11.2 787.01     Patient Active Problem List   Diagnosis   • Pure hypercholesterolemia   • Essential hypertension   • Temporary cerebral vascular dysfunction   • Esophageal reflux   • BMI 35.0-35.9,adult   • Diarrhea   • TIA (transient ischemic attack)   • Hypertensive urgency   • Chronic kidney disease, stage III (moderate)   • Acute ST elevation myocardial infarction (STEMI) (CMS/HCC)   • ST elevation myocardial infarction (STEMI) (CMS/HCC)   • Coronary artery disease involving native coronary artery of native heart without angina pectoris   • Dyslipidemia   • Transient ischemic attack   • Left lower quadrant abdominal pain   • Acute kidney injury superimposed on chronic kidney disease (CMS/HCC)   • Acute renal failure (ARF) (CMS/HCC)   • Acute metabolic encephalopathy     Past Medical History:   Diagnosis Date   • Acid reflux    • Allergic    • Cataract    • Cerumen impaction    • Dizziness    • High cholesterol    • Hypertension    • Impaired functional mobility, balance, gait, and endurance    • Influenza    • Reflux gastritis    • Seasonal allergies    • Spinal headache    • Stroke (CMS/HCC)    • Transient ischemic attack    • Wears glasses      Past Surgical History:   Procedure Laterality Date   • APPENDECTOMY     • BREAST BIOPSY Left    • CARDIAC CATHETERIZATION N/A 10/13/2020    Procedure: Left Heart Cath;  Surgeon: Travis Franco MD;  Location: [facility] INVASIVE LOCATION;  Service: Cardiovascular;  Laterality: N/A;   • CATARACT EXTRACTION Bilateral    •  SECTION       X2   • COLONOSCOPY     • DILATATION AND CURETTAGE     • EYE CAPSULOTOMY WITH LASER Right 3/20/2017    Procedure: RIGHT EYE CAPSULOTOMY WITH LASER;  Surgeon: Ev Cortez MD;  Location: [facility];  Service:    • HYSTERECTOMY     • NASAL SEPTUM SURGERY     • TOOTH EXTRACTION       General Information     Row Name 11/19/20 1631          Physical Therapy Time and Intention    Document Type  evaluation  (Pended)   -KG     Mode of Treatment  physical therapy  (Pended)   -KG     Row Name 11/19/20 1631          General Information    Prior Level of Function  independent:;all household mobility  (Pended)   -KG     Existing Precautions/Restrictions  fall  (Pended)   -KG     Barriers to Rehab  none identified  (Pended)   -KG     Row Name 11/19/20 1631          Living Environment    Lives With  child(urban), adult  (Pended)   -KG     Row Name 11/19/20 1631          Home Main Entrance    Number of Stairs, Main Entrance  three  (Pended)   -KG     Stair Railings, Main Entrance  none  (Pended)   -KG     Row Name 11/19/20 1631          Stairs Within Home, Primary    Number of Stairs, Within Home, Primary  none  (Pended)   -KG     Row Name 11/19/20 1631          Cognition    Orientation Status (Cognition)  oriented x 4  (Pended)   -KG     Row Name 11/19/20 1631          Safety Issues, Functional Mobility    Safety Issues Affecting Function (Mobility)  safety precaution awareness;safety precautions follow-through/compliance  (Pended)   -KG     Impairments Affecting Function (Mobility)  strength;endurance/activity tolerance;balance  (Pended)   -KG       User Key  (r) = Recorded By, (t) = Taken By, (c) = Cosigned By    Initials Name Provider Type    KG Amanda Doty, PT Student PT Student        Mobility     Row Name 11/19/20 1632          Bed Mobility    Bed Mobility  supine-sit  (Pended)   -KG     Supine-Sit [place] (Bed Mobility)  contact guard  (Pended)   -KG     Assistive Device (Bed Mobility)  bed rails;head of bed elevated   (Pended)   -KG     Row Name 11/19/20 1632          Sit-Stand Transfer    Sit-Stand [place] (Transfers)  contact guard  (Pended)   -KG     Assistive Device (Sit-Stand Transfers)  walker, front-wheeled  (Pended)   -KG     Row Name 11/19/20 1632          Gait/Stairs (Locomotion)    [place] Level (Gait)  contact guard  (Pended)   -KG     Assistive Device (Gait)  walker, front-wheeled  (Pended)   -KG     Distance in Feet (Gait)  14'  (Pended)   -KG     Deviations/Abnormal Patterns (Gait)  gait speed decreased;stride length decreased;weight shifting decreased  (Pended)   -KG     Bilateral Gait Deviations  forward flexed posture  (Pended)   -KG       User Key  (r) = Recorded By, (t) = Taken By, (c) = Cosigned By    Initials Name Provider Type    Amanda Constantino, PT Student PT Student        Obj/Interventions     Row Name 11/19/20 1633          Range of Motion Comprehensive    General Range of Motion  bilateral lower extremity ROM WFL  (Pended)   -KG     Row Name 11/19/20 1633          Strength Comprehensive (MMT)    General Manual Muscle Testing (MMT) Assessment  lower extremity strength deficits identified  (Pended)   -KG     Comment, General Manual Muscle Testing (MMT) Assessment  Gross LE 3+/5  (Pended)   -KG     Row Name 11/19/20 1633          Balance    Balance Assessment  sitting static balance;sitting dynamic balance;standing static balance;standing dynamic balance  (Pended)   -KG     Static Sitting Balance  WFL  (Pended)   -KG     Dynamic Sitting Balance  WFL  (Pended)   -KG     Static Standing Balance  mild impairment  (Pended)   -KG     Dynamic Standing Balance  mild impairment  (Pended)   -KG     Balance Interventions  sitting;standing;sit to stand  (Pended)   -KG       User Key  (r) = Recorded By, (t) = Taken By, (c) = Cosigned By    Initials Name Provider Type    Amanda Constantino, PT Student PT Student        Goals/Plan     Row Name 11/19/20 1636          Transfer Goal 1 (PT)    Activity/Assistive  Device (Transfer Goal 1, PT)  sit-to-stand/stand-to-sit;bed-to-chair/chair-to-bed;toilet  (Pended)   -KG     [place] Level/Cues Needed (Transfer Goal 1, PT)  modified independence  (Pended)   -KG     Time Frame (Transfer Goal 1, PT)  short term goal (STG);10 days  (Pended)   -KG     Row Name 11/19/20 1636          Gait Training Goal 1 (PT)    Activity/Assistive Device (Gait Training Goal 1, PT)  gait (walking locomotion);assistive device use  (Pended)  R HHA to simulate cane  -KG     [place] Level (Gait Training Goal 1, PT)  modified independence  (Pended)   -KG     Distance (Gait Training Goal 1, PT)  200  (Pended)   -KG     Time Frame (Gait Training Goal 1, PT)  short term goal (STG);10 days  (Pended)   -KG     Progress/Outcome (Gait Training Goal 1, PT)  goal ongoing  (Pended)   -KG     Row Name 11/19/20 1636          Stairs Goal 1 (PT)    Activity/Assistive Device (Stairs Goal 1, PT)  ascending stairs;descending stairs  (Pended)   -KG     [place] Level/Cues Needed (Stairs Goal 1, PT)  supervision required  (Pended)   -KG     Number of Stairs (Stairs Goal 1, PT)  3  (Pended)   -KG     Time Frame (Stairs Goal 1, PT)  short term goal (STG);10 days  (Pended)   -KG     Progress/Outcome (Stairs Goal 1, PT)  goal ongoing  (Pended)   -KG       User Key  (r) = Recorded By, (t) = Taken By, (c) = Cosigned By    Initials Name Provider Type    KG Amanda Doty, PT Student PT Student        Clinical Impression     Row Name 11/19/20 1634          Pain    Additional Documentation  Pain Scale: Numbers Pre/Post-Treatment (Group)  (Pended)   -KG     Row Name 11/19/20 1634          Pain Scale: Numbers Pre/Post-Treatment    Pretreatment Pain Rating  0/10 - no pain  (Pended)   -KG     Posttreatment Pain Rating  0/10 - no pain  (Pended)   -KG     Row Name 11/19/20 1634          Plan of Care Review    Plan of Care Reviewed With  patient  (Pended)   -KG     Progress  no change  (Pended)   -KG     Outcome Summary  PT eval  completed. Pt presents with impaired balance, strength, and endurance. She ambulated 14' with FWW with CGA using vcs for safety on turns. PT skilled services appropriate to address deficits prior to D/C.  (Pended)   -KG     Row Name 11/19/20 1634          Therapy Assessment/Plan (PT)    Rehab Potential (PT)  good, to achieve stated therapy goals  (Pended)   -KG     Criteria for Skilled Interventions Met (PT)  yes;skilled treatment is necessary  (Pended)   -KG     Row Name 11/19/20 1634          Vital Signs    Pre SpO2 (%)  98  (Pended)   -KG     O2 Delivery Pre Treatment  room air  (Pended)   -KG     Intra SpO2 (%)  95  (Pended)   -KG     O2 Delivery Intra Treatment  room air  (Pended)   -KG     Post SpO2 (%)  96  (Pended)   -KG     O2 Delivery Post Treatment  room air  (Pended)   -KG     Row Name 11/19/20 1634          Positioning and Restraints    Pre-Treatment Position  in bed  (Pended)   -KG     Post Treatment Position  chair  (Pended)   -KG     In Chair  sitting;call light within reach;encouraged to call for assist  (Pended)   -KG       User Key  (r) = Recorded By, (t) = Taken By, (c) = Cosigned By    Initials Name Provider Type    KG Amanda Doty, PT Student PT Student        Outcome Measures     Row Name 11/19/20 1638          How much help from another person do you currently need...    Turning from your back to your side while in flat bed without using bedrails?  4  (Pended)   -KG     Moving from lying on back to sitting on the side of a flat bed without bedrails?  4  (Pended)   -KG     Moving to and from a bed to a chair (including a wheelchair)?  3  (Pended)   -KG     Standing up from a chair using your arms (e.g., wheelchair, bedside chair)?  4  (Pended)   -KG     Climbing 3-5 steps with a railing?  3  (Pended)   -KG     To walk in hospital room?  3  (Pended)   -KG     AM-PAC 6 Clicks Score (PT)  21  (Pended)   -KG     Row Name 11/19/20 1638          Functional Assessment    Outcome Measure Options   AM-PAC 6 Clicks Basic Mobility (PT)  (Pended)   -KG       User Key  (r) = Recorded By, (t) = Taken By, (c) = Cosigned By    Initials Name Provider Type    Amanda Constantino PT Student PT Student          PT Recommendation and Plan     Plan of Care Reviewed With: (P) patient  Progress: (P) no change  Outcome Summary: (P) PT eval completed. Pt presents with impaired balance, strength, and endurance. She ambulated 14' with FWW with CGA using vcs for safety on turns. PT skilled services appropriate to address deficits prior to D/C.     Time Calculation:   PT Charges     Row Name 11/19/20 1639             Time Calculation    Start Time  0420  (Pended)   -KG      PT Received On  11/19/20  (Pended)   -KG      PT Goal Re-Cert Due Date  11/29/20  (Pended)   -KG        User Key  (r) = Recorded By, (t) = Taken By, (c) = Cosigned By    Initials Name Provider Type    Amanda Constantino PT Student PT Student        Therapy Charges for Today     Code Description Service Date Service Provider Modifiers Qty    [id number] HC PT EVAL LOW COMPLEXITY 3 11/19/2020 Amanda Doty PT Student GP 1          PT G-Codes  Outcome Measure Options: (P) AM-PAC 6 Clicks Basic Mobility (PT)  AM-PAC 6 Clicks Score (PT): (P) 21    Amanda Doty PT Student  11/19/2020

## 2020-11-19 NOTE — PROGRESS NOTES
[facility]    Name:  Tanya Elise   Age:  87 y.o.  Sex:  female  :  1933  MRN:  [id number]   Visit Number:  [id number]  Admission Date:  2020  Date Of Service:  20  Primary Care Physician:  Aileen Grande APRN     LOS: 1 day :  Patient Care Team:  Aileen Grande APRN as PCP - General (Family Medicine)  Serjio Jackson MD as PCP - Family Medicine:    Chief Complaint:      Generalized weakness, jerking of the hands, dizziness.    Subjective / Interval History:     Ms. Elise was seen and examined this morning.  She was recently discharged from this facility after being treated for colitis, TIA.  She was started on Eliquis and was continued on Plavix.  She also has recent history of cardiac stents.  Patient was admitted with persistent nausea and vomiting, dehydration and acute renal failure.  She has been placed on IV fluids with improvement in her renal function.  She denies any chest pain or shortness of breath but does complain of vertigo when walking.  No fevers.  Previous physician documentation, laboratory and imaging data have been reviewed.    Review of Systems:     General ROS: Patient denies any fevers, chills or loss of consciousness.  Generalized weakness.  Respiratory ROS: Denies cough or shortness of breath.  Cardiovascular ROS: Denies chest pain or palpitations. No history of exertional chest pain.  Gastrointestinal ROS: History of nausea and vomiting.  Left lower quadrant pain.  Neurological ROS: Denies any focal weakness. Complaints of dizziness.  Dermatological ROS: Denies any redness or pruritis.    Vital Signs:    Temp:  [96.8 °F (36 °C)-98.6 °F (37 °C)] 98.6 °F (37 °C)  Heart Rate:  [53-68] 54  Resp:  [16-18] 18  BP: (110-136)/(37-96) 125/52    Intake and output:    I/O last 3 completed shifts:  In: -   Out: 850 [Urine:850]  I/O this shift:  In: 360 [P.O.:360]  Out: 750 [Urine:750]    Physical  Examination:    General Appearance:  Alert and cooperative, not in any acute distress.   Head:  Atraumatic and normocephalic, without obvious abnormality.   Eyes:          Conjunctivae and sclerae normal, no Icterus. No pallor. Extraocular movements are within normal limits.  No nystagmus noted on lateral or superior gaze.   Neck: Supple, trachea midline, no thyromegaly, no carotid bruit.   Lungs:   Chest shape is normal. Breath sounds heard bilaterally equally.  No crackles or wheezing. No pleural rub or bronchial breathing.   Heart:  Normal S1 and S2, no murmur, no gallop, no rub. No JVD   Abdomen:   Normal bowel sounds, no masses, no organomegaly. Soft, nontender, nondistended, no guarding, no rebound tenderness.   Extremities: Moves all extremities, no edema, no cyanosis, no clubbing.   Skin: No bleeding, bruising or rash.   Neurologic: Awake, alert and oriented times 3. Moves all 4 extremities equally.  Hand  is strong bilaterally.  She does have minimal asterixis.     Laboratory results:    Results from last 7 days   Lab Units 11/19/20 0606 11/18/20 1759 11/14/20 0552  11/12/20  2352   SODIUM mmol/L 142 136 142   < > 136   POTASSIUM mmol/L 3.9 4.8 4.9   < > 5.0   CHLORIDE mmol/L 113* 102 114*   < > 104   CO2 mmol/L 19.5* 20.1* 19.4*   < > 18.0*   BUN mg/dL 40* 44* 48*   < > 71*   CREATININE mg/dL 2.88* 3.41* 1.75*   < > 2.31*   CALCIUM mg/dL 8.4* 9.3 8.3*   < > 9.3   BILIRUBIN mg/dL  --  0.2  --   --  0.3   ALK PHOS U/L  --  45  --   --  55   ALT (SGPT) U/L  --  12  --   --  11   AST (SGOT) U/L  --  21  --   --  16   GLUCOSE mg/dL 90 131* 98   < > 102*    < > = values in this interval not displayed.     Results from last 7 days   Lab Units 11/19/20 0606 11/18/20 1759 11/14/20  0552   WBC 10*3/mm3 6.61 7.63 5.21   HEMOGLOBIN g/dL 8.3* 9.9* 8.1*   HEMATOCRIT % 25.7* 30.3* 24.8*   PLATELETS 10*3/mm3 196 229 180     Results from last 7 days   Lab Units 11/12/20  2352   INR  1.12*     Results from last 7  days   Lab Units 11/18/20  1759 11/12/20  2352   TROPONIN T ng/mL <0.010 <0.010               I have reviewed the patient's laboratory results.    Radiology results:    Imaging Results (Last 24 Hours)     Procedure Component Value Units Date/Time    CT Head Without Contrast [[id number]] Collected: 11/18/20 1810     Updated: 11/18/20 1811    Narrative:      FINAL REPORT    TECHNIQUE:  Routine axial images through the head were obtained without  contrast.    CLINICAL HISTORY:  Vomiting, lightheaded    FINDINGS:  The ventricles are dilated, proportionate to the degree of  generalized atrophy.  Periventricular and deep white matter  low-attenuation areas are consistent with chronic small vessel  ischemia.  There is no mass or shift in midline structures.  There is no intracranial hemorrhage.  There is no acute sinus or  osseous abnormality.      Impression:      No acute intracranial abnormality.    Authenticated by Jean Carlos Martin M.D. on 11/18/2020 06:10:48 PM        I have reviewed the patient's radiology reports.    Medication Review:     I have reviewed the patient's active and prn medications.     Problem List:      Acute renal failure (ARF) (CMS/formerly [facility])    Assessment:    1.  Acute metabolic encephalopathy secondary to #2 with myoclonic jerks and dizziness, POA.  2.  Acute renal failure secondary to dehydration and use of diuretics, POA.  3.  Chronic kidney disease stage III.  4.  Recent history of sigmoid diverticulitis on antibiotics therapy.  5.  Recent history of myocardial infarction status post PCI on 11/13/2020  6.  Suspected intermittent atrial fibrillation on apixaban.  7.  Essential hypertension.  8.  Dyslipidemia.    Plan:    Ms. Elise is currently doing better and her renal function is slowly improving with IV hydration.  We will hold all nephrotoxic agents.  She will be continued on aspirin and apixaban.  We will consult physical and Occupational Therapy due to her dizziness, vertigo and unsteady gait.  This  could be related to her recent TIA or this could be related to her uremia causing myoclonic jerks.    At this time, I do not suspect any infection.  She is not on any antibiotics.  She recently completed a course of Levaquin and Flagyl for her diverticulitis.  I will place her on lactobacillus supplements.  We will repeat her blood work in the morning.  She does not have any new neurological deficits on this morning's examination.  I have discussed the patient's condition with nursing staff and multidisciplinary team.  Further recommendations depend upon her clinical course and improvement in her renal function.    Brandan Cherry MD  11/19/20  16:06 EST    Dictated utilizing Dragon dictation.

## 2020-11-19 NOTE — PLAN OF CARE
Problem: Adult Inpatient Plan of Care  Goal: Plan of Care Review  Outcome: Ongoing, Progressing  Flowsheets (Taken 11/19/2020 1828)  Plan of Care Reviewed With: patient  Outcome Summary: Patient rested well today. Pt up in chair at bedside. No copmlaints this shift. Will continue to monitor.  Goal: Patient-Specific Goal (Individualized)  Outcome: Ongoing, Progressing  Goal: Absence of Hospital-Acquired Illness or Injury  Outcome: Ongoing, Progressing  Intervention: Identify and Manage Fall Risk  Recent Flowsheet Documentation  Taken 11/19/2020 1800 by Kelle Helms RN  Safety Promotion/Fall Prevention: safety round/check completed  Taken 11/19/2020 1600 by Kelle Helms RN  Safety Promotion/Fall Prevention: safety round/check completed  Taken 11/19/2020 1400 by Kelle Helms RN  Safety Promotion/Fall Prevention: safety round/check completed  Taken 11/19/2020 1200 by Kelle Helms RN  Safety Promotion/Fall Prevention: safety round/check completed  Taken 11/19/2020 0800 by Kelle Helms RN  Safety Promotion/Fall Prevention: assistive device/personal items within reach  Intervention: Prevent Skin Injury  Recent Flowsheet Documentation  Taken 11/19/2020 1800 by Kelle Helms RN  Body Position: weight shift assistance provided  Taken 11/19/2020 1600 by Kelle Helms RN  Body Position: weight shift assistance provided  Taken 11/19/2020 1400 by Kelle Helms RN  Body Position: weight shift assistance provided  Taken 11/19/2020 1200 by Kelle Helms RN  Body Position: weight shift assistance provided  Taken 11/19/2020 0800 by Kelle Helms RN  Body Position: position changed independently  Intervention: Prevent and Manage VTE (venous thromboembolism) Risk  Recent Flowsheet Documentation  Taken 11/19/2020 1400 by Kelle Helms RN  VTE Prevention/Management:   bilateral   dorsiflexion/plantar flexion performed  Taken 11/19/2020 0800 by Kelle Helms RN  VTE Prevention/Management:   bilateral   dorsiflexion/plantar flexion  performed  Goal: Optimal Comfort and Wellbeing  Outcome: Ongoing, Progressing  Intervention: Provide Person-Centered Care  Recent Flowsheet Documentation  Taken 11/19/2020 0800 by Kelle Helms RN  Trust Relationship/Rapport: care explained  Goal: Readiness for Transition of Care  Outcome: Ongoing, Progressing     Problem: Electrolyte Imbalance (Acute Kidney Injury/Impairment)  Goal: Serum Electrolyte Balance  Outcome: Ongoing, Progressing     Problem: Fluid Imbalance (Acute Kidney Injury/Impairment)  Goal: Optimal Fluid Balance  Outcome: Ongoing, Progressing     Problem: Hematologic Alteration (Acute Kidney Injury/Impairment)  Goal: Hemoglobin, Hematocrit and Platelets Within Normal Range  Outcome: Ongoing, Progressing     Problem: Oral Intake Inadequate (Acute Kidney Injury/Impairment)  Goal: Optimal Nutrition Intake  Outcome: Ongoing, Progressing     Problem: Renal Function Impairment (Acute Kidney Injury/Impairment)  Goal: Effective Renal Function  Outcome: Ongoing, Progressing  Intervention: Monitor and Support Renal Function  Recent Flowsheet Documentation  Taken 11/19/2020 1600 by Kelle Helms RN  Medication Review/Management: medications reviewed  Taken 11/19/2020 1200 by Kelle Helms RN  Medication Review/Management: medications reviewed  Taken 11/19/2020 0800 by Kelle Helms RN  Medication Review/Management: medications reviewed     Problem: Fall Injury Risk  Goal: Absence of Fall and Fall-Related Injury  Outcome: Ongoing, Progressing  Intervention: Identify and Manage Contributors to Fall Injury Risk  Recent Flowsheet Documentation  Taken 11/19/2020 1600 by Kelle Helms RN  Medication Review/Management: medications reviewed  Taken 11/19/2020 1200 by Kelle Helms RN  Medication Review/Management: medications reviewed  Taken 11/19/2020 0800 by Kelle Helms RN  Medication Review/Management: medications reviewed  Intervention: Promote Injury-Free Environment  Recent Flowsheet Documentation  Taken  11/19/2020 1800 by Kelle Helms RN  Safety Promotion/Fall Prevention: safety round/check completed  Taken 11/19/2020 1600 by Kelle Helms RN  Safety Promotion/Fall Prevention: safety round/check completed  Taken 11/19/2020 1400 by Kelle Helms RN  Safety Promotion/Fall Prevention: safety round/check completed  Taken 11/19/2020 1200 by Kelle Helms RN  Safety Promotion/Fall Prevention: safety round/check completed  Taken 11/19/2020 0800 by Kelle Helms RN  Safety Promotion/Fall Prevention: assistive device/personal items within reach   Goal Outcome Evaluation:  Plan of Care Reviewed With: patient  Progress: no change  Outcome Summary: Patient rested well today. Pt up in chair at bedside. No copmlaints this shift. Will continue to monitor.

## 2020-11-20 LAB
ANION GAP SERPL CALCULATED.3IONS-SCNC: 7.7 MMOL/L (ref 5–15)
BUN SERPL-MCNC: 35 MG/DL (ref 8–23)
BUN/CREAT SERPL: 16.9 (ref 7–25)
CALCIUM SPEC-SCNC: 8 MG/DL (ref 8.6–10.5)
CHLORIDE SERPL-SCNC: 116 MMOL/L (ref 98–107)
CO2 SERPL-SCNC: 19.3 MMOL/L (ref 22–29)
CREAT SERPL-MCNC: 2.07 MG/DL (ref 0.57–1)
DEPRECATED RDW RBC AUTO: 47.4 FL (ref 37–54)
ERYTHROCYTE [DISTWIDTH] IN BLOOD BY AUTOMATED COUNT: 12.9 % (ref 12.3–15.4)
GFR SERPL CREATININE-BSD FRML MDRD: 23 ML/MIN/1.73
GLUCOSE SERPL-MCNC: 99 MG/DL (ref 65–99)
HCT VFR BLD AUTO: 26.3 % (ref 34–46.6)
HGB BLD-MCNC: 8.3 G/DL (ref 12–15.9)
MCH RBC QN AUTO: 31.7 PG (ref 26.6–33)
MCHC RBC AUTO-ENTMCNC: 31.6 G/DL (ref 31.5–35.7)
MCV RBC AUTO: 100.4 FL (ref 79–97)
PLATELET # BLD AUTO: 194 10*3/MM3 (ref 140–450)
PMV BLD AUTO: 11.8 FL (ref 6–12)
POTASSIUM SERPL-SCNC: 4.1 MMOL/L (ref 3.5–5.2)
RBC # BLD AUTO: 2.62 10*6/MM3 (ref 3.77–5.28)
SODIUM SERPL-SCNC: 143 MMOL/L (ref 136–145)
WBC # BLD AUTO: 6.03 10*3/MM3 (ref 3.4–10.8)

## 2020-11-20 PROCEDURE — 97530 THERAPEUTIC ACTIVITIES: CPT

## 2020-11-20 PROCEDURE — 85027 COMPLETE CBC AUTOMATED: CPT | Performed by: INTERNAL MEDICINE

## 2020-11-20 PROCEDURE — 80048 BASIC METABOLIC PNL TOTAL CA: CPT | Performed by: INTERNAL MEDICINE

## 2020-11-20 PROCEDURE — 97116 GAIT TRAINING THERAPY: CPT

## 2020-11-20 PROCEDURE — 99232 SBSQ HOSP IP/OBS MODERATE 35: CPT | Performed by: INTERNAL MEDICINE

## 2020-11-20 RX ORDER — FLUTICASONE PROPIONATE 50 MCG
1 SPRAY, SUSPENSION (ML) NASAL DAILY
Status: DISCONTINUED | OUTPATIENT
Start: 2020-11-20 | End: 2020-11-21 | Stop reason: HOSPADM

## 2020-11-20 RX ORDER — FLUTICASONE PROPIONATE 50 MCG
1 SPRAY, SUSPENSION (ML) NASAL DAILY
Qty: 18.2 ML | Refills: 3 | Status: SHIPPED | OUTPATIENT
Start: 2020-11-20 | End: 2021-12-06

## 2020-11-20 RX ADMIN — ASPIRIN 325 MG: 325 TABLET, COATED ORAL at 08:49

## 2020-11-20 RX ADMIN — AMLODIPINE BESYLATE 5 MG: 5 TABLET ORAL at 21:06

## 2020-11-20 RX ADMIN — APIXABAN 2.5 MG: 2.5 TABLET, FILM COATED ORAL at 21:06

## 2020-11-20 RX ADMIN — Medication 5 MG: at 21:06

## 2020-11-20 RX ADMIN — CARVEDILOL 3.12 MG: 3.12 TABLET, FILM COATED ORAL at 17:04

## 2020-11-20 RX ADMIN — CLOPIDOGREL BISULFATE 75 MG: 75 TABLET ORAL at 08:49

## 2020-11-20 RX ADMIN — BACLOFEN 5 MG: 10 TABLET ORAL at 21:06

## 2020-11-20 RX ADMIN — FLUTICASONE PROPIONATE 1 SPRAY: 50 SPRAY, METERED NASAL at 15:12

## 2020-11-20 RX ADMIN — Medication 1 CAPSULE: at 21:06

## 2020-11-20 RX ADMIN — Medication 1 CAPSULE: at 08:49

## 2020-11-20 RX ADMIN — CARVEDILOL 3.12 MG: 3.12 TABLET, FILM COATED ORAL at 08:49

## 2020-11-20 RX ADMIN — APIXABAN 2.5 MG: 2.5 TABLET, FILM COATED ORAL at 08:49

## 2020-11-20 RX ADMIN — SODIUM CHLORIDE, PRESERVATIVE FREE 10 ML: 5 INJECTION INTRAVENOUS at 08:49

## 2020-11-20 RX ADMIN — PRAVASTATIN SODIUM 40 MG: 20 TABLET ORAL at 21:06

## 2020-11-20 NOTE — PROGRESS NOTES
[facility]    Name:  Tanya Elise   Age:  87 y.o.  Sex:  female  :  1933  MRN:  [id number]   Visit Number:  [id number]  Admission Date:  2020  Date Of Service:  20  Primary Care Physician:  Aileen Grande APRN     LOS: 2 days :  Patient Care Team:  Aileen Grande APRN as PCP - General (Family Medicine)  Serjio Jackson MD as PCP - Family Medicine:    Chief Complaint:      Generalized weakness, jerking of the hands, dizziness.    Subjective / Interval History:     Ms. Elise was seen and examined this morning.  She is feeling better with regards to her tremors and jerking.  She states she did walk with physical therapy yesterday but did have some dizziness but it has improved compared to the past.  She denies chest pain or shortness of breath.  She lives with her son and is intending to go home when discharged.  She states that she did go last week to Dr. Franco's office and was placed on loop recorder which he apparently returned back after 4 days.    She was recently discharged from this facility on 2020 after being treated for colitis, TIA.  She was started on Eliquis and was continued on Plavix.  She was seen by neurology at that time who recommended discontinuation of aspirin due to high risk of bleeding.  This was discussed with Dr. Franco her primary cardiologist.  She was subsequently seen by cardiology office for event monitor placement.  She also has recent history of cardiac stents.  Patient was admitted this time with persistent nausea and vomiting, dehydration and acute renal failure.  This was possibly related to use of Lasix and lisinopril.  These medications were discontinued.  She was placed on IV fluids with improvement in her renal function.  She had myoclonic jerks likely secondary to uremia and is improved with improvement in her renal function.  She was started on physical and occupational  therapy and was able to walk with them in the hallway.    Review of Systems:     General ROS: Patient denies any fevers, chills or loss of consciousness.  Generalized weakness.  Respiratory ROS: Denies cough or shortness of breath.  Cardiovascular ROS: Denies chest pain or palpitations. No history of exertional chest pain.  Gastrointestinal ROS: History of nausea and vomiting.  Left lower quadrant pain.  Neurological ROS: Denies any focal weakness. Complaints of dizziness.  Dermatological ROS: Denies any redness or pruritis.    Vital Signs:    Temp:  [97.8 °F (36.6 °C)-98.6 °F (37 °C)] 97.9 °F (36.6 °C)  Heart Rate:  [] 56  Resp:  [16-20] 18  BP: ()/(37-79) 124/44    Intake and output:    I/O last 3 completed shifts:  In: 680 [P.O.:680]  Out: 3275 [Urine:3275]  I/O this shift:  In: 320 [P.O.:320]  Out: 950 [Urine:950]    Physical Examination:    General Appearance:  Alert and cooperative, not in any acute distress.   Head:  Atraumatic and normocephalic, without obvious abnormality.   Eyes:          Conjunctivae and sclerae normal, no Icterus. No pallor. Extraocular movements are within normal limits.  No nystagmus noted on lateral or superior gaze.   Neck: Supple, trachea midline, no thyromegaly, no carotid bruit.   Lungs:   Chest shape is normal. Breath sounds heard bilaterally equally.  No crackles or wheezing. No pleural rub or bronchial breathing.   Heart:  Normal S1 and S2, no murmur, no gallop, no rub. No JVD   Abdomen:   Normal bowel sounds, no masses, no organomegaly. Soft, nontender, nondistended, no guarding, no rebound tenderness.   Extremities: Moves all extremities, no edema, no cyanosis, no clubbing.   Skin: No bleeding, bruising or rash.   Neurologic: Awake, alert and oriented times 3. Moves all 4 extremities equally.  Hand  is strong bilaterally.  No asterixis.     Laboratory results:    Results from last 7 days   Lab Units 11/20/20  0627 11/19/20  0606 11/18/20  1759   SODIUM mmol/L  143 142 136   POTASSIUM mmol/L 4.1 3.9 4.8   CHLORIDE mmol/L 116* 113* 102   CO2 mmol/L 19.3* 19.5* 20.1*   BUN mg/dL 35* 40* 44*   CREATININE mg/dL 2.07* 2.88* 3.41*   CALCIUM mg/dL 8.0* 8.4* 9.3   BILIRUBIN mg/dL  --   --  0.2   ALK PHOS U/L  --   --  45   ALT (SGPT) U/L  --   --  12   AST (SGOT) U/L  --   --  21   GLUCOSE mg/dL 99 90 131*     Results from last 7 days   Lab Units 11/20/20  0627 11/19/20  0606 11/18/20  1759   WBC 10*3/mm3 6.03 6.61 7.63   HEMOGLOBIN g/dL 8.3* 8.3* 9.9*   HEMATOCRIT % 26.3* 25.7* 30.3*   PLATELETS 10*3/mm3 194 196 229         Results from last 7 days   Lab Units 11/18/20  1759   TROPONIN T ng/mL <0.010               I have reviewed the patient's laboratory results.    Radiology results:    Imaging Results (Last 24 Hours)     ** No results found for the last 24 hours. **        I have reviewed the patient's radiology reports.    Medication Review:     I have reviewed the patient's active and prn medications.     Problem List:      Acute renal failure (ARF) (CMS/[facility])    Essential hypertension    BMI 35.0-35.9,adult    Chronic kidney disease, stage III (moderate)    Coronary artery disease involving native coronary artery of native heart without angina pectoris    Dyslipidemia    Acute metabolic encephalopathy    Assessment:    1.  Acute metabolic encephalopathy secondary to #2 with myoclonic jerks and dizziness, POA.  2.  Acute renal failure secondary to dehydration and use of diuretics, POA.  3.  Chronic kidney disease stage III.  4.  Recent history of sigmoid diverticulitis on antibiotics therapy.  5.  Recent history of myocardial infarction status post PCI on 11/13/2020  6.  Suspected intermittent atrial fibrillation on apixaban.  7.  Essential hypertension.  8.  Dyslipidemia.    Plan:    Ms. Elise is currently doing better and her renal function is improving with IV hydration.  She does not have any significant LV dysfunction and she does not require lisinopril or Lasix going  forward.  We will avoid nephrotoxic agents due to her chronic kidney disease.  She would benefit from outpatient follow-up with Dr. Rankin regularly.    Patient has had recurrent TIAs and was advised to continue Eliquis and Plavix but to stop aspirin.  I will again reiterate the fact that she needs to discontinue aspirin.  Due to her age and renal failure, she is at high risk for bleeding.    Patient did have abdominal pain and completed a course of Levaquin and Flagyl during her last admission.  She does have diverticulosis.  Due to her advanced age and multiple comorbidities, she may not benefit from outpatient colonoscopy.     We will continue her on physical and occupational therapy.  I discussed short-term rehabilitation option with her.  She does not seem to be interested and wants to go home with home health.  She states that she lives with her son but her daughter takes care of her medical issues.  I discussed the patient's condition and medication regimen with her daughter Brigid over the phone.  Plan is to discharge patient home tomorrow with home health services.  Discussed with nursing staff and multidisciplinary team.      Brandan Cherry MD  11/20/20  13:29 EST    Dictated utilizing Dragon dictation.

## 2020-11-20 NOTE — PLAN OF CARE
Problem: Adult Inpatient Plan of Care  Goal: Plan of Care Review  Recent Flowsheet Documentation  Taken 11/20/2020 1650 by Reggie Shane, PTA  Progress: improving  Plan of Care Reviewed With: patient  Outcome Summary: Pt tolerated treatment well.  Pt performed sit to supine with cga and was able to advance gait distance to 136'cga with rw.  See flowsheet for details.

## 2020-11-20 NOTE — THERAPY TREATMENT NOTE
Patient Name: Tanya Elise  : 1933    MRN: [id number]                              Today's Date: 2020       Admit Date: 2020    Visit Dx:     ICD-10-CM ICD-9-CM   1. Chronic kidney disease, unspecified CKD stage  N18.9 585.9   2. Acute renal failure, unspecified acute renal failure type (CMS/HCC)  N17.9 584.9   3. Vertigo  R42 780.4   4. Non-intractable vomiting with nausea, unspecified vomiting type  R11.2 787.01     Patient Active Problem List   Diagnosis   • Pure hypercholesterolemia   • Essential hypertension   • Temporary cerebral vascular dysfunction   • Esophageal reflux   • BMI 35.0-35.9,adult   • Diarrhea   • TIA (transient ischemic attack)   • Hypertensive urgency   • Chronic kidney disease, stage III (moderate)   • Acute ST elevation myocardial infarction (STEMI) (CMS/HCC)   • ST elevation myocardial infarction (STEMI) (CMS/HCC)   • Coronary artery disease involving native coronary artery of native heart without angina pectoris   • Dyslipidemia   • Transient ischemic attack   • Left lower quadrant abdominal pain   • Acute kidney injury superimposed on chronic kidney disease (CMS/HCC)   • Acute renal failure (ARF) (CMS/HCC)   • Acute metabolic encephalopathy     Past Medical History:   Diagnosis Date   • Acid reflux    • Allergic    • Cataract    • Cerumen impaction    • Dizziness    • High cholesterol    • Hypertension    • Impaired functional mobility, balance, gait, and endurance    • Influenza    • Reflux gastritis    • Seasonal allergies    • Spinal headache    • Stroke (CMS/HCC)    • Transient ischemic attack    • Wears glasses      Past Surgical History:   Procedure Laterality Date   • APPENDECTOMY     • BREAST BIOPSY Left    • CARDIAC CATHETERIZATION N/A 10/13/2020    Procedure: Left Heart Cath;  Surgeon: Travis Franco MD;  Location: [facility] INVASIVE LOCATION;  Service: Cardiovascular;  Laterality: N/A;   • CATARACT EXTRACTION Bilateral    •  SECTION       X2   • COLONOSCOPY     • DILATATION AND CURETTAGE     • EYE CAPSULOTOMY WITH LASER Right 3/20/2017    Procedure: RIGHT EYE CAPSULOTOMY WITH LASER;  Surgeon: Ev Cortez MD;  Location: [facility];  Service:    • HYSTERECTOMY     • NASAL SEPTUM SURGERY     • TOOTH EXTRACTION       General Information     Row Name 11/20/20 1302          OT Time and Intention    Document Type  therapy note (daily note)  -TL     Mode of Treatment  individual therapy  -TL     Row Name 11/20/20 1302          General Information    Patient Profile Reviewed  yes  -TL     Existing Precautions/Restrictions  fall  -TL     Barriers to Rehab  none identified  -TL     Row Name 11/20/20 1302          Cognition    Orientation Status (Cognition)  oriented x 4  -TL     Row Name 11/20/20 1302          Safety Issues, Functional Mobility    Safety Issues Affecting Function (Mobility)  safety precaution awareness;safety precautions follow-through/compliance  -TL     Impairments Affecting Function (Mobility)  endurance/activity tolerance;strength  -TL       User Key  (r) = Recorded By, (t) = Taken By, (c) = Cosigned By    Initials Name Provider Type    TL Tomas, Patricia, OTR Occupational Therapist        Mobility/ADL's     Row Name 11/20/20 1312          Bed Mobility    Bed Mobility  supine-sit  -TL     Supine-Sit Tama (Bed Mobility)  standby assist  -TL     Assistive Device (Bed Mobility)  head of bed elevated  -TL     Row Name 11/20/20 1312          Transfers    Transfers  sit-stand transfer  -TL     Sit-Stand Tama (Transfers)  contact guard  -TL     Row Name 11/20/20 1312          Sit-Stand Transfer    Assistive Device (Sit-Stand Transfers)  walker, front-wheeled  -TL     Row Name 11/20/20 1312          Functional Mobility    Functional Mobility- Ind. Level  contact guard assist  -TL     Functional Mobility- Device  rolling walker  -TL     Functional Mobility-Distance (Feet)  15  -TL     Functional Mobility- Comment  Patient required CGA  with RW from one side of the bed around to a bedside chair.  -TL       User Key  (r) = Recorded By, (t) = Taken By, (c) = Cosigned By    Initials Name Provider Type    Patricia Berger OTR Occupational Therapist        Obj/Interventions    No documentation.       Goals/Plan    No documentation.       Clinical Impression     Row Name 11/20/20 1315          Pain Scale: Numbers Pre/Post-Treatment    Pretreatment Pain Rating  0/10 - no pain  -TL     Posttreatment Pain Rating  0/10 - no pain  -TL     Row Name 11/20/20 1315          Plan of Care Review    Plan of Care Reviewed With  patient  -TL     Progress  improving  -TL     Outcome Summary  Patient was SBA for bed mobility supine to sit, CGA sit to stand, and CGA with RW for functional mobility 15ft from one side of bed around to a bedside chair.  Patient demonstrates increased independence with bed mobility and patient is motivated to participate in therapy.  Continue with OT focusing on increasing ADL independence.  -TL     Row Name 11/20/20 1315          Therapy Assessment/Plan (OT)    Rehab Potential (OT)  good, to achieve stated therapy goals  -TL     Criteria for Skilled Therapeutic Interventions Met (OT)  yes  -TL     Row Name 11/20/20 1315          Vital Signs    Pre SpO2 (%)  98  -TL     O2 Delivery Pre Treatment  room air  -TL     Post SpO2 (%)  96  -TL     O2 Delivery Post Treatment  room air  -TL     Row Name 11/20/20 1315          Positioning and Restraints    Pre-Treatment Position  in bed  -TL     Post Treatment Position  chair  -TL     In Chair  notified nsg;reclined;call light within reach;legs elevated;encouraged to call for assist  -TL       User Key  (r) = Recorded By, (t) = Taken By, (c) = Cosigned By    Initials Name Provider Type    Patricia Berger OTR Occupational Therapist        Outcome Measures     Row Name 11/20/20 1321          How much help from another is currently needed...    Putting on and taking off regular lower body clothing?  3  -TL      Bathing (including washing, rinsing, and drying)  3  -TL     Toileting (which includes using toilet bed pan or urinal)  3  -TL     Putting on and taking off regular upper body clothing  3  -TL     Taking care of personal grooming (such as brushing teeth)  4  -TL     Eating meals  4  -TL     AM-PAC 6 Clicks Score (OT)  20  -TL     Row Name 11/20/20 1321          Functional Assessment    Outcome Measure Options  AM-PAC 6 Clicks Daily Activity (OT)  -TL       User Key  (r) = Recorded By, (t) = Taken By, (c) = Cosigned By    Initials Name Provider Type    TL Patricia Marin OTR Occupational Therapist        Occupational Therapy Education                 Title: PT OT SLP Therapies (Done)     Topic: Occupational Therapy (Done)     Point: ADL training (Done)     Description:   Instruct learner(s) on proper safety adaptation and remediation techniques during self care or transfers.   Instruct in proper use of assistive devices.              Learning Progress Summary           Patient Acceptance, E,D, VU,DU by  at 11/20/2020 1321    Comment: Patient educated regarding safety techniques with functional mobility.    Acceptance, E,TB, VU by  at 11/19/2020 1708    Comment: Role of OT/POC                   Point: Home exercise program (Done)     Description:   Instruct learner(s) on appropriate technique for monitoring, assisting and/or progressing therapeutic exercises/activities.              Learning Progress Summary           Patient Acceptance, E,D, VU,DU by  at 11/20/2020 1321    Comment: Patient educated regarding safety techniques with functional mobility.                   Point: Precautions (Done)     Description:   Instruct learner(s) on prescribed precautions during self-care and functional transfers.              Learning Progress Summary           Patient Acceptance, E,D, VU,DU by  at 11/20/2020 1321    Comment: Patient educated regarding safety techniques with functional mobility.                   Point:  Body mechanics (Done)     Description:   Instruct learner(s) on proper positioning and spine alignment during self-care, functional mobility activities and/or exercises.              Learning Progress Summary           Patient Acceptance, E,D, STELLA,DU by  at 11/20/2020 1321    Comment: Patient educated regarding safety techniques with functional mobility.                               User Key     Initials Effective Dates Name Provider Type Discipline     03/07/18 -  Saida Serrato Occupational Therapist OT     01/21/17 -  Patricia Marin OTR Occupational Therapist OT              OT Recommendation and Plan     Plan of Care Review  Plan of Care Reviewed With: patient  Progress: improving  Outcome Summary: Patient was SBA for bed mobility supine to sit, CGA sit to stand, and CGA with RW for functional mobility 15ft from one side of bed around to a bedside chair.  Patient demonstrates increased independence with bed mobility and patient is motivated to participate in therapy.  Continue with OT focusing on increasing ADL independence.     Time Calculation:   Time Calculation- OT     Row Name 11/20/20 1324             Time Calculation- OT    OT Start Time  1059  -TL      OT Stop Time  1115  -TL      OT Time Calculation (min)  16 min  -TL      OT Received On  11/20/20  -        User Key  (r) = Recorded By, (t) = Taken By, (c) = Cosigned By    Initials Name Provider Type     Patricia Marin OTR Occupational Therapist        Therapy Charges for Today     Code Description Service Date Service Provider Modifiers Qty    [id number]  OT THERAPEUTIC ACT EA 15 MIN 11/20/2020 Patricia Marin OTR GO 1               SARA Allen  11/20/2020

## 2020-11-20 NOTE — PLAN OF CARE
Goal Outcome Evaluation:  Plan of Care Reviewed With: patient  Progress: improving  Outcome Summary: VSS. Patient up in chair at bedside. Continue to monitor kidney function, No complaints this shift.

## 2020-11-20 NOTE — THERAPY TREATMENT NOTE
Patient Name: Tanya Elise  : 1933    MRN: [id number]                              Today's Date: 2020       Admit Date: 2020    Visit Dx:     ICD-10-CM ICD-9-CM   1. Chronic kidney disease, unspecified CKD stage  N18.9 585.9   2. Acute renal failure, unspecified acute renal failure type (CMS/HCC)  N17.9 584.9   3. Vertigo  R42 780.4   4. Non-intractable vomiting with nausea, unspecified vomiting type  R11.2 787.01     Patient Active Problem List   Diagnosis   • Pure hypercholesterolemia   • Essential hypertension   • Temporary cerebral vascular dysfunction   • Esophageal reflux   • BMI 35.0-35.9,adult   • Diarrhea   • TIA (transient ischemic attack)   • Hypertensive urgency   • Chronic kidney disease, stage III (moderate)   • Acute ST elevation myocardial infarction (STEMI) (CMS/HCC)   • ST elevation myocardial infarction (STEMI) (CMS/HCC)   • Coronary artery disease involving native coronary artery of native heart without angina pectoris   • Dyslipidemia   • Transient ischemic attack   • Left lower quadrant abdominal pain   • Acute kidney injury superimposed on chronic kidney disease (CMS/HCC)   • Acute renal failure (ARF) (CMS/HCC)   • Acute metabolic encephalopathy     Past Medical History:   Diagnosis Date   • Acid reflux    • Allergic    • Cataract    • Cerumen impaction    • Dizziness    • High cholesterol    • Hypertension    • Impaired functional mobility, balance, gait, and endurance    • Influenza    • Reflux gastritis    • Seasonal allergies    • Spinal headache    • Stroke (CMS/HCC)    • Transient ischemic attack    • Wears glasses      Past Surgical History:   Procedure Laterality Date   • APPENDECTOMY     • BREAST BIOPSY Left    • CARDIAC CATHETERIZATION N/A 10/13/2020    Procedure: Left Heart Cath;  Surgeon: Travis Franco MD;  Location: [facility] INVASIVE LOCATION;  Service: Cardiovascular;  Laterality: N/A;   • CATARACT EXTRACTION Bilateral    •  SECTION       X2   • COLONOSCOPY     • DILATATION AND CURETTAGE     • EYE CAPSULOTOMY WITH LASER Right 3/20/2017    Procedure: RIGHT EYE CAPSULOTOMY WITH LASER;  Surgeon: Ev Cortez MD;  Location: [facility];  Service:    • HYSTERECTOMY     • NASAL SEPTUM SURGERY     • TOOTH EXTRACTION       General Information     Row Name 11/20/20 1645          Physical Therapy Time and Intention    Document Type  therapy note (daily note)  -RM     Mode of Treatment  physical therapy  -RM     Row Name 11/20/20 1645          General Information    Patient Profile Reviewed  yes  -RM     Existing Precautions/Restrictions  fall  -RM     Row Name 11/20/20 1645          Cognition    Orientation Status (Cognition)  oriented x 4  -RM     Row Name 11/20/20 1645          Safety Issues, Functional Mobility    Safety Issues Affecting Function (Mobility)  safety precaution awareness  -RM     Impairments Affecting Function (Mobility)  endurance/activity tolerance;strength  -RM       User Key  (r) = Recorded By, (t) = Taken By, (c) = Cosigned By    Initials Name Provider Type    RM Reggie Shane, PTA Physical Therapy Assistant        Mobility     Row Name 11/20/20 1647          Bed Mobility    Sit-Supine Douglas (Bed Mobility)  contact guard;verbal cues  -RM     Assistive Device (Bed Mobility)  bed rails  -RM     Row Name 11/20/20 1647          Sit-Stand Transfer    Sit-Stand Douglas (Transfers)  contact guard;standby assist;verbal cues  -RM     Assistive Device (Sit-Stand Transfers)  walker, front-wheeled  -RM     Row Name 11/20/20 1647          Gait/Stairs (Locomotion)    Douglas Level (Gait)  contact guard;verbal cues;standby assist  -RM     Assistive Device (Gait)  walker, front-wheeled  -RM     Distance in Feet (Gait)  136  -RM     Deviations/Abnormal Patterns (Gait)  weight shifting decreased;gait speed decreased;stride length decreased  -RM     Bilateral Gait Deviations  forward flexed posture  -RM       User Key  (r) = Recorded  By, (t) = Taken By, (c) = Cosigned By    Initials Name Provider Type     Reggie Shane, VINCENT Physical Therapy Assistant        Obj/Interventions    No documentation.       Goals/Plan    No documentation.       Clinical Impression     Row Name 11/20/20 1650          Pain Scale: Numbers Pre/Post-Treatment    Pretreatment Pain Rating  5/10  -RM     Posttreatment Pain Rating  4/10  -RM     Pain Location - Orientation  medial  -RM     Pain Location  back  -RM     Pain Intervention(s)  Repositioned;Ambulation/increased activity  -RM     Row Name 11/20/20 1650          Plan of Care Review    Plan of Care Reviewed With  patient  -RM     Progress  improving  -RM     Outcome Summary  Pt tolerated treatment well.  Pt performed sit to supine with cga and was able to advance gait distance to 136'cga with rw.  See flowsheet for details.  -RM     Row Name 11/20/20 1650          Vital Signs    Pre SpO2 (%)  96  -RM     O2 Delivery Pre Treatment  room air  -RM     Intra SpO2 (%)  95  -RM     O2 Delivery Intra Treatment  room air  -RM     Post SpO2 (%)  99  -RM     O2 Delivery Post Treatment  room air  -RM     Pre Patient Position  Sitting  -RM     Intra Patient Position  Standing  -RM     Post Patient Position  Supine  -RM     Row Name 11/20/20 1650          Positioning and Restraints    Pre-Treatment Position  sitting in chair/recliner  -RM     Post Treatment Position  bed  -RM     In Bed  supine;call light within reach;encouraged to call for assist;notified nsg;exit alarm on  -RM       User Key  (r) = Recorded By, (t) = Taken By, (c) = Cosigned By    Initials Name Provider Type     Reggie Shane, VINCENT Physical Therapy Assistant        Outcome Measures     Row Name 11/20/20 1658          How much help from another person do you currently need...    Turning from your back to your side while in flat bed without using bedrails?  4  -RM     Moving from lying on back to sitting on the side of a flat bed without bedrails?  4   -RM     Moving to and from a bed to a chair (including a wheelchair)?  3  -RM     Standing up from a chair using your arms (e.g., wheelchair, bedside chair)?  4  -RM     Climbing 3-5 steps with a railing?  3  -RM     To walk in hospital room?  3  -RM     AM-PAC 6 Clicks Score (PT)  21  -RM     Row Name 11/20/20 1658          Functional Assessment    Outcome Measure Options  AM-PAC 6 Clicks Basic Mobility (PT)  -RM       User Key  (r) = Recorded By, (t) = Taken By, (c) = Cosigned By    Initials Name Provider Type     Reggie Shane, PTA Physical Therapy Assistant        Physical Therapy Education                 Title: PT OT SLP Therapies (In Progress)     Topic: Physical Therapy (In Progress)     Point: Mobility training (Not Started)     Learner Progress:  Not documented in this visit.          Point: Home exercise program (Not Started)     Learner Progress:  Not documented in this visit.          Point: Body mechanics (Done)     Learning Progress Summary           Patient Acceptance, E,TB,D, VU,NR by  at 11/20/2020 0929    Comment: Posture with mobility                   Point: Precautions (Not Started)     Learner Progress:  Not documented in this visit.                      User Key     Initials Effective Dates Name Provider Type Discipline     03/07/18 -  Reggie Shane, PTA Physical Therapy Assistant PT              PT Recommendation and Plan     Plan of Care Reviewed With: patient  Progress: improving  Outcome Summary: Pt tolerated treatment well.  Pt performed sit to supine with cga and was able to advance gait distance to 136'cga with rw.  See flowsheet for details.     Time Calculation:   PT Charges     Row Name 11/20/20 1700             Time Calculation    Start Time  1506  -RM      Stop Time  1536  -RM      Time Calculation (min)  30 min  -RM      PT Received On  11/20/20  -RM      PT Goal Re-Cert Due Date  11/29/20  -RM         Time Calculation- PT    Total Timed Code Minutes- PT  30  minute(s)  -RM         Timed Charges    31046 - Gait Training Minutes   20  -RM      12764 - PT Therapeutic Activity Minutes  10  -RM        User Key  (r) = Recorded By, (t) = Taken By, (c) = Cosigned By    Initials Name Provider Type    Reggie Arellano, VINCENT Physical Therapy Assistant        Therapy Charges for Today     Code Description Service Date Service Provider Modifiers Qty    24597027062 HC GAIT TRAINING EA 15 MIN 11/20/2020 Reggie Shane, PTA GP 1    18415061601 HC PT THERAPEUTIC ACT EA 15 MIN 11/20/2020 Reggie Shane, PTA GP 1          PT G-Codes  Outcome Measure Options: AM-PAC 6 Clicks Basic Mobility (PT)  AM-PAC 6 Clicks Score (PT): 21  AM-PAC 6 Clicks Score (OT): 20    Reggie Shane PTA  11/20/2020

## 2020-11-20 NOTE — PROGRESS NOTES
Continued Stay Note  [facility]     Patient Name: Tanya Elise  MRN: [id number]  Today's Date: 11/20/2020    Admit Date: 11/18/2020    Discharge Plan     Row Name 11/20/20 1419       Plan    Plan  Spoke to patient , son and daughter regarding home health. Provided preference list. Family is deciding on an agency. Notified weekend CM.        Discharge Codes    No documentation.       Expected Discharge Date and Time     Expected Discharge Date Expected Discharge Time    Nov 22, 2020             Ranjana Benjamin LCSW

## 2020-11-20 NOTE — PLAN OF CARE
Problem: Adult Inpatient Plan of Care  Goal: Plan of Care Review  Outcome: Ongoing, Progressing  Flowsheets (Taken 11/20/2020 1315)  Progress: improving  Plan of Care Reviewed With: patient  Outcome Summary: Patient was SBA for bed mobility supine to sit, CGA sit to stand, and CGA with RW for functional mobility 15ft from one side of bed around to a bedside chair.  Patient demonstrates increased independence with bed mobility and patient is motivated to participate in therapy.  Continue with OT focusing on increasing ADL independence.

## 2020-11-21 ENCOUNTER — READMISSION MANAGEMENT (OUTPATIENT)
Dept: CALL CENTER | Facility: HOSPITAL | Age: 85
End: 2020-11-21

## 2020-11-21 VITALS
RESPIRATION RATE: 18 BRPM | OXYGEN SATURATION: 96 % | DIASTOLIC BLOOD PRESSURE: 45 MMHG | TEMPERATURE: 97.8 F | WEIGHT: 190.7 LBS | HEIGHT: 62 IN | SYSTOLIC BLOOD PRESSURE: 133 MMHG | HEART RATE: 66 BPM | BODY MASS INDEX: 35.09 KG/M2

## 2020-11-21 LAB
ANION GAP SERPL CALCULATED.3IONS-SCNC: 5.6 MMOL/L (ref 5–15)
BUN SERPL-MCNC: 21 MG/DL (ref 8–23)
BUN/CREAT SERPL: 14.8 (ref 7–25)
CALCIUM SPEC-SCNC: 8.3 MG/DL (ref 8.6–10.5)
CHLORIDE SERPL-SCNC: 117 MMOL/L (ref 98–107)
CO2 SERPL-SCNC: 20.4 MMOL/L (ref 22–29)
CREAT SERPL-MCNC: 1.42 MG/DL (ref 0.57–1)
DEPRECATED RDW RBC AUTO: 47.9 FL (ref 37–54)
ERYTHROCYTE [DISTWIDTH] IN BLOOD BY AUTOMATED COUNT: 13.1 % (ref 12.3–15.4)
GFR SERPL CREATININE-BSD FRML MDRD: 35 ML/MIN/1.73
GLUCOSE SERPL-MCNC: 110 MG/DL (ref 65–99)
HCT VFR BLD AUTO: 26.7 % (ref 34–46.6)
HGB BLD-MCNC: 8.5 G/DL (ref 12–15.9)
MCH RBC QN AUTO: 32 PG (ref 26.6–33)
MCHC RBC AUTO-ENTMCNC: 31.8 G/DL (ref 31.5–35.7)
MCV RBC AUTO: 100.4 FL (ref 79–97)
PLATELET # BLD AUTO: 193 10*3/MM3 (ref 140–450)
PMV BLD AUTO: 11.6 FL (ref 6–12)
POTASSIUM SERPL-SCNC: 4.1 MMOL/L (ref 3.5–5.2)
RBC # BLD AUTO: 2.66 10*6/MM3 (ref 3.77–5.28)
SODIUM SERPL-SCNC: 143 MMOL/L (ref 136–145)
WBC # BLD AUTO: 6.21 10*3/MM3 (ref 3.4–10.8)

## 2020-11-21 PROCEDURE — 97110 THERAPEUTIC EXERCISES: CPT

## 2020-11-21 PROCEDURE — 99239 HOSP IP/OBS DSCHRG MGMT >30: CPT | Performed by: INTERNAL MEDICINE

## 2020-11-21 PROCEDURE — 85027 COMPLETE CBC AUTOMATED: CPT | Performed by: INTERNAL MEDICINE

## 2020-11-21 PROCEDURE — 80048 BASIC METABOLIC PNL TOTAL CA: CPT | Performed by: INTERNAL MEDICINE

## 2020-11-21 RX ORDER — BACLOFEN 10 MG/1
5 TABLET ORAL 2 TIMES DAILY PRN
Start: 2020-11-21 | End: 2021-09-10

## 2020-11-21 RX ADMIN — CARVEDILOL 3.12 MG: 3.12 TABLET, FILM COATED ORAL at 08:57

## 2020-11-21 RX ADMIN — Medication 1 CAPSULE: at 08:57

## 2020-11-21 RX ADMIN — CLOPIDOGREL BISULFATE 75 MG: 75 TABLET ORAL at 08:57

## 2020-11-21 RX ADMIN — SODIUM CHLORIDE, PRESERVATIVE FREE 10 ML: 5 INJECTION INTRAVENOUS at 08:57

## 2020-11-21 RX ADMIN — APIXABAN 2.5 MG: 2.5 TABLET, FILM COATED ORAL at 08:57

## 2020-11-21 NOTE — PROGRESS NOTES
Discharge Planning Assessment  [facility]     Patient Name: Tanya Elise  MRN: [id number]  Today's Date: 11/21/2020    Admit Date: 11/18/2020    Discharge Needs Assessment    No documentation.       Discharge Plan     Row Name 11/21/20 1210       Plan    Plan  Orders for HH services for skilled nursing and PT/OT at discharge noted  Pt's son, Slade Kuhn was given options from Ranjana REYNOLDS  and he still hasn't decided on a HH agency   He is here to take his mother home and will call and give us his decision on a HH agency sometime this weekend   No further needs noted at this time  Pt's daughter, Mariangel Streeter  has CM number to call when she and her brother have selected a HH agency  Slade Elise, pt's son called and stated he has decided on [facility] services for his mother   Spoke with Latricia about the referral and faxed face sheet, orders,PT notes and discharge summary with success        Continued Care and Services - Admitted Since 11/18/2020    Coordination has not been started for this encounter.       Expected Discharge Date and Time     Expected Discharge Date Expected Discharge Time    Nov 21, 2020         Demographic Summary    No documentation.       Functional Status    No documentation.       Psychosocial    No documentation.       Abuse/Neglect    No documentation.       Legal    No documentation.       Substance Abuse    No documentation.       Patient Forms    No documentation.           Mariangel Shah RN

## 2020-11-21 NOTE — DISCHARGE SUMMARY
[facility]   DISCHARGE SUMMARY      Name:  Tanya Elise   Age:  87 y.o.  Sex:  female  :  1933  MRN:  [id number]   Visit Number:  [id number]    Admission Date:  2020  Date of Discharge:  2020  Primary Care Physician:  Aileen Grande APRN    Important issues to note:    1.  Discontinue aspirin and continue Eliquis and Plavix.  2.  Lasix and lisinopril have been discontinued due to her acute on chronic kidney disease.  3.  Patient will be discharged with home health services.  4.  Follow-up with Dr. Rankin for chronic kidney disease as scheduled.  5.  Follow-up with Dr. Franco as scheduled.  6.  Follow-up with primary care provider in 1 week.    Discharge Diagnoses:     1.  Acute metabolic encephalopathy secondary to #2 with myoclonic jerks and dizziness, POA.  2.  Acute renal failure secondary to dehydration and use of diuretics, POA.  3.  Chronic kidney disease stage III.  4.  Recent history of sigmoid diverticulitis on antibiotics therapy.  5.  Recent history of myocardial infarction status post PCI on 2020  6.  Suspected intermittent atrial fibrillation on apixaban.  7.  Essential hypertension.  8.  Dyslipidemia.    Problem List:       Acute renal failure (ARF) (CMS/HCC)    Essential hypertension    BMI 35.0-35.9,adult    Chronic kidney disease, stage III (moderate)    Coronary artery disease involving native coronary artery of native heart without angina pectoris    Dyslipidemia    Acute metabolic encephalopathy    Presenting Problem:    Acute renal failure (ARF) (CMS/HCC) [N17.9]     Consults:     Consulting Physician(s)             None          Procedures Performed:    None.    History of presenting illness:    The patient is an 87-year-old lady with past medical history of TIA, history of stroke, coronary artery disease with recent stent, debility, who presented to the emergency room for nausea and vomiting with dizziness with ambulation.  " She denied facial droop or acute weakness.  Patient stated having progressively worsening nausea and dizziness since awakening yesterday at home.  She states she felt like she had vertigo.  Every time she moves her head she felt \"seasick\".  She was evaluated in the ER.  There was no nystagmus.  CT scan of the head showed no acute findings.  She was given an aspirin 325 mg already today.  His CT scan showed old microvascular changes.  Lab work showed her creatinine was elevated to 3.41.  Her baseline creatinine appears to be 1.6.       She was recently discharged post treatment of TIA who underwent extensive neurologic work-up and testing.  She had been placed on apixaban for suspected atrial fibrillation.  She was seen by neurology Dr Mohsen.  MRI of the brain without contrast showed small area of suggested ischemia of the right frontoparietal region with chronic microvascular ischemia.  MRI angiogram of the head showed unremarkable.  MRI angiogram of the neck showed no findings to suggest hemodynamically significant carotid or vertebral artery stenosis.  She also was treated for a mild sigmoid diverticulitis and placed on Levaquin and Flagyl.  Patient had started to ambulate in the hallway.  She was in eating and drinking some.  She had wanted to go home to see if she could eat and drink more of her own food.  She states that she went home and had progressive worsening intake however she has no further abdominal pain.  She denied diarrhea.  She states she has only chronic constipation.  On discharge, she was continued on Eliquis 2.5 mg twice daily, Levaquin, Flagyl, Zofran.  She was continued on her lisinopril and furosemide.  She admits to not eating or drinking well since discharge and continue to take these medications.  With acute renal failure and nausea and vomiting again with vertigo symptoms, the patient was admitted for further evaluation and treatment.  The patient admits to frustration with her " progressive functional decline over the past couple months.     In ER, she was given zofran, meclizine, and 1 liter NS bolus. Of note, the patient also had an ST elevation myocardial infarction last month and underwent cardiac stenting by Dr. Franco.  She denies any chest pain, fever, shortness of breath, cough.    Hospital Course:    Ms. Elise was admitted to the medical floor with telemetry.  She was placed on IV fluids.  Her Lasix and lisinopril were discontinued due to her renal failure.  She was noted to have generalized weakness.  She was noted to have myoclonic jerks and asterixis which was thought to be secondary to uremia and uremic encephalopathy.  Once her renal function and BUN levels improved, she did not have further episodes of tremors/myoclonic jerks and was able to walk with physical therapy.  There was no evidence of TIA or cerebrovascular accident during this admission.    Patient was again advised to discontinue aspirin and continue Plavix and Eliquis as per our previous conversation during last admission.  I discussed this again with her primary cardiologist Dr. Franco and he was agreeable to discontinue aspirin.  Patient had recently been to Dr. Franco's office for placement of a event monitor that she wore for about 4 days and return date.  She does not know the results of the monitoring and will be scheduled to follow-up with Dr. Franco.    Her renal function has significantly improved and is back to her baseline.  She does have chronic kidney disease and will be scheduled to follow-up with Dr. Be as an outpatient.  We will continue to hold her lisinopril and Lasix especially since her blood pressure is in the low normal range.    Patient does have history of diverticulitis and was recently treated with antibiotics course.  She states her abdominal pain has significantly improved.  Due to her age, she may not have any further benefits on repeat colonoscopy at this time.   Patient is currently feeling better and is eager to go home.  She declined short-term rehabilitation at this time.  We will arrange home health services.  She is advised to follow-up with her primary care physician in 1 week.  I tried to call the patient's son Slade but did not get any answer.  I did talk to the patient's daughter Mariangel yesterday and explained her the patient's discharge plans.    Vital Signs:    Temp:  [97.8 °F (36.6 °C)-98 °F (36.7 °C)] 98 °F (36.7 °C)  Heart Rate:  [65-84] 84  Resp:  [18-20] 18  BP: (121-145)/(42-88) 145/50    Physical Exam:    General Appearance:  Alert and cooperative, not in any acute distress.   Head:  Atraumatic and normocephalic, without obvious abnormality.   Eyes:          Conjunctivae and sclerae normal, no icterus. No pallor. Extraocular movements are within normal limits.  No nystagmus.   Ears:  Ears appear intact with no abnormalities noted.   Throat: No oral lesions, no thrush, oral mucosa moist.   Neck: Supple, trachea midline, no thyromegaly, no carotid bruit.   Back:   No kyphoscoliosis present. No tenderness to palpation,   range of motion normal.   Lungs:   Chest shape is normal. Breath sounds heard bilaterally equally.  No crackles or wheezing. No Pleural rub or bronchial breathing.   Heart:  Normal S1 and S2, no murmur, no gallop, no rub. No JVD.   Abdomen:   Normal bowel sounds, no masses, no organomegaly. Soft, nontender, nondistended, no guarding, no rebound tenderness.   Extremities: Moves all extremities, no edema, no cyanosis, no clubbing.   Pulses: Pulses palpable and equal bilaterally.   Skin: No bleeding, bruising or rash.   Neurologic: Alert and oriented x 3. Moves all four limbs equally. No tremors. No facial asymmetry.  Hand  is strong bilaterally.     Pertinent Lab Results:     Results from last 7 days   Lab Units 11/21/20  0629 11/20/20  0627 11/19/20  0606 11/18/20  1759   SODIUM mmol/L 143 143 142 136   POTASSIUM mmol/L 4.1 4.1 3.9 4.8    CHLORIDE mmol/L 117* 116* 113* 102   CO2 mmol/L 20.4* 19.3* 19.5* 20.1*   BUN mg/dL 21 35* 40* 44*   CREATININE mg/dL 1.42* 2.07* 2.88* 3.41*   CALCIUM mg/dL 8.3* 8.0* 8.4* 9.3   BILIRUBIN mg/dL  --   --   --  0.2   ALK PHOS U/L  --   --   --  45   ALT (SGPT) U/L  --   --   --  12   AST (SGOT) U/L  --   --   --  21   GLUCOSE mg/dL 110* 99 90 131*     Results from last 7 days   Lab Units 11/21/20  0629 11/20/20  0627 11/19/20  0606   WBC 10*3/mm3 6.21 6.03 6.61   HEMOGLOBIN g/dL 8.5* 8.3* 8.3*   HEMATOCRIT % 26.7* 26.3* 25.7*   PLATELETS 10*3/mm3 193 194 196         Results from last 7 days   Lab Units 11/18/20  1759   TROPONIN T ng/mL <0.010             Results from last 7 days   Lab Units 11/18/20  1759   LIPASE U/L 31         Results from last 7 days   Lab Units 11/18/20  1949   COLOR UA  Yellow   GLUCOSE UA  Negative   KETONES UA  Negative   LEUKOCYTES UA  Trace*   PH, URINE  <=5.0   BILIRUBIN UA  Negative   UROBILINOGEN UA  0.2 E.U./dL     Pertinent Radiology Results:    Imaging Results (All)     Procedure Component Value Units Date/Time    CT Head Without Contrast [[id number]] Collected: 11/18/20 1810     Updated: 11/18/20 1811    Narrative:      FINAL REPORT    TECHNIQUE:  Routine axial images through the head were obtained without  contrast.    CLINICAL HISTORY:  Vomiting, lightheaded    FINDINGS:  The ventricles are dilated, proportionate to the degree of  generalized atrophy.  Periventricular and deep white matter  low-attenuation areas are consistent with chronic small vessel  ischemia.  There is no mass or shift in midline structures.  There is no intracranial hemorrhage.  There is no acute sinus or  osseous abnormality.      Impression:      No acute intracranial abnormality.    Authenticated by Jean Carlos Martin M.D. on 11/18/2020 06:10:48 PM        Echo:    Results for orders placed during the hospital encounter of 11/12/20   Adult Transthoracic Echo Complete W/ Cont if Necessary Per Protocol    Narrative ·  Estimated left ventricular EF = 65% Left ventricular ejection fraction   appears to be 61 - 65%. Left ventricular systolic function is normal.  · Left ventricular diastolic function is consistent with age.        Condition on Discharge:      Stable.    Code status during the hospital stay:    Code Status and Medical Interventions:   Ordered at: 11/18/20 5263     Level Of Support Discussed With:    Patient     Code Status:    CPR     Medical Interventions (Level of Support Prior to Arrest):    Full     Discharge Dis[place] [facility] [place] Medications:       Discharge Medications      Continue These Medications      Instructions Start Date   amLODIPine 5 MG tablet  Commonly known as: NORVASC   5 mg, Oral, Nightly      apixaban 2.5 MG tablet tablet  Commonly known as: ELIQUIS   2.5 mg, Oral, Every 12 Hours Scheduled      baclofen 10 MG tablet  Commonly known as: LIORESAL   5 mg, Oral, 2 Times Daily PRN      carbamide peroxide 6.5 % otic solution  Commonly known as: Debrox   5 drops, Both Ears, As Needed      carvedilol 3.125 MG tablet  Commonly known as: COREG   3.125 mg, Oral, 2 Times Daily With Meals      clopidogrel 75 MG tablet  Commonly known as: PLAVIX   75 mg, Oral, Daily      fexofenadine 60 MG tablet  Commonly known as: ALLEGRA   60 mg, Oral, Daily PRN      fluticasone 50 MCG/ACT nasal spray  Commonly known as: Flonase   1 spray, Nasal, Daily      ondansetron 4 MG tablet  Commonly known as: ZOFRAN   4 mg, Oral, Every 6 Hours PRN      pravastatin 40 MG tablet  Commonly known as: Pravachol   40 mg, Oral, Nightly, For cholesterol.      Vitamin D (Cholecalciferol) 10 MCG (400 UNIT) tablet  Commonly known as: CHOLECALCIFEROL   400 Units, Oral, Daily         Stop These Medications    aspirin  MG tablet     furosemide 40 MG tablet  Commonly known as: LASIX     levoFLOXacin 250 MG tablet  Commonly known as: LEVAQUIN     lisinopril 20 MG tablet  Commonly known as: PRINIVIL,ZESTRIL      metroNIDAZOLE 500 MG tablet  Commonly known as: FLAGYL          Discharge Diet:     Diet Instructions     Diet: Cardiac, Renal; Thin      Discharge Diet:  Cardiac  Renal       Fluid Consistency: Thin        Activity at Discharge:     Activity Instructions     Activity as Tolerated          Follow-up Appointments:    Additional Instructions for the Follow-ups that You Need to Schedule     Ambulatory Referral to Home Health   As directed      Face to Face Visit Date: 11/21/2020    Follow-up provider for Plan of Care?: I treated the patient in an acute care facility and will not continue treatment after discharge.    Follow-up provider: AILEEN MCKEON [[id number]]    Reason/Clinical Findings: ARF, HTN, CKD, h/o CVA    Describe mobility limitations that make leaving home difficult: Generalized weakness, Dizziness    Nursing/Therapeutic Services Requested: Skilled Nursing Physical Therapy Occupational Therapy    Skilled nursing orders: Medication education Cardiopulmonary assessments    PT orders: Therapeutic exercise Gait Training Transfer training Strengthening    Weight Bearing Status: As Tolerated    Occupational orders: Activities of daily living Strengthening    Frequency: 1 Week 1           Follow-up Information     Travis Franco MD Follow up in 3 week(s).    Specialties: Cardiology, Interventional Radiology  Why: Followe up Dec 7th @ 1:00 pm   Contact information:  [address]  [address]  [facility] [address]  [phone]             Juancarlos Be MD, IANN Follow up in 2 week(s).    Specialties: Nephrology, Hospitalist  Why: Follow up appt Tues Dec 4th @ 9:20 am   Contact information:  [address] [place]  [facility] [address]  [phone]             Aileen Mckeon, ESTEPHANIE Follow up in 1 week(s).    Specialties: Family Medicine, Nurse Practitioner, Family Medicine  Why: with BMP    Follow up appt Tues Nov 24th @ 1:00 pm   Contact information:  [address]  [facility] [address]  [phone]                   Future Appointments   Date Time Provider Department Center   11/24/2020  1:00 PM Aileen Grande APRN [facility] MR DES   12/7/2020  1:00 PM Travis Franco MD [facility] R None   12/22/2020  1:30 PM Reena Castaneda PA [facility] MR DES   4/28/2021  9:15 AM Travis Franco MD [facility] R None     Test Results Pending at Discharge:    None.       Brandan Cherry MD  11/21/20  11:22 EST    Time: I spent 35 minutes on this discharge activity which included: face-to-face encounter with the patient, reviewing the data in the system, coordination of the care with the nursing staff as well as consultants, documentation, and entering orders.     Dictated utilizing Dragon dictation.

## 2020-11-21 NOTE — PLAN OF CARE
Problem: Adult Inpatient Plan of Care  Goal: Plan of Care Review  Recent Flowsheet Documentation  Taken 11/21/2020 1055 by Teena Pereira, PTA  Progress: improving  Plan of Care Reviewed With: patient  Outcome Summary: Patient has improved amb endurance this date with ability to amb 180ft with RWx SBA.

## 2020-11-21 NOTE — THERAPY TREATMENT NOTE
Patient Name: Tanya Elise  : 1933    MRN: [id number]                              Today's Date: 2020       Admit Date: 2020    Visit Dx:     ICD-10-CM ICD-9-CM   1. Acute renal failure, unspecified acute renal failure type (CMS/HCC)  N17.9 584.9   2. Chronic kidney disease, unspecified CKD stage  N18.9 585.9   3. Vertigo  R42 780.4   4. Non-intractable vomiting with nausea, unspecified vomiting type  R11.2 787.01   5. Back muscle spasm  M62.830 724.8   6. Essential hypertension  I10 401.9   7. Stage 3a chronic kidney disease  N18.31 585.3   8. Coronary artery disease involving native coronary artery of native heart without angina pectoris  I25.10 414.01     Patient Active Problem List   Diagnosis   • Pure hypercholesterolemia   • Essential hypertension   • Temporary cerebral vascular dysfunction   • Esophageal reflux   • BMI 35.0-35.9,adult   • Diarrhea   • TIA (transient ischemic attack)   • Hypertensive urgency   • Chronic kidney disease, stage III (moderate)   • Acute ST elevation myocardial infarction (STEMI) (CMS/HCC)   • ST elevation myocardial infarction (STEMI) (CMS/HCC)   • Coronary artery disease involving native coronary artery of native heart without angina pectoris   • Dyslipidemia   • Transient ischemic attack   • Left lower quadrant abdominal pain   • Acute kidney injury superimposed on chronic kidney disease (CMS/HCC)   • Acute renal failure (ARF) (CMS/HCC)   • Acute metabolic encephalopathy     Past Medical History:   Diagnosis Date   • Acid reflux    • Allergic    • Cataract    • Cerumen impaction    • Dizziness    • High cholesterol    • Hypertension    • Impaired functional mobility, balance, gait, and endurance    • Influenza    • Reflux gastritis    • Seasonal allergies    • Spinal headache    • Stroke (CMS/HCC)    • Transient ischemic attack    • Wears glasses      Past Surgical History:   Procedure Laterality Date   • APPENDECTOMY     • BREAST BIOPSY Left    •  CARDIAC CATHETERIZATION N/A 10/13/2020    Procedure: Left Heart Cath;  Surgeon: Travis Franco MD;  Location: [facility] CATH INVASIVE LOCATION;  Service: Cardiovascular;  Laterality: N/A;   • CATARACT EXTRACTION Bilateral    •  SECTION      X2   • COLONOSCOPY     • DILATATION AND CURETTAGE     • EYE CAPSULOTOMY WITH LASER Right 3/20/2017    Procedure: RIGHT EYE CAPSULOTOMY WITH LASER;  Surgeon: Ev Cortez MD;  Location: [facility] OR;  Service:    • HYSTERECTOMY     • NASAL SEPTUM SURGERY     • TOOTH EXTRACTION       General Information     Row Name 20 1055          Physical Therapy Time and Intention    Document Type  therapy note (daily note)  -MR     Mode of Treatment  physical therapy  -MR     Row Name 20 1055          Cognition    Orientation Status (Cognition)  oriented x 4  -MR     Row Name 20 1055          Safety Issues, Functional Mobility    Impairments Affecting Function (Mobility)  endurance/activity tolerance  -MR       User Key  (r) = Recorded By, (t) = Taken By, (c) = Cosigned By    Initials Name Provider Type     Teena Pereira PTA Physical Therapy Assistant        Mobility     Row Name 20 1055          Sit-Stand Transfer    Sit-Stand Davis (Transfers)  minimum assist (75% patient effort);verbal cues  -MR     Assistive Device (Sit-Stand Transfers)  walker, front-wheeled  -MR     Row Name 20 1055          Gait/Stairs (Locomotion)    Davis Level (Gait)  standby assist  -MR     Assistive Device (Gait)  walker, front-wheeled  -MR     Distance in Feet (Gait)  180ft.  -MR     Deviations/Abnormal Patterns (Gait)  connie decreased  -MR       User Key  (r) = Recorded By, (t) = Taken By, (c) = Cosigned By    Initials Name Provider Type     RuslanelTeena PTA Physical Therapy Assistant        Obj/Interventions     Row Name 20 1055          Balance    Balance Assessment  standing dynamic balance  -MR     Dynamic Standing Balance  WNL  -MR        User Key  (r) = Recorded By, (t) = Taken By, (c) = Cosigned By    Initials Name Provider Type    Teena Valerio PTA Physical Therapy Assistant        Goals/Plan    No documentation.       Clinical Impression     Row Name 11/21/20 1055          Pain Scale: Numbers Pre/Post-Treatment    Pretreatment Pain Rating  0/10 - no pain  -MR     Posttreatment Pain Rating  0/10 - no pain  -MR     Row Name 11/21/20 1055          Plan of Care Review    Plan of Care Reviewed With  patient  -MR     Progress  improving  -MR     Outcome Summary  Patient has improved amb endurance this date with ability to amb 180ft with RWx SBA.  -MR     Row Name 11/21/20 1055          Vital Signs    O2 Delivery Pre Treatment  room air  -MR     O2 Delivery Post Treatment  room air  -MR     Row Name 11/21/20 1055          Positioning and Restraints    Pre-Treatment Position  sitting in chair/recliner  -MR     Post Treatment Position  chair  -MR     In Chair  reclined;call light within reach;encouraged to call for assist  -MR       User Key  (r) = Recorded By, (t) = Taken By, (c) = Cosigned By    Initials Name Provider Type    Teena Valerio PTA Physical Therapy Assistant        Outcome Measures     Row Name 11/21/20 1055          How much help from another person do you currently need...    Turning from your back to your side while in flat bed without using bedrails?  4  -MR     Moving from lying on back to sitting on the side of a flat bed without bedrails?  4  -MR     Moving to and from a bed to a chair (including a wheelchair)?  4  -MR     Standing up from a chair using your arms (e.g., wheelchair, bedside chair)?  4  -MR     Climbing 3-5 steps with a railing?  3  -MR     To walk in hospital room?  4  -MR     AM-PAC 6 Clicks Score (PT)  23  -MR       User Key  (r) = Recorded By, (t) = Taken By, (c) = Cosigned By    Initials Name Provider Type    Teena Valerio PTA Physical Therapy Assistant        Physical Therapy Education                  Title: PT OT SLP Therapies (In Progress)     Topic: Physical Therapy (In Progress)     Point: Mobility training (Done)     Learning Progress Summary           Patient Acceptance, E,TB, VU by MR at 11/21/2020 1055                   Point: Home exercise program (Not Started)     Learner Progress:  Not documented in this visit.          Point: Body mechanics (Done)     Learning Progress Summary           Patient Acceptance, E,TB, VU by MR at 11/21/2020 1055    Acceptance, E,TB,D, VU,NR by  at 11/20/2020 1659    Comment: Posture with mobility                   Point: Precautions (Not Started)     Learner Progress:  Not documented in this visit.                      User Key     Initials Effective Dates Name Provider Type Discipline     03/07/18 -  Reggie Shane PTA Physical Therapy Assistant PT     10/15/19 -  Teena Pereira PTA Physical Therapy Assistant PT              PT Recommendation and Plan     Plan of Care Reviewed With: patient  Progress: improving  Outcome Summary: Patient has improved amb endurance this date with ability to amb 180ft with RWx SBA.     Time Calculation:   PT Charges     Row Name 11/21/20 1055             Time Calculation    Start Time  1055  -MR      Stop Time  1108  -MR      Time Calculation (min)  13 min  -MR      PT Received On  11/21/20  -MR         Timed Charges    74219 - PT Therapeutic Exercise Minutes  13  -MR        User Key  (r) = Recorded By, (t) = Taken By, (c) = Cosigned By    Initials Name Provider Type    MR Kelli VINCENT Dickinson Physical Therapy Assistant        Therapy Charges for Today     Code Description Service Date Service Provider Modifiers Qty    [id number] HC PT THER PROC EA 15 MIN 11/21/2020 Teena Pereira PTA GP 1          PT G-Codes  Outcome Measure Options: AM-PAC 6 Clicks Basic Mobility (PT)  AM-PAC 6 Clicks Score (PT): 23  AM-PAC 6 Clicks Score (OT): 20    Teena Pereira PTA  11/21/2020

## 2020-11-21 NOTE — OUTREACH NOTE
Prep Survey      Responses   [facility] patient discharged from?  [place]   Is LACE score < 7 ?  No   Eligibility  [facility]   Date of Admission  11/18/20   Date of Discharge  11/21/20   Discharge Disposition  Home or Self Care   Discharge diagnosis  Acute renal failure    Does the patient have one of the following disease processes/diagnoses(primary or secondary)?  Other   Does the patient have Home health ordered?  Yes   What is the Home health agency?   ??   Is there a DME ordered?  No   Prep survey completed?  Yes          Hiral Jason RN

## 2020-11-23 ENCOUNTER — TRANSITIONAL CARE MANAGEMENT TELEPHONE ENCOUNTER (OUTPATIENT)
Dept: CALL CENTER | Facility: HOSPITAL | Age: 85
End: 2020-11-23

## 2020-11-23 NOTE — OUTREACH NOTE
"Call Center TCM Note      Responses   [facility] patient discharged from?  [facility]   Does the patient have one of the following disease processes/diagnoses(primary or secondary)?  Other   TCM attempt successful?  Yes   Discharge diagnosis  Acute renal failure    Meds reviewed with patient/caregiver?  Yes   Does the patient have all medications ordered at discharge?  N/A   Prescription comments  Pt was not prescribed any new medications and several d/c for now. Went over list with pt and she confirms knowledge of changes   Does the patient have a primary care provider?   Yes   Does the patient have an appointment with their PCP within 7 days of discharge?  Yes   Comments regarding PCP  Aileen HUMMEL   Has the patient kept scheduled appointments due by today?  Yes   What is the Home health agency?   [facility] seeing pt Weds 11/25   Psychosocial issues?  No   Did the patient receive a copy of their discharge instructions?  Yes   Nursing interventions  Reviewed instructions with patient   What is the patient's perception of their health status since discharge?  Improving   Is the patient/caregiver able to teach back signs and symptoms related to disease process for when to call PCP?  Yes   Is the patient/caregiver able to teach back signs and symptoms related to disease process for when to call 911?  Yes   Is the patient/caregiver able to teach back the hierarchy of who to call/visit for symptoms/problems? PCP, Specialist, Home health nurse, Urgent Care, ED, 911  Yes   If the patient is a current smoker, are they able to teach back resources for cessation?  Not a smoker   TCM call completed?  Yes   Wrap up additional comments  Pt doing better, states no further episodes of dizziness, nausea, or \"feeling like she is on a boat in a storm\". Pt has questions re: change in diet due to renal issues. but she is seeing PCP 11/25/2020 for TCM FWP and will discuss with her.           Rose Pruitt MA    11/23/2020, " 14:25 EST

## 2020-11-24 ENCOUNTER — OFFICE VISIT (OUTPATIENT)
Dept: INTERNAL MEDICINE | Facility: CLINIC | Age: 85
End: 2020-11-24

## 2020-11-24 VITALS
HEIGHT: 62 IN | TEMPERATURE: 97.5 F | HEART RATE: 60 BPM | SYSTOLIC BLOOD PRESSURE: 137 MMHG | OXYGEN SATURATION: 100 % | BODY MASS INDEX: 34.88 KG/M2 | DIASTOLIC BLOOD PRESSURE: 45 MMHG

## 2020-11-24 DIAGNOSIS — I10 ESSENTIAL HYPERTENSION: ICD-10-CM

## 2020-11-24 DIAGNOSIS — G45.9 TIA (TRANSIENT ISCHEMIC ATTACK): ICD-10-CM

## 2020-11-24 DIAGNOSIS — N18.30 STAGE 3 CHRONIC KIDNEY DISEASE, UNSPECIFIED WHETHER STAGE 3A OR 3B CKD (HCC): ICD-10-CM

## 2020-11-24 DIAGNOSIS — I25.10 CORONARY ARTERY DISEASE INVOLVING NATIVE CORONARY ARTERY OF NATIVE HEART WITHOUT ANGINA PECTORIS: ICD-10-CM

## 2020-11-24 DIAGNOSIS — Z09 HOSPITAL DISCHARGE FOLLOW-UP: Primary | ICD-10-CM

## 2020-11-24 PROCEDURE — 99496 TRANSJ CARE MGMT HIGH F2F 7D: CPT | Performed by: NURSE PRACTITIONER

## 2020-11-24 NOTE — PROGRESS NOTES
Transitional Care Follow Up Visit  Subjective     Tanya Codi Elise is a 87 y.o. female who presents for a transitional care management visit.    Within 48 business hours after discharge our office contacted her via telephone to coordinate her care and needs.      I reviewed and discussed the details of that call along with the discharge summary, hospital problems, inpatient lab results, inpatient diagnostic studies, and consultation reports with Tanya.     Current outpatient and discharge medications have been reconciled for the patient.  Reviewed by: ESTEPHANIE Castillo      Date of TCM Phone Call 11/21/2020   [facility]   Date of Admission 11/18/2020   Date of Discharge 11/21/2020   Discharge Disposition Home or Self Care     Risk for Readmission (LACE) Score: 13 (11/21/2020  6:00 AM)      History of Present Illness   Course During Hospital Stay: Patient was admitted to [facility] ED on 11/18/2020 after hospital follow-up visit with PCP, due to excessive dizziness as well as vomiting after leaving appointment.  She had been discharged from [facility] on 11/14/2020, with diagnosis of TIA, hypertension, CKD stage III with acute kidney injury, CAD, LLQ pain/diverticulitis.  Upon admission on 11/18/2020, she was found to be in acute renal failure, acute metabolic encephalopathy.  Medication changes were made including discontinuing ASA, Lasix and lisinopril.  She is to continue Eliquis and Plavix for suspected intermittent A. fib.  She has an appointment scheduled with Dr. Be (nephrology), Dr. Franco (cardiology).  She is accompanied by her son today, using walker for ambulation.  Home Health is scheduled to see her tomorrow.  Patient and son are struggling with diet that satisfies both cardiac and renal restrictions.     The following portions of the patient's history were reviewed and updated as appropriate: allergies, current medications, past family history, past medical history, past  social history, past surgical history and problem list.    Review of Systems   Constitutional: Positive for fatigue. Negative for activity change, appetite change, chills, diaphoresis and fever.   Respiratory: Negative for cough, shortness of breath and wheezing.    Cardiovascular: Positive for leg swelling. Negative for chest pain and palpitations.   Gastrointestinal: Negative for constipation, diarrhea, nausea and vomiting.   Genitourinary: Negative for decreased urine volume, difficulty urinating and pelvic pain.   Neurological: Negative for dizziness, syncope, facial asymmetry, speech difficulty, light-headedness and headaches.   Hematological: Does not bruise/bleed easily.       Objective   Physical Exam  Constitutional:       Appearance: She is not ill-appearing.   HENT:      Head: Normocephalic.      Right Ear: External ear normal.      Left Ear: External ear normal.   Eyes:      Conjunctiva/sclera: Conjunctivae normal.      Pupils: Pupils are equal, round, and reactive to light.   Neck:      Musculoskeletal: Normal range of motion and neck supple.   Cardiovascular:      Rate and Rhythm: Normal rate and regular rhythm.      Pulses: Normal pulses.      Heart sounds: Normal heart sounds.   Pulmonary:      Effort: Pulmonary effort is normal.      Breath sounds: Normal breath sounds.   Abdominal:      General: Bowel sounds are normal. There is no distension.      Tenderness: There is no abdominal tenderness.   Skin:     General: Skin is warm.      Capillary Refill: Capillary refill takes less than 2 seconds.      Comments: Normal turgor   Neurological:      Mental Status: She is alert and oriented to person, place, and time.      Coordination: Coordination normal.   Psychiatric:         Mood and Affect: Mood normal.         Behavior: Behavior normal.         Thought Content: Thought content normal.         Assessment/Plan   Diagnoses and all orders for this visit:    1. Hospital discharge follow-up (Primary)          -  Keep scheduled appointments with nephrology, cardiology         - Offered to refer HH PT/OT, patient wants to see what HH nurse offers as far as exercises to increase stamina.  Will contact clinic if referral to PT/OT desired.  Continue doing leg lifts, arm lifts, stretching while seated.      2. Essential hypertension  -     Ambulatory Referral to Nutrition Services        - Follow heart healthy diet.  Advised to reduce daily sodium intake to < 1000 mg per day.  Avoid processed & fast foods.        - Exercise as tolerated, with a goal of 30 minutes of moderate exercise most days.         - Take medications as prescribed.    3. Coronary artery disease involving native coronary artery of native heart without angina pectoris  -     Ambulatory Referral to Nutrition Services    4. Stage 3 chronic kidney disease, unspecified whether stage 3a or 3b CKD  -     Ambulatory Referral to Nutrition Services    5. TIA (transient ischemic attack)        - Continue taking eliquis and plavix as prescribed.

## 2020-11-25 NOTE — PROGRESS NOTES
Case Management Discharge Note      Final Note: Seen by [facility] on 11/25, 4 days post d/c.    Provided Post Acute Provider List?: N/A  N/A Provider List Comment: deciding    Selected Continued Care - Discharged on 11/21/2020 Admission date: 11/18/2020 - Discharge disposition: Home-Health Care Svc    Destination    No services have been selected for the patient.              Durable Medical Equipment    No services have been selected for the patient.              Dialysis/Infusion    No services have been selected for the patient.              Home Medical Care Coordination complete    Service Provider Selected Services Address Phone Fax Patient Preferred    [facility]  [facility] [address] [place] [address] [phone] --          Therapy    No services have been selected for the patient.              Community Resources    No services have been selected for the patient.                       Final Discharge Disposition Code: 01 - home or self-care

## 2020-11-30 ENCOUNTER — READMISSION MANAGEMENT (OUTPATIENT)
Dept: CALL CENTER | Facility: HOSPITAL | Age: 85
End: 2020-11-30

## 2020-11-30 NOTE — OUTREACH NOTE
Medical Week 2 Survey      Responses   [facility] patient discharged from?  [facility]   Does the patient have one of the following disease processes/diagnoses(primary or secondary)?  Other   Week 2 attempt successful?  No   Unsuccessful attempts  Attempt 1          Shruthi Joya RN

## 2020-12-03 ENCOUNTER — READMISSION MANAGEMENT (OUTPATIENT)
Dept: CALL CENTER | Facility: HOSPITAL | Age: 85
End: 2020-12-03

## 2020-12-03 NOTE — OUTREACH NOTE
Medical Week 2 Survey      Responses   [facility] patient discharged from?  [facility]   Does the patient have one of the following disease processes/diagnoses(primary or secondary)?  Other   Week 2 attempt successful?  Yes   Call start time  1511   Discharge diagnosis  Acute renal failure    Call end time  1516   Meds reviewed with patient/caregiver?  Yes   Is the patient taking all medications as directed (includes completed medication regime)?  Yes   Comments regarding appointments  Appt with Dr. Be on 12/4/20   Comments regarding PCP  11/24/20   Has the patient kept scheduled appointments due by today?  Yes   What is the patient's perception of their health status since discharge?  Improving   Week 2 Call Completed?  Yes   Wrap up additional comments  discussed patient having a nutritional consult          Sheree Chand RN

## 2020-12-07 ENCOUNTER — OFFICE VISIT (OUTPATIENT)
Dept: CARDIOLOGY | Facility: CLINIC | Age: 85
End: 2020-12-07

## 2020-12-07 VITALS
OXYGEN SATURATION: 97 % | SYSTOLIC BLOOD PRESSURE: 132 MMHG | HEIGHT: 62 IN | BODY MASS INDEX: 33.86 KG/M2 | WEIGHT: 184 LBS | HEART RATE: 58 BPM | DIASTOLIC BLOOD PRESSURE: 62 MMHG

## 2020-12-07 DIAGNOSIS — I25.10 CORONARY ARTERY DISEASE INVOLVING NATIVE CORONARY ARTERY OF NATIVE HEART WITHOUT ANGINA PECTORIS: Primary | ICD-10-CM

## 2020-12-07 DIAGNOSIS — I10 ESSENTIAL HYPERTENSION: ICD-10-CM

## 2020-12-07 DIAGNOSIS — E78.00 PURE HYPERCHOLESTEROLEMIA: ICD-10-CM

## 2020-12-07 PROCEDURE — 99214 OFFICE O/P EST MOD 30 MIN: CPT | Performed by: INTERNAL MEDICINE

## 2020-12-07 NOTE — PROGRESS NOTES
Subjective:     Encounter Date:12/07/2020      Patient ID: Tanya Elise is a 87 y.o. female.    Chief Complaint: Coronary artery disease  HPI  This is an 87-year-old female patient who presents to cardiology clinic for routine follow-up.  The patient indicates she is done very well since her last visit.  She continues to do cardiac rehab as an outpatient at her home through home health.  She reports compliance with her medications with no perceived side effects.  The patient has no chest discomfort at rest or with activity.  There is no exertional chest arm neck jaw shoulder or back discomfort.  There is no orthopnea PND or lower extremity edema.  There is no dizziness palpitations or syncope.  The following portions of the patient's history were reviewed and updated as appropriate: allergies, current medications, past family history, past medical history, past social history, past surgical history and problem  Review of Systems   Constitution: Negative for chills, diaphoresis, fever, malaise/fatigue, weight gain and weight loss.   HENT: Negative for ear discharge, hearing loss, hoarse voice and nosebleeds.    Eyes: Negative for discharge, double vision, pain and photophobia.   Cardiovascular: Negative for chest pain, claudication, cyanosis, dyspnea on exertion, irregular heartbeat, leg swelling, near-syncope, orthopnea, palpitations, paroxysmal nocturnal dyspnea and syncope.   Respiratory: Negative for cough, hemoptysis, shortness of breath, sputum production and wheezing.    Endocrine: Negative for cold intolerance, heat intolerance, polydipsia, polyphagia and polyuria.   Hematologic/Lymphatic: Negative for adenopathy and bleeding problem. Does not bruise/bleed easily.   Skin: Negative for color change, flushing, itching and rash.   Musculoskeletal: Negative for muscle cramps, muscle weakness, myalgias and stiffness.   Gastrointestinal: Negative for abdominal pain, diarrhea, hematemesis, hematochezia,  nausea and vomiting.   Genitourinary: Negative for dysuria, frequency and nocturia.   Neurological: Negative for focal weakness, loss of balance, numbness, paresthesias and seizures.   Psychiatric/Behavioral: Negative for altered mental status, hallucinations and suicidal ideas.   Allergic/Immunologic: Negative for HIV exposure, hives and persistent infections.           Current Outpatient Medications:   •  amLODIPine (NORVASC) 5 MG tablet, Take 1 tablet by mouth Every Night., Disp: 90 tablet, Rfl: 3  •  apixaban (ELIQUIS) 2.5 MG tablet tablet, Take 1 tablet by mouth Every 12 (Twelve) Hours for 30 days. Indications: Atrial Fibrillation, Disp: 60 tablet, Rfl: 0  •  baclofen (LIORESAL) 10 MG tablet, Take 0.5 tablets by mouth 2 (Two) Times a Day As Needed for Muscle Spasms., Disp: , Rfl:   •  carbamide peroxide (Debrox) 6.5 % otic solution, Administer 5 drops into both ears As Needed for Ear Pain., Disp: 15 mL, Rfl: 0  •  carvedilol (COREG) 3.125 MG tablet, Take 1 tablet by mouth 2 (Two) Times a Day With Meals., Disp: 60 tablet, Rfl: 11  •  clopidogrel (PLAVIX) 75 MG tablet, Take 1 tablet by mouth Daily., Disp: 30 tablet, Rfl: 11  •  fexofenadine (ALLEGRA) 60 MG tablet, Take 60 mg by mouth Daily As Needed., Disp: , Rfl:   •  fluticasone (Flonase) 50 MCG/ACT nasal spray, 1 spray into the nostril(s) as directed by provider Daily., Disp: 18.2 mL, Rfl: 3  •  ondansetron (ZOFRAN) 4 MG tablet, Take 1 tablet by mouth Every 6 (Six) Hours As Needed for Nausea or Vomiting for up to 30 days., Disp: 30 tablet, Rfl: 0  •  pravastatin (Pravachol) 40 MG tablet, Take 1 tablet by mouth Every Night. For cholesterol., Disp: 90 tablet, Rfl: 3  •  Vitamin D, Cholecalciferol, (CHOLECALCIFEROL) 10 MCG (400 UNIT) tablet, Take 400 Units by mouth Daily., Disp: , Rfl:     Objective:   Vitals signs and nursing note reviewed.   Constitutional:       Appearance: Healthy appearance. Not in distress.   Neck:      Vascular: No JVR. JVD normal.  "  Pulmonary:      Effort: Pulmonary effort is normal.      Breath sounds: Normal breath sounds. No wheezing. No rhonchi. No rales.   Chest:      Chest wall: Not tender to palpatation.   Cardiovascular:      PMI at left midclavicular line. Normal rate. Regular rhythm. Normal S1. Normal S2.      Murmurs: There is no murmur.      No gallop. No click. No rub.   Pulses:     Intact distal pulses.   Edema:     Peripheral edema absent.   Abdominal:      General: Bowel sounds are normal.      Palpations: Abdomen is soft.      Tenderness: There is no abdominal tenderness.   Musculoskeletal: Normal range of motion.         General: No tenderness.   Skin:     General: Skin is warm and dry.   Neurological:      General: No focal deficit present.      Mental Status: Alert and oriented to person, place and time.       Blood pressure 132/62, pulse 58, height 157.5 cm (62\"), weight 83.5 kg (184 lb), SpO2 97 %.   Lab Review:     Assessment:       1. Coronary artery disease involving native coronary artery of native heart without angina pectoris  Stable and angina free.    2. Essential hypertension  Acceptable blood pressure control.    3. Pure hypercholesterolemia  This is followed closely by her primary care provider.    Procedures    Plan:     No changes to her medications have been made at today's visit.  No additional cardiovascular testing is warranted at this time.  The patient is encouraged to maintain her active lifestyle.      "

## 2020-12-10 ENCOUNTER — READMISSION MANAGEMENT (OUTPATIENT)
Dept: CALL CENTER | Facility: HOSPITAL | Age: 85
End: 2020-12-10

## 2020-12-10 NOTE — OUTREACH NOTE
Medical Week 3 Survey      Responses   [facility] patient discharged from?  [facility]   Does the patient have one of the following disease processes/diagnoses(primary or secondary)?  Other   Week 3 attempt successful?  Yes   Call start time  1511   Call end time  1520   Discharge diagnosis  Acute renal failure    Person spoke with today (if not patient) and relationship  Daughter-[place] reviewed with patient/caregiver?  Yes   Is the patient having any side effects they believe may be caused by any medication additions or changes?  No   Is the patient taking all medications as directed (includes completed medication regime)?  Yes   Does the patient have a primary care provider?   Yes   Comments regarding PCP  Patient has f/u with PCP   Does the patient have an appointment with their PCP within 7 days of discharge?  Yes   Has the patient kept scheduled appointments due by today?  Yes   What is the Home health agency?   [facility] visiting for VS and PT   Has home health visited the patient within 72 hours of discharge?  Yes   Psychosocial issues?  No   Comments  Daughter states patient is getting stronger, not needing walker most of times.     Did the patient receive a copy of their discharge instructions?  Yes   Nursing interventions  Reviewed instructions with patient   What is the patient's perception of their health status since discharge?  Improving   Is the patient/caregiver able to teach back signs and symptoms related to disease process for when to call PCP?  Yes   Is the patient/caregiver able to teach back signs and symptoms related to disease process for when to call 911?  Yes   Is the patient/caregiver able to teach back the hierarchy of who to call/visit for symptoms/problems? PCP, Specialist, Home health nurse, Urgent Care, ED, 911  Yes   Additional teach back comments  Patient has a good appetite, she has been careful of intake.   Week 3 Call Completed?  Yes   Wrap up additional comments   Daughter states patient is still struggling with what she can eat.  She was provided a resource to contact.  Denies any other needs at this time.           Shannan Mckay RN

## 2020-12-15 DIAGNOSIS — G45.9 TIA (TRANSIENT ISCHEMIC ATTACK): Primary | ICD-10-CM

## 2020-12-15 DIAGNOSIS — I48.91 ATRIAL FIBRILLATION, UNSPECIFIED TYPE (HCC): ICD-10-CM

## 2020-12-16 DIAGNOSIS — G45.9 TIA (TRANSIENT ISCHEMIC ATTACK): ICD-10-CM

## 2020-12-16 DIAGNOSIS — I48.91 ATRIAL FIBRILLATION, UNSPECIFIED TYPE (HCC): ICD-10-CM

## 2020-12-18 ENCOUNTER — READMISSION MANAGEMENT (OUTPATIENT)
Dept: CALL CENTER | Facility: HOSPITAL | Age: 85
End: 2020-12-18

## 2020-12-18 NOTE — OUTREACH NOTE
Medical Week 4 Survey      Responses   [facility] [place] patient discharged from?  [facility]   Does the patient have one of the following disease processes/diagnoses(primary or secondary)?  Other   Week 4 attempt successful?  No          Zo Smiley RN

## 2020-12-23 ENCOUNTER — HOSPITAL ENCOUNTER (OUTPATIENT)
Dept: NUTRITION | Facility: HOSPITAL | Age: 85
Discharge: HOME | End: 2020-12-23

## 2020-12-23 VITALS — HEIGHT: 62 IN | WEIGHT: 184 LBS | BODY MASS INDEX: 33.86 KG/M2

## 2020-12-23 NOTE — PROGRESS NOTES
"Adult Outpatient Nutrition  Assessment/PES    Patient Name:  Tanya Elise  YOB: 1933  MRN: [id number]    Assessment Date:  12/23/2020    Comments:  RD spoke with pt for nutrition appt focusing on heart healthy and kidney healthy diet focusing on protein, sodium, phosphorus and potassium intakes. Due to contact limitation, unable to obtain signature. Pt given copies of forms, has acknowledged and agreed. Pt's current weight is 184 lbs. She notes a 1 lb weight fluctuation normally. She has a history with diverticulitis as well as CKD stage 3. She notes an intolerance with garlic and onions. Pt shared normal intakes and foods she regularly consumes. RD and pt discussed ways to increase variety in meal intake while watching nutrient level intake. She drinks water throughout the day. A protein goal was discussed and examples of portions were reviewed. Pt states understanding and is familiar with nutrition fact labels and notes serving sizes regularly. A review of healthier options when eating out was reviewed as well.    RD to mail food nutrient lists as well as sample menus for pt to review. Pt very appreciative. RD provided contact information and encouraged her to reach out with any future needs. RD available PRN.    General Info     Row Name 12/23/20 1513       Today's Session    Person(s) attending today's session  Patient     Services Used Today?  No       General Information    How Well Do You Speak English?  very well    Do You Speak a Language Other Than English at Home?  no    Preferred Language  English    Are you able to read and write English?  Yes        Physical Findings     Row Name 12/23/20 1513          Physical Findings    Overall Physical Appearance  obese         Anthropometrics     Row Name 12/23/20 1515 12/23/20 1513       Anthropometrics    Height  157.5 cm (62\")  157.5 cm (62\")    Weight  --  83.5 kg (184 lb)       Ideal Body Weight (IBW)    Ideal Body Weight " "(IBW) (kg)  50.43  50.43    % Ideal Body Weight  --  165.49       Body Mass Index (BMI)    BMI (kg/m2)  --  33.72        Nutritional Info/Activity     Row Name 12/23/20 1514       Nutritional Information    Have you had weight changes?  No Small fluctuation in 1 lb    Have you tried to lose weight before?  No    List any food aversions  Onions, garlic    How often during the day do you find yourself snacking?  Not often    How often do you eat out and where?  Very seldom    What is the biggest challenge you have with your diet?  Knowledge    Enter everything you can remember eating in the last 24 hours (1 day)  Breakfast: scrambled egg with low fat jackson bits; Lunch: Tuna salad with apple pieces Dinner: Rice with coleslaw and chicken       Physical Activity    Are you currently involved in an activity/exercise program?   No          Estimated/Assessed Needs     Row Name 12/23/20 1515 12/23/20 1513       Calculation Measurements    Weight Used For Calculations  83.5 kg (184 lb)  --    Height  157.5 cm (62\")  157.5 cm (62\")       Estimated/Assessed Needs    Additional Documentation  Calorie Requirements (Group);Protein Requirements (Group);Pocahontas-St. Jeor Equation (Group);Fluid Requirements (Group)  --       Calorie Requirements    Estimated Calorie Need Method  Poc[place]ontas-St [employer]mitt[employer]or  --    Estimated Calorie Requirement Comment  1250 - 1450  --       Pocahontas-St. Jeor Equation    RMR (Pocahontas-St. Jeor Equation)  1222.87  --    Pocahontas-St. [employer]or Activity Factors  1.2  --    Activity Factors (iTwixie-St. Jeor)  1467.444  --       Protein Requirements    Weight Used For Protein Calculations  83.5 kg (184 lb) Actual BW  --    Est Protein Requirement Amount (gms/kg)  0.8 gm protein 51 - 66 gm  --    Estimated Protein Requirements (gms/day)  66.77  --       Fluid Requirements    Estimated Fluid Requirement Method  [place]-Segar Formula  --    [place]-Segar Method (over 20 kg)  3169.24  --    "             Problem/Interventions:  Problem 1     Row Name 12/23/20 1517          Nutrition Diagnoses Problem 1    Problem 1  Decreased Nutrient Needs     Macronutrient  Protein     Micronutrient  Potassium;Sodium;Phosphorous     Etiology (related to)  Medical Diagnosis     Cardiac  HTN;CAD     Renal  CKD     Signs/Symptoms (evidenced by)  Report/Observation     Reported/Observed By  MD                 Intervention Goal     Row Name 12/23/20 1518          Intervention Goal    General  Meet nutritional needs for age/condition;Improved nutrition related lab(s)     PO  Meet estimated needs     Weight  Appropriate weight loss           Nutrition Prescription     Row Name 12/23/20 1518          Nutrition Prescription PO    PO Prescription  Begin/change diet     Begin/Change Diet to  Regular     Common Modifiers  Cardiac;Renal         Education/Evaluation     Row Name 12/23/20 1518          Education    Education  Provided education regarding     Provided education regarding  Diet rationale;Key food habit change;Medical diagnosis;Nutrition for age;Preventative health           Electronically signed by:  Sophie Norman RD  12/23/20 15:19 EST

## 2021-01-19 ENCOUNTER — OFFICE VISIT (OUTPATIENT)
Dept: NEUROLOGY | Facility: CLINIC | Age: 86
End: 2021-01-19

## 2021-01-19 ENCOUNTER — TRANSCRIBE ORDERS (OUTPATIENT)
Dept: LAB | Facility: HOSPITAL | Age: 86
End: 2021-01-19

## 2021-01-19 ENCOUNTER — LAB (OUTPATIENT)
Dept: LAB | Facility: HOSPITAL | Age: 86
End: 2021-01-19

## 2021-01-19 VITALS
HEIGHT: 62 IN | TEMPERATURE: 97.1 F | WEIGHT: 186.8 LBS | BODY MASS INDEX: 34.37 KG/M2 | DIASTOLIC BLOOD PRESSURE: 62 MMHG | HEART RATE: 60 BPM | OXYGEN SATURATION: 99 % | SYSTOLIC BLOOD PRESSURE: 128 MMHG

## 2021-01-19 DIAGNOSIS — N18.32 CHRONIC KIDNEY DISEASE (CKD) STAGE G3B/A1, MODERATELY DECREASED GLOMERULAR FILTRATION RATE (GFR) BETWEEN 30-44 ML/MIN/1.73 SQUARE METER AND ALBUMINURIA CREATININE RATIO LESS THAN 30 MG/G (CMS/H* (HCC): ICD-10-CM

## 2021-01-19 DIAGNOSIS — N18.32 CHRONIC KIDNEY DISEASE (CKD) STAGE G3B/A1, MODERATELY DECREASED GLOMERULAR FILTRATION RATE (GFR) BETWEEN 30-44 ML/MIN/1.73 SQUARE METER AND ALBUMINURIA CREATININE RATIO LESS THAN 30 MG/G (CMS/H* (HCC): Primary | ICD-10-CM

## 2021-01-19 DIAGNOSIS — I63.9 CEREBROVASCULAR ACCIDENT (CVA), UNSPECIFIED MECHANISM (HCC): Primary | ICD-10-CM

## 2021-01-19 LAB
DEPRECATED RDW RBC AUTO: 45.2 FL (ref 37–54)
ERYTHROCYTE [DISTWIDTH] IN BLOOD BY AUTOMATED COUNT: 12.9 % (ref 12.3–15.4)
HCT VFR BLD AUTO: 30.3 % (ref 34–46.6)
HGB BLD-MCNC: 9.8 G/DL (ref 12–15.9)
MCH RBC QN AUTO: 31.2 PG (ref 26.6–33)
MCHC RBC AUTO-ENTMCNC: 32.3 G/DL (ref 31.5–35.7)
MCV RBC AUTO: 96.5 FL (ref 79–97)
PLATELET # BLD AUTO: 236 10*3/MM3 (ref 140–450)
PMV BLD AUTO: 11.9 FL (ref 6–12)
RBC # BLD AUTO: 3.14 10*6/MM3 (ref 3.77–5.28)
WBC # BLD AUTO: 5.45 10*3/MM3 (ref 3.4–10.8)

## 2021-01-19 PROCEDURE — 85027 COMPLETE CBC AUTOMATED: CPT

## 2021-01-19 PROCEDURE — 36415 COLL VENOUS BLD VENIPUNCTURE: CPT

## 2021-01-19 PROCEDURE — 99205 OFFICE O/P NEW HI 60 MIN: CPT | Performed by: NURSE PRACTITIONER

## 2021-01-19 RX ORDER — ONDANSETRON 4 MG/1
4 TABLET, FILM COATED ORAL EVERY 8 HOURS PRN
COMMUNITY

## 2021-01-19 NOTE — PROGRESS NOTES
New Neurology Patient Office Visit      Patient Name: Tanya Elise    Referring Physician: Aileen Grande, *    Chief Complaint:    Chief Complaint   Patient presents with   • Dizziness     New Patient here for hospital follow up TIA and vertigo       History of Present Illness: Tanya Elise is a very pleasant 87 y.o. female who is here today to establish care with Neurology.  She has unfortunately suffered several TIAs and recently was treated for stroke. She has been recovering well, no further stroke or TIA episodes. She is compliant with her medications, and has been following with cardiology. She lives with her son, both her son and daughter are very involved in patient's care and help provide history today. She has been receiving home health services for PT.    Neurology Inpatient Consult Assessment/Plan:  87-year-old with history of stroke, multiple TIA localizable to bilateral hemispheres, coronary artery disease s/p recent stents, hypertension, hyperlipidemia presented with right facial numbness.      The MRI of brain evidenced cortical embolic looking infarct. Patient endorsed multiple episodes of TIAs localizable to bilateral hemisphere.      Her current admission symptoms of right side numbness does not correlated with MRI abnormality of right fronto-parietal infarct.      Based on the diagnostics, history and exam, my suspicion of this to be central embolic source is likely. We will consider short term Eliquis for 30 days with cardiac event monitor at discharge, with a cardiology and neurology follow up.           1a. Right fronto-parietal, Acute ischemic stroke, likely etiology cardio embolic vs large vessel atheroemboli    1b. Stroke secondary prevention- Eliquis started during this admission+ plavix  1c. Stroke recovery- Activity, PT/OT/Speech, can do the full consult   1d. Stroke education- Educated on modifiable stroke risk factors.                #2  Hypertension-normalize blood pressure goals  #3 Type 2 diabetes-strict glycemic control, goal less than 7  #4 Hyperlipidemia- LDL goal less than < 70. Start high intensity statin      Plan  -Eliquis 5 mg BID + Plavix 75 daily.  -If cleared by therapy, patient can be discharged.  -Cardiac event monitor at discharge.  -TYRON as outpatient.  -Cardiology and neurology follow up as outpatient.       The following portions of the patient's history were reviewed and updated as appropriate: allergies, current medications, past family history, past medical history, past social history, past surgical history and problem list.    Subjective      Review of Systems:   Review of Systems   HENT: Negative.    Respiratory: Negative.    Cardiovascular: Negative.    Gastrointestinal:          Black stools   Genitourinary: Negative.    Allergic/Immunologic: Negative.    Neurological: Positive for weakness, numbness and memory problem.   Hematological: Bruises/bleeds easily.   Psychiatric/Behavioral: Negative.      Past Medical History:   Past Medical History:   Diagnosis Date   • Acid reflux    • Allergic    • Cataract    • Cerumen impaction    • Cyst of kidney, acquired    • Dizziness    • High cholesterol    • Hypertension    • Impaired functional mobility, balance, gait, and endurance    • Influenza    • Reflux gastritis    • Seasonal allergies    • Spinal headache    • Stroke (CMS/HCC)    • Transient ischemic attack    • Wears glasses        Past Surgical History:   Past Surgical History:   Procedure Laterality Date   • APPENDECTOMY     • BREAST BIOPSY Left    • CARDIAC CATHETERIZATION N/A 10/13/2020    Procedure: Left Heart Cath;  Surgeon: Travis Franco MD;  Location: [facility] CATH INVASIVE LOC[facility];  Service: Cardiovascular;  Laterality: N/A;   • CATARACT EXTRACTION Bilateral    •  SECTION      X2   • COLONOSCOPY     • DILATATION AND CURETTAGE     • EYE CAPSULOTOMY WITH LASER Right 3/20/2017    Procedure: RIGHT EYE  CAPSULOTOMY WITH LASER;  Surgeon: Ev Cortez MD;  Location: [facility];  Service:    • HYSTERECTOMY     • NASAL SEPTUM SURGERY     • TOOTH EXTRACTION         Family History:   Family History   Problem Relation Age of Onset   • Diabetes Son        Social History:   Social History     Socioeconomic History   • Marital status:      Spouse name: Not on file   • Number of children: Not on file   • Years of education: Not on file   • Highest education level: Not on file   Tobacco Use   • Smoking status: Never Smoker   • Smokeless tobacco: Never Used   Substance and Sexual Activity   • Alcohol use: No   • Drug use: No   • Sexual activity: Defer       Medications:     Current Outpatient Medications:   •  amLODIPine (NORVASC) 5 MG tablet, Take 1 tablet by mouth Every Night., Disp: 90 tablet, Rfl: 3  •  apixaban (ELIQUIS) 2.5 MG tablet tablet, Take 1 tablet by mouth Every 12 (Twelve) Hours. Indications: Atrial Fibrillation, Disp: 60 tablet, Rfl: 6  •  baclofen (LIORESAL) 10 MG tablet, Take 0.5 tablets by mouth 2 (Two) Times a Day As Needed for Muscle Spasms., Disp: , Rfl:   •  carbamide peroxide (Debrox) 6.5 % otic solution, Administer 5 drops into both ears As Needed for Ear Pain., Disp: 15 mL, Rfl: 0  •  carvedilol (COREG) 3.125 MG tablet, Take 1 tablet by mouth 2 (Two) Times a Day With Meals., Disp: 60 tablet, Rfl: 11  •  clopidogrel (PLAVIX) 75 MG tablet, Take 1 tablet by mouth Daily., Disp: 30 tablet, Rfl: 11  •  fexofenadine (ALLEGRA) 60 MG tablet, Take 60 mg by mouth Daily As Needed., Disp: , Rfl:   •  fluticasone (Flonase) 50 MCG/ACT nasal spray, 1 spray into the nostril(s) as directed by provider Daily., Disp: 18.2 mL, Rfl: 3  •  ondansetron (ZOFRAN) 4 MG tablet, Take 4 mg by mouth Every 8 (Eight) Hours As Needed for Nausea or Vomiting., Disp: , Rfl:   •  pravastatin (Pravachol) 40 MG tablet, Take 1 tablet by mouth Every Night. For cholesterol., Disp: 90 tablet, Rfl: 3  •  Vitamin D, Cholecalciferol,  "(CHOLECALCIFEROL) 10 MCG (400 UNIT) tablet, Take 400 Units by mouth Daily., Disp: , Rfl:     Allergies:   Allergies   Allergen Reactions   • Crestor [Rosuvastatin Calcium] Headache   • Lipitor [Atorvastatin Calcium] Myalgia   • Penicillins Rash       Objective     Physical Exam:  Vital Signs:   Vitals:    01/19/21 0845   BP: 128/62   BP Location: Right arm   Patient Position: Sitting   Cuff Size: Adult   Pulse: 60   Temp: 97.1 °F (36.2 °C)   SpO2: 99%   Weight: 84.7 kg (186 lb 12.8 oz)   Height: 157.5 cm (62\")   PainSc:   6   PainLoc: Back       Physical Exam  Vitals signs and nursing note reviewed.   Cardiovascular:      Rate and Rhythm: Regular rhythm.      Heart sounds: Normal heart sounds.   Pulmonary:      Effort: Pulmonary effort is normal.      Breath sounds: Normal breath sounds.   Skin:     General: Skin is warm.   Neurological:      Mental Status: She is oriented to person, place, and time.      Coordination: Finger-Nose-Finger Test normal.      Gait: Gait is intact.      Deep Tendon Reflexes: Strength normal.      Reflex Scores:       Bicep reflexes are 1+ on the right side and 1+ on the left side.       Patellar reflexes are 1+ on the right side and 1+ on the left side.  Psychiatric:         Mood and Affect: Mood normal.         Speech: Speech normal.         Behavior: Behavior normal.         Thought Content: Thought content normal.         Judgment: Judgment normal.         Neurologic Exam     Mental Status   Oriented to person, place, and time.   Registration: recalls 3 of 3 objects. Recall of objects at 5 minutes: 0/3. Follows 3 step commands.   Attention: normal. Concentration: normal.   Speech: speech is normal   Knowledge: consistent with education. Able to perform simple calculations (3/5 ).   Able to name object. Able to read. Unable to repeat. Able to write. Normal comprehension.     MMSE 24/30     Cranial Nerves   Cranial nerves II through XII intact.     Motor Exam   Muscle bulk: " normal  Overall muscle tone: normal    Strength   Strength 5/5 throughout.     Sensory Exam   Light touch normal.     Gait, Coordination, and Reflexes     Gait  Gait: normal    Coordination   Finger to nose coordination: normal    Tremor   Resting tremor: absent    Reflexes   Right biceps: 1+  Left biceps: 1+  Right patellar: 1+  Left patellar: 1+       Inpatient studies November 2020:  CAROTID DUPLEX    HISTORY: TIA; G45.9-Transient cerebral ischemic attack, unspecified     Validated velocity criteria was used to estimate the degree of stenosis.     FINDINGS:      RIGHT CAROTID:        ICA PSV - 181 cm/sec  ICA/CCA systolic flow velocity ratio - 1.35     Mild plaque disease is noted.      LEFT CAROTID:        ICA PSV - 168 cm/sec  ICA/CCA systolic flow velocity ratio - 0.83     Mild  plaque disease is noted.      Vertebrals: Antegrade bilaterally      IMPRESSION:  No evidence of hemodynamically significant cervical carotid stenosis    MRI BRAIN     INDICATION: Facial droop. Right-sided facial numbness.     FINDINGS: Multisequence multiplanar MR imaging of the brain without  contrast. Correlation is made with CT performed earlier today at 0003  hours.     Small area of slightly ill-defined restricted diffusion signal is  identified near the vertex at the frontoparietal region. This is near  the cortex and is consistent with a small area of acute  ischemia/infarct. Other etiologies including infection are thought  unlikely. Otherwise, there is some T2 shine through related artifact but  no other areas of restricted diffusion. Multiple areas of increased  FLAIR signal bilateral white matter are nonspecific but consistent with  chronic microvascular ischemia. Diffuse cortical volume loss. No edema,  mass effect or midline shift. Basal cisterns are patent. No obvious  evidence of hemorrhage. Usual vascular flow voids of the [place] of  [place] are preserved.     IMPRESSION:  1. Findings are consistent with a small area of  restricted diffusion  suggestive of ischemia/infarct near the vertex of the right  frontoparietal region as detailed above.  2. Other chronic appearing findings including cortical volume loss and  white matter changes likely due to chronic microvascular ischemia.    MR ANGIOGRAPHY OF THE HEAD   INDICATION: Recent stroke.      FINDINGS: Nonenhanced 3-D time-of-flight imaging MR angiogram of the  head. Correlation is made with MRI performed earlier today. There is  some minimal artifact. Allowing for this, [place] is intact  without large vessel occlusion, high-grade stenosis or identified  aneurysm.     IMPRESSION:  Unremarkable MR angiogram    MR angiogram of the neck     INDICATION: Carotid artery stenosis     FINDINGS: Time-of-flight MR angiographic assessment of the neck without  contrast. Comparison is made with carotid ultrasound which was performed  earlier today and revealed no findings to suggest a hemodynamically  significant stenosis.     Some images are degraded by patient motion artifact. The origin of the  great vessels was not entirely included within the field-of-view.  Allowing for this, no significant areas of bilateral carotid artery  stenosis. The vertebral arteries are diffusely patent.     IMPRESSION:  No findings to suggest hemodynamically significant carotid  or vertebral artery stenosis, concordant with carotid ultrasound  performed earlier today    CT HEAD WO  TECHNIQUE:  Routine axial images through the head were obtained without  contrast.     CLINICAL HISTORY:  Vomiting, lightheaded     FINDINGS:  The ventricles are dilated, proportionate to the degree of  generalized atrophy.  Periventricular and deep white matter  low-attenuation areas are consistent with chronic small vessel  ischemia.  There is no mass or shift in midline structures.  There is no intracranial hemorrhage.  There is no acute sinus or  osseous abnormality.     IMPRESSION:  No acute intracranial  abnormality.    Cardiac Echo 11/2020  · Estimated left ventricular EF = 65% Left ventricular ejection fraction appears to be 61 - 65%. Left ventricular systolic function is normal.  · Left ventricular diastolic function is consistent with age.     CAROTID DUPLEX 11/13/2020      HISTORY: TIA; G45.9-Transient cerebral ischemic attack, unspecified     Validated velocity criteria was used to estimate the degree of stenosis.      FINDINGS:      RIGHT CAROTID:      ICA PSV - 181 cm/sec  ICA/CCA systolic flow velocity ratio - 1.35     Mild plaque disease is noted.      LEFT CAROTID:     ICA PSV - 168 cm/sec  ICA/CCA systolic flow velocity ratio - 0.83     Mild  plaque disease is noted.      Vertebrals: Antegrade bilaterally      IMPRESSION:  No evidence of hemodynamically significant cervical carotid stenosis    Results Review:   I reviewed the patient's new clinical results.  I have reviewed the patient's other medical records to include, labs, radiology and referrals.   I reviewed the patient's new imaging results and agree with the interpretation.  I reviewed the patient's other test results and agree with the interpretation  Extensive review of hospital records    Assessment / Plan      Assessment/Plan:   Diagnoses and all orders for this visit:    1. Cerebrovascular accident (CVA), unspecified mechanism (CMS/HCC) (Primary)    Patient has been recovering well since suffering right frontoparietal stroke.  She continues with Eliquis, although it is dosed 2.5 mg twice daily.  Awaiting cardiology input to determine if patient may increase Eliquis dosing to 5 mg twice daily as discussed in neurology inpatient note, given that patient experienced TIA symptoms following frontoparietal infarct.  Patient and her son deny need for additional physical therapy or other rehab services today.  I discussed with him that her MMSE score is somewhat concerning for moderate dementia, patient and her son deny need for medication today.   They deny any current safety concerns, but would have low threshold to initiate donepezil therapy if memory concerns at future visits.  Discussed signs and symptoms of stroke and when to call 911. Instructed to follow a low fat diet including the Mediterranean diet. Instructed to take all medications daily as prescribed. Encouraged 30 minutes of exercise 3-4 times a week as tolerated. Stay well hydrated. Discussed potential side effects of new medications and signs and symptoms to report. If patient is currently using tobacco products, smoking cessation was encouraged. Reviewed stroke risk factors and stroke prevention plan. Patient and/or family verbalizes understanding and agrees with plan.     Follow Up:   Return in about 6 months (around 7/19/2021) for Next scheduled follow up.     ESTEPHANIE Parker  [facility]   AS THE PROVIDER, I PERSONALLY WORE PPE DURING ENTIRE FACE TO FACE ENCOUNTER IN CLINIC WITH THE PATIENT. PATIENT ALSO WORE PPE DURING ENTIRE FACE TO FACE ENCOUNTER EXCEPT FOR A MAX OF 30 SECONDS DURING NEUROLOGICAL EVALUATION OF CRANIAL NERVES AND THEN MASK WAS PLACED BACK OVER PATIENT FACE FOR REMAINDER OF VISIT. I WASHED MY HANDS BEFORE AND AFTER VISIT.    Please note that portions of this note may have been completed with a voice recognition program. Efforts were made to edit the dictations, but occasionally words are mistranscribed.

## 2021-01-19 NOTE — PATIENT INSTRUCTIONS
Hospital Discharge After a Stroke   Being discharged from the hospital after a stroke can feel overwhelming. Many things may be different, and it is normal to feel scared or anxious. Some stroke survivors may be able to return to their homes, and others may need more specialized care on a temporary or permanent basis.  Your stroke care team will work with you to develop a discharge plan that is best for you. Ask questions if you do not understand something. Invite a friend or family member to participate in discharge planning. Understanding and following your discharge plan can help to prevent another stroke or other problems.  Understanding your medicines  After a stroke, your health care provider may prescribe one or more types of medicine. It is important to take medicines exactly as told by your health care provider. Serious harm, such as another stroke, can happen if you are unable to take your medicine exactly as prescribed. Make sure you understand:  · What medicine to take.  · Why you are taking the medicine.  · How and when to take it.  · If it can be taken with your other medicines and herbal supplements.  · Possible side effects.  · When to call your health care provider if you have any side effects.  · How you will get and pay for your medicines. Medical assistance programs may be able to help you pay for prescription medicines if you cannot afford them.  If you are taking an anticoagulant, be sure to take it exactly as told by your health care provider. This type of medicine can increase the risk of bleeding because it works to prevent blood from clotting. You may need to take certain precautions to prevent bleeding. You should contact your health care provider if you have:  · Bleeding or bruising.  · A fall or other injury to your head.  · Blood in your urine or stool (feces).  Planning for home safety    Take steps to prevent falls, such as installing grab bars or using a shower chair. Ask a friend or  "family member to get needed things in place before you go home if possible. A therapist can come to your home to make recommendations for safety equipment. Ask your health care provider if you would benefit from this service or from home care.  Getting needed equipment  Ask your health care provider for a list of any medical equipment and supplies you will need at home. These may include items such as:  · Walkers.  · Canes.  · Wheelchairs.  · Hand-strengthening devices.  · Special eating utensils.  Medical equipment can be rented or purchased, depending on your insurance coverage. Check with your insurance company about what is covered.  Keeping follow-up visits  After a stroke, you will need to follow up regularly with a health care provider. You may also need rehabilitation, which can include physical therapy, occupational therapy, or speech-language therapy.  Keeping these appointments is very important to your recovery after a stroke. Be sure to bring your medicine list and discharge papers with you to your appointments. If you need help to keep track of your schedule, use a calendar or appointment reminder.  Preventing another stroke  Having a stroke puts you at risk for another stroke in the future. Ask your health care provider what actions you can take to lower the risk. These may include:  · Increasing how much you exercise.  · Making a healthy eating plan.  · Quitting smoking.  · Managing other health conditions, such as high blood pressure, high cholesterol, or diabetes.  · Limiting alcohol use.  Knowing the warning signs of a stroke    Make sure you understand the signs of a stroke. Before you leave the hospital, you will receive information outlining the stroke warning signs. Share these with your friends and family members.  \"BE FAST\" is an easy way to remember the main warning signs of a stroke:  · B - Balance. Signs are dizziness, sudden trouble walking, or loss of balance.  · E - Eyes. Signs are " "trouble seeing or a sudden change in vision.  · F - Face. Signs are sudden weakness or numbness of the face, or the face or eyelid drooping on one side.  · A - Arms. Signs are weakness or numbness in an arm. This happens suddenly and usually on one side of the body.  · S - Speech. Signs are sudden trouble speaking, slurred speech, or trouble understanding what people say.  · T - Time. Time to call emergency services. Write down what time symptoms started.  Other signs of stroke may include:  · A sudden, severe headache with no known cause.  · Nausea or vomiting.  · Seizure.  These symptoms may represent a serious problem that is an emergency. Do not wait to see if the symptoms will go away. Get medical help right away. Call your local emergency services (911 in the [place] Do not drive yourself to the hospital.  Make note of the time that you had your first symptoms. Your emergency responders or emergency room staff will need to know this information.  Summary  · Being discharged from the hospital after a stroke can feel overwhelming. It is normal to feel scared or anxious.  · Make sure you take medicines exactly as told by your health care provider.  · Know the warning signs of a stroke, and get help right way if you have any of these symptoms. \"BE FAST\" is an easy way to remember the main warning signs of a stroke.  This information is not intended to replace advice given to you by your health care provider. Make sure you discuss any questions you have with your health care provider.  Document Revised: 09/09/2020 Document Reviewed: 03/23/2018  [employer]vier Patient Education © 2020 [employer]    Transient Ischemic Attack  A transient ischemic attack (TIA) is a \"warning stroke\" that causes stroke-like symptoms that then go away quickly. The symptoms of a TIA come on suddenly, and they last less than 24 hours. Unlike a stroke, a TIA does not cause permanent damage to the brain. It is important to know the symptoms of a " TIA and what to do. Seek medical care right away, even if your symptoms go away.  Having a TIA is a sign that you are at higher risk for a permanent stroke. Lifestyle changes and medical treatments can help prevent a stroke.  What are the causes?  This condition is caused by a temporary blockage in an artery in the head or neck. The blockage does not allow the brain to get the blood supply it needs and can cause various symptoms. The blockage can be caused by:  · Fatty buildup in an artery in the head or neck (atherosclerosis).  · A blood clot.  · Tearing of an artery (dissection).  · Inflammation of an artery (vasculitis).  Sometimes the cause is not known.  What increases the risk?  Certain factors may make you more likely to develop this condition. Some of these factors are things that you can change, such as:  · Obesity.  · Using products that contain nicotine or tobacco, such as cigarettes and e-cigarettes.  · Taking oral birth control, especially if you also use tobacco.  · Lack of physical activity.  · Excessive use of alcohol.  · Use of drugs, especially cocaine and methamphetamine.  Other risk factors include:  · High blood pressure (hypertension).  · High cholesterol.  · Diabetes mellitus.  · Heart disease (coronary artery disease).  · Atrial fibrillation.  · Being over the age of 60.  · Being male.  · Family history of stroke.  · Previous history of blood clots, stroke, TIA, or heart attack.  · Sickle cell disease.  · Being a woman with a history of preeclampsia.  · Migraine headache.  · Sleep apnea.  · Chronic inflammatory diseases, such as rheumatoid arthritis or lupus.  · Blood clotting disorders (hypercoagulable state).  What are the signs or symptoms?  Symptoms of this condition are the same as those of a stroke, but they are temporary. The symptoms develop suddenly, and they go away quickly, usually within minutes to hours. Symptoms may include sudden:  · Weakness or numbness in your face, arm, or  leg, especially on one side of your body.  · Trouble walking or difficulty moving your arms or legs.  · Trouble speaking, understanding speech, or both (aphasia).  · Vision changes in one or both eyes. These include double vision, blurred vision, or loss of vision.  · Dizziness.  · Confusion.  · Loss of balance or coordination.  · Nausea and vomiting.  · Severe headache with no known cause.  If possible, make note of the exact time that you last felt like your normal self and what time your symptoms started. Tell your health care provider.  How is this diagnosed?  This condition may be diagnosed based on:  · Your symptoms and medical history.  · A physical exam.  · Imaging tests, usually a CT or MRI scan of the brain.  · Blood tests.  You may also have other tests, including:  · Electrocardiogram (ECG).  · Echocardiogram.  · Carotid ultrasound.  · A scan of the brain circulation (CT angiogram or MRI angiogram).  · Continuous heart monitoring.  How is this treated?  The goal of treatment is to reduce the risk for a subsequent stroke. Treatment may include stroke prevention therapies such as:  · Changes to diet or lifestyle to decrease your risk. Lifestyle changes may include exercising and stopping smoking.  · Medicines to thin the blood (antiplatelets or anticoagulants).  · Blood pressure medicines.  · Medicines to reduce cholesterol.  · Treating other health conditions, such as diabetes or atrial fibrillation.  If testing shows that you have narrowing in the arteries to your brain, your health care provider may recommend a procedure, such as:  · Carotid endarterectomy. This is a surgery to remove the blockage from your artery.  · Carotid angioplasty and stenting. This is a procedure to open or widen an artery in the neck using a metal mesh tube (stent). The stent helps keep the artery open by supporting the artery walls.  Follow these instructions at home:  Medicines  · Take over-the-counter and prescription  medicines only as told by your health care provider.  · If you were told to take a medicine to thin your blood, such as aspirin or an anticoagulant, take it exactly as told by your health care provider.  ? Taking too much blood-thinning medicine can cause bleeding.  ? If you do not take enough blood-thinning medicine, you will not have the protection that you need against a stroke and other problems.  Eating and drinking    · Eat 5 or more servings of fruits and vegetables each day.  · Follow instructions from your health care provider about diet. You may need to follow a certain nutrition plan to help manage risk factors for stroke, such as high blood pressure, high cholesterol, diabetes, or obesity. This may include:  ? Eating a low-fat, low-salt diet.  ? Including a lot of fiber in your diet.  ? Limiting the amount of carbohydrates and sugar in your diet.  · Limit alcohol intake to no more than 1 drink a day for nonpregnant women and 2 drinks a day for men. One drink equals 12 oz of beer, 5 oz of wine, or 1½ oz of hard liquor.  General instructions  · Maintain a healthy weight.  · Stay physically active. Try to get at least 30 minutes of exercise on most or all days.  · Find out if you have sleep apnea, and seek treatment if needed.  · Do not use any products that contain nicotine or tobacco, such as cigarettes and e-cigarettes. If you need help quitting, ask your health care provider.  · Do not abuse drugs.  · Keep all follow-up visits as told by your health care provider. This is important.  Where to find more information  · American Stroke Association: www.strokeassociation.org  · National Stroke Association: www.stroke.org  Get help right away if:  · You have chest pain or an irregular heartbeat.  · You have any symptoms of stroke. The acronym BEFAST is an easy way to remember the main warning signs of stroke.  ? B - Balance problems. Signs include dizziness, sudden trouble walking, or loss of balance.  ? E -  Eye problems. This includes trouble seeing or a sudden change in vision.  ? F - Face changes. This includes sudden weakness or numbness of the face, or the face or eyelid drooping to one side.  ? A - Arm weakness or numbness. This happens suddenly and usually on one side of the body.  ? S - Speech problems. This includes trouble speaking or trouble understanding speech.  ? T - Time. Time to call 911 or seek emergency care. Do not wait to see if symptoms will go away. Make note of the time your symptoms started.  · Other signs of stroke may include:  ? A sudden, severe headache with no known cause.  ? Nausea or vomiting.  ? Seizure.  These symptoms may represent a serious problem that is an emergency. Do not wait to see if the symptoms will go away. Get medical help right away. Call your local emergency services (911 in the [place] Do not drive yourself to the hospital.  Summary  · A TIA happens when an artery in the head or neck is blocked, leading to stroke-like symptoms that then go away quickly. The blockage clears before there is any permanent brain damage. A TIA is a medical emergency and requires immediate medical attention.  · Symptoms of this condition are the same as those of a stroke, but they are temporary. The symptoms usually develop suddenly, and they go away quickly, usually within minutes to hours.  · Having a TIA means that you are at high risk of a stroke in the near future.  · Treatment may include medicines to thin the blood as well as medicines, diet changes, and lifestyle changes to manage conditions that increase the risk of another TIA or a stroke.  This information is not intended to replace advice given to you by your health care provider. Make sure you discuss any questions you have with your health care provider.  Document Revised: 06/26/2020 Document Reviewed: 06/26/2020  [employer] Patient Education © 2020 [employer]

## 2021-01-26 ENCOUNTER — OUTSIDE FACILITY SERVICE (OUTPATIENT)
Dept: INTERNAL MEDICINE | Facility: CLINIC | Age: 86
End: 2021-01-26

## 2021-01-26 PROCEDURE — G0179 MD RECERTIFICATION HHA PT: HCPCS | Performed by: FAMILY MEDICINE

## 2021-06-23 ENCOUNTER — OFFICE VISIT (OUTPATIENT)
Dept: INTERNAL MEDICINE | Facility: CLINIC | Age: 86
End: 2021-06-23

## 2021-06-23 VITALS
TEMPERATURE: 97.8 F | OXYGEN SATURATION: 99 % | WEIGHT: 178 LBS | SYSTOLIC BLOOD PRESSURE: 130 MMHG | DIASTOLIC BLOOD PRESSURE: 72 MMHG | HEIGHT: 62 IN | HEART RATE: 63 BPM | BODY MASS INDEX: 32.76 KG/M2

## 2021-06-23 DIAGNOSIS — I25.10 CORONARY ARTERY DISEASE INVOLVING NATIVE CORONARY ARTERY OF NATIVE HEART WITHOUT ANGINA PECTORIS: ICD-10-CM

## 2021-06-23 DIAGNOSIS — E66.09 CLASS 1 OBESITY DUE TO EXCESS CALORIES WITH SERIOUS COMORBIDITY AND BODY MASS INDEX (BMI) OF 32.0 TO 32.9 IN ADULT: ICD-10-CM

## 2021-06-23 DIAGNOSIS — I10 ESSENTIAL HYPERTENSION: ICD-10-CM

## 2021-06-23 DIAGNOSIS — Z00.00 ENCOUNTER FOR SUBSEQUENT ANNUAL WELLNESS VISIT IN MEDICARE PATIENT: Primary | ICD-10-CM

## 2021-06-23 DIAGNOSIS — N18.4 CHRONIC KIDNEY DISEASE, STAGE 4 (SEVERE) (HCC): ICD-10-CM

## 2021-06-23 DIAGNOSIS — E78.5 DYSLIPIDEMIA: ICD-10-CM

## 2021-06-23 DIAGNOSIS — E78.00 PURE HYPERCHOLESTEROLEMIA: ICD-10-CM

## 2021-06-23 DIAGNOSIS — G45.9 TIA (TRANSIENT ISCHEMIC ATTACK): ICD-10-CM

## 2021-06-23 DIAGNOSIS — I48.11 LONGSTANDING PERSISTENT ATRIAL FIBRILLATION (HCC): ICD-10-CM

## 2021-06-23 DIAGNOSIS — D50.9 IRON DEFICIENCY ANEMIA, UNSPECIFIED IRON DEFICIENCY ANEMIA TYPE: ICD-10-CM

## 2021-06-23 DIAGNOSIS — R73.9 HYPERGLYCEMIA: ICD-10-CM

## 2021-06-23 DIAGNOSIS — R53.83 FATIGUE, UNSPECIFIED TYPE: ICD-10-CM

## 2021-06-23 PROCEDURE — G0439 PPPS, SUBSEQ VISIT: HCPCS | Performed by: NURSE PRACTITIONER

## 2021-06-23 RX ORDER — FERROUS SULFATE 325(65) MG
1 TABLET ORAL 3 TIMES WEEKLY
COMMUNITY
Start: 2021-04-19

## 2021-06-23 NOTE — PROGRESS NOTES
The ABCs of the Annual Wellness Visit  Subsequent Medicare Wellness Visit    Chief Complaint   Patient presents with   • Medicare Wellness-subsequent       Subjective   History of Present Illness:  Tanya Elise is a 88 y.o. female who presents for a Subsequent Medicare Wellness Visit.  Reports she has been doing well.  Taking medications as prescribed.  Keeping track of heart healthy, low-carb, low-sodium diet.    HEALTH RISK ASSESSMENT    Recent Hospitalizations:  Recently treated at the following:  [facility]   Admitted to [facility] on 11/14/2020 with TIA, HTN, CKD stage III with acute kidney injury.  She was admitted again on 11/18/2020 due to excessive dizziness, vomiting, acute renal failure.    Current Medical Providers:  Patient Care Team:  Aileen Grande APRN as PCP - General (Family Medicine)    Smoking Status:  Social History     Tobacco Use   Smoking Status Never Smoker   Smokeless Tobacco Never Used       Alcohol Consumption:  Social History     Substance and Sexual Activity   Alcohol Use No       Depression Screen:   PHQ-2/PHQ-9 Depression Screening 6/23/2021   Little interest or pleasure in doing things 0   Feeling down, depressed, or hopeless 0   Trouble falling or staying asleep, or sleeping too much -   Feeling tired or having little energy -   Poor appetite or overeating -   Feeling bad about yourself - or that you are a failure or have let yourself or your family down -   Trouble concentrating on things, such as reading the newspaper or watching television -   Moving or speaking so slowly that other people could have noticed. Or the opposite - being so fidgety or restless that you have been moving around a lot more than usual -   Thoughts that you would be better off dead, or of hurting yourself in some way -   Total Score 0   If you checked off any problems, how difficult have these problems made it for you to do your work, take care of things at home, or get along with  other people? -       Fall Risk Screen:  ALEX Fall Risk Assessment was completed, and patient is at MODERATE risk for falls. Assessment completed on:6/23/2021    Health Habits and Functional and Cognitive Screening:  Functional & Cognitive Status 6/23/2021   Do you have difficulty preparing food and eating? No   Do you have difficulty bathing yourself, getting dressed or grooming yourself? No   Do you have difficulty using the toilet? No   Do you have difficulty moving around from place to place? No   Do you have trouble with steps or getting out of a bed or a chair? Yes   Current Diet Well Balanced Diet   Dental Exam Up to date   Eye Exam Up to date   Exercise (times per week) 0 times per week   Current Exercises Include No Regular Exercise   Current Exercise Activities Include -   Do you need help using the phone?  No   Are you deaf or do you have serious difficulty hearing?  No   Do you need help with transportation? No   Do you need help shopping? Yes   Do you need help preparing meals?  No   Do you need help with housework?  No   Do you need help with laundry? No   Do you need help taking your medications? No   Do you need help managing money? No   Do you ever drive or ride in a car without wearing a seat belt? No   Have you felt unusual stress, anger or loneliness in the last month? No   Who do you live with? Child   If you need help, do you have trouble finding someone available to you? No   Have you been bothered in the last four weeks by sexual problems? No   Do you have difficulty concentrating, remembering or making decisions? Yes         Does the patient have evidence of cognitive impairment? Yes, occasionally loses track of conversation.     Asprin use counseling:Does not need ASA (and currently is not on it)    Age-appropriate Screening Schedule:  Refer to the list below for future screening recommendations based on patient's age, sex and/or medical conditions. Orders for these recommended tests are  listed in the plan section. The patient has been provided with a written plan.    Health Maintenance   Topic Date Due   • DXA SCAN  06/28/2021 (Originally 5/7/1933)   • TDAP/TD VACCINES (1 - Tdap) 06/28/2021 (Originally 5/7/1952)   • ZOSTER VACCINE (1 of 2) 06/28/2021 (Originally [birth year]/7/1983)   • INFLUENZA VACCINE  08/01/2021   • LIPID PANEL  06/23/2022   • MAMMOGRAM  Discontinued          The following portions of the patient's history were reviewed and updated as appropriate: allergies, current medications, past family history, past medical history, past social history, past surgical history and problem list.    Outpatient Medications Prior to Visit   Medication Sig Dispense Refill   • amLODIPine (NORVASC) 5 MG tablet Take 1 tablet by mouth Every Night. 90 tablet 3   • apixaban (ELIQUIS) 2.5 MG tablet tablet Take 1 tablet by mouth Every 12 (Twelve) Hours. Indications: Atrial Fibrillation 60 tablet 6   • baclofen (LIORESAL) 10 MG tablet Take 0.5 tablets by mouth 2 (Two) Times a Day As Needed for Muscle Spasms.     • carbamide peroxide (Debrox) 6.5 % otic solution Administer 5 drops into both ears As Needed for Ear Pain. 15 mL 0   • carvedilol (COREG) 3.125 MG tablet Take 1 tablet by mouth 2 (Two) Times a Day With Meals. 60 tablet 11   • clopidogrel (PLAVIX) 75 MG tablet Take 1 tablet by mouth Daily. 30 tablet 11   • ferrous sulfate 325 (65 FE) MG tablet Take 1 tablet by mouth Daily.     • fexofenadine (ALLEGRA) 60 MG tablet Take 60 mg by mouth Daily As Needed.     • fluticasone (Flonase) 50 MCG/ACT nasal spray 1 spray into the nostril(s) as directed by provider Daily. 18.2 mL 3   • ondansetron (ZOFRAN) 4 MG tablet Take 4 mg by mouth Every 8 (Eight) Hours As Needed for Nausea or Vomiting.     • pravastatin (Pravachol) 40 MG tablet Take 1 tablet by mouth Every Night. For cholesterol. 90 tablet 3   • Vitamin D, Cholecalciferol, (CHOLECALCIFEROL) 10 MCG (400 UNIT) tablet Take 400 Units by mouth Daily.       No  "facility-administered medications prior to visit.       Patient Active Problem List   Diagnosis   • Pure hypercholesterolemia   • Essential hypertension   • Temporary cerebral vascular dysfunction   • Esophageal reflux   • BMI 35.0-35.9,adult   • Diarrhea   • TIA (transient ischemic attack)   • Hypertensive urgency   • Chronic kidney disease, stage III (moderate) (CMS/[facility])   • Acute ST elevation myocardial infarction (STEMI) (CMS/[facility])   • ST elevation myocardial infarction (STEMI) (CMS/[facility])   • Coronary artery disease involving native coronary artery of native heart without angina pectoris   • Dyslipidemia   • Transient ischemic attack   • Left lower quadrant abdominal pain   • Acute kidney injury superimposed on chronic kidney disease (CMS/HCC)   • Acute renal failure (ARF) (CMS/HCC)   • Acute metabolic encephalopathy   • Chronic kidney disease, stage 4 (severe) (CMS/[facility])   • Longstanding persistent atrial fibrillation (CMS/[facility])   • Secondary hyperparathyroidism of renal origin (CMS/[facility])       Advanced Care Planning:  ACP discussion was declined by the patient. Patient does not have an advance directive, declines further assistance.      Compared to one year ago, the patient feels her physical health is better.  Compared to one year ago, the patient feels her mental health is better.    Reviewed chart for potential of high risk medication in the elderly: yes  Reviewed chart for potential of harmful drug interactions in the elderly:yes    Objective         Vitals:    06/23/21 1316   BP: 130/72   Pulse: 63   Temp: 97.8 °F (36.6 °C)   TempSrc: Infrared   SpO2: 99%   Weight: 80.7 kg (178 lb)   Height: 157.5 cm (62\")   PainSc:   8   PainLoc: Back       Body mass index is 32.56 kg/m².  Discussed the patient's BMI with her. The BMI is above average; BMI management plan is completed.    Physical Exam  Vitals and nursing note reviewed.   Constitutional:       General: She is not in acute distress.     Appearance: Normal " appearance. She is well-developed. She is not ill-appearing, toxic-appearing or diaphoretic.   HENT:      Head: Normocephalic and atraumatic.      Right Ear: Tympanic membrane, ear canal and external ear normal.      Left Ear: Tympanic membrane, ear canal and external ear normal.      Nose: No congestion or rhinorrhea.   Eyes:      General: No visual field deficit or scleral icterus.     Extraocular Movements: Extraocular movements intact.      Conjunctiva/sclera: Conjunctivae normal.      Pupils: Pupils are equal, round, and reactive to light.   Neck:      Vascular: No carotid bruit.   Cardiovascular:      Rate and Rhythm: Normal rate and regular rhythm.      Heart sounds: Normal heart sounds. No murmur heard.   No friction rub. No gallop.    Pulmonary:      Effort: Pulmonary effort is normal. No respiratory distress.      Breath sounds: Normal breath sounds. No wheezing, rhonchi or rales.   Chest:      Chest wall: No tenderness.   Abdominal:      General: Bowel sounds are normal. There is no distension.      Palpations: Abdomen is soft.      Tenderness: There is no abdominal tenderness. There is no right CVA tenderness, left CVA tenderness, guarding or rebound.      Hernia: No hernia is present.   Musculoskeletal:         General: No tenderness or deformity.      Cervical back: Normal range of motion and neck supple. No rigidity.      Right lower leg: No edema.      Left lower leg: No edema.   Lymphadenopathy:      Cervical: No cervical adenopathy.   Skin:     General: Skin is warm and dry.      Capillary Refill: Capillary refill takes less than 2 seconds.      Findings: No rash.   Neurological:      General: No focal deficit present.      Mental Status: She is alert and oriented to person, place, and time.      Cranial Nerves: Cranial nerves are intact. No dysarthria or facial asymmetry.      Sensory: Sensation is intact. No sensory deficit.      Motor: Motor function is intact. No weakness, tremor, atrophy,  abnormal muscle tone, seizure activity or pronator drift.      Coordination: Coordination is intact. Romberg sign negative. Coordination normal. Finger-Nose-Finger Test and Heel to Shin Test normal. Rapid alternating movements normal.      Gait: Gait is intact. Gait and tandem walk normal.      Deep Tendon Reflexes: Reflexes are normal and symmetric.      Comments: CN II-XII normal   Psychiatric:         Mood and Affect: Mood normal.         Behavior: Behavior normal.         Thought Content: Thought content normal.         Lab Results   Component Value Date    CHLPL 170 06/23/2021    TRIG 64 06/23/2021    HDL 59 06/23/2021    LDL 99 06/23/2021    VLDL 12 06/23/2021    HGBA1C 5.70 (H) 06/23/2021        Assessment/Plan   Medicare Risks and Personalized Health Plan  CMS Preventative Services Quick Reference  Advance Directive Discussion  Breast Cancer/Mammogram Screening  Cardiovascular risk  Dementia/Memory   Depression/Dysphoria  Fall Risk  Hearing Problem  Immunizations Discussed/Encouraged (specific immunizations; Td, Pneumococcal 23 and Shingrix ) patient refused them all  Inadequate Social Support, Isolation, Loneliness, Lack of Transportation, Financial Difficulties, or Caregiver Stress   Inactivity/Sedentary  Obesity/Overweight   Osteoporosis Risk  Polypharmacy    The above risks/problems have been discussed with the patient.  Pertinent information has been shared with the patient in the After Visit Summary.  Follow up plans and orders are seen below in the Assessment/Plan Section.    Diagnoses and all orders for this visit:    1. Encounter for subsequent annual wellness visit in Medicare patient (Primary)    2. Pure hypercholesterolemia  -     Lipid Panel  - Continue heart healthy diet    3. Essential hypertension        - Follow heart healthy diet.  Advised to reduce daily sodium intake to < 1500 mg per day.  Avoid processed & fast foods.        - Monitor blood pressure as discussed, keep log and bring to  next appointment.          - Exercise as tolerated, with a goal of 30 minutes of moderate exercise most days.  Discussed chair exercises specifically        - Take medications as prescribed.    4. Coronary artery disease involving native coronary artery of native heart without angina pectoris        - Keep blood pressure, cholesterol controlled    5. Dyslipidemia        - Heart healthy diet    6. TIA (transient ischemic attack)        - Monitor for change in LOC, dizziness.  Call 911 should these occur    7. Iron deficiency anemia, unspecified iron deficiency anemia type  -     CBC (No Diff)  -     Vitamin B12  -     Iron and TIBC  -     Ferritin    8. Hyperglycemia  -     Hemoglobin A1c    9. Fatigue, unspecified type  -     CBC (No Diff)  -     Vitamin B12  -     Iron and TIBC  -     Ferritin    10. Chronic kidney disease, stage 4 (severe) (CMS/[facility])         - Follow-up with Dr. Be, nephrologist    11. Longstanding persistent atrial fibrillation (CMS/[facility])         -  Follow-up with Dr. Franco, cardiologist, continue taking medications as prescribed    12. Class 1 obesity due to excess calories with serious comorbidity and body mass index (BMI) of 32.0 to 32.9 in adult         - She has lost 8 pounds in the past 5 months, purposefully.         - Patient's (Body mass index is 32.56 kg/m².) indicates that they are obese (BMI >30) with obesity-related health conditions that include hypertension, coronary heart disease and dyslipidemias . Obesity is improving with lifestyle modifications. BMI is is above average; BMI management plan is completed. We discussed low calorie, low carb based diet program, portion control and increasing exercise.       Follow Up:  Return in about 6 months (around 12/23/2021) for Next scheduled follow up.     An After Visit Summary and PPPS were given to the patient.

## 2021-06-24 LAB
CHOLEST SERPL-MCNC: 170 MG/DL (ref 0–200)
ERYTHROCYTE [DISTWIDTH] IN BLOOD BY AUTOMATED COUNT: 12.8 % (ref 12.3–15.4)
FERRITIN SERPL-MCNC: 320 NG/ML (ref 13–150)
HBA1C MFR BLD: 5.7 % (ref 4.8–5.6)
HCT VFR BLD AUTO: 31.4 % (ref 34–46.6)
HDLC SERPL-MCNC: 59 MG/DL (ref 40–60)
HGB BLD-MCNC: 10.5 G/DL (ref 12–15.9)
IRON SATN MFR SERPL: 20 % (ref 20–50)
IRON SERPL-MCNC: 67 MCG/DL (ref 37–145)
LDLC SERPL CALC-MCNC: 99 MG/DL (ref 0–100)
MCH RBC QN AUTO: 32.1 PG (ref 26.6–33)
MCHC RBC AUTO-ENTMCNC: 33.4 G/DL (ref 31.5–35.7)
MCV RBC AUTO: 96 FL (ref 79–97)
PLATELET # BLD AUTO: 210 10*3/MM3 (ref 140–450)
RBC # BLD AUTO: 3.27 10*6/MM3 (ref 3.77–5.28)
TIBC SERPL-MCNC: 328 MCG/DL
TRIGL SERPL-MCNC: 64 MG/DL (ref 0–150)
UIBC SERPL-MCNC: 261 MCG/DL (ref 112–346)
VIT B12 SERPL-MCNC: 403 PG/ML (ref 211–946)
VLDLC SERPL CALC-MCNC: 12 MG/DL (ref 5–40)
WBC # BLD AUTO: 5.65 10*3/MM3 (ref 3.4–10.8)

## 2021-06-28 PROBLEM — N25.81 SECONDARY HYPERPARATHYROIDISM OF RENAL ORIGIN: Status: ACTIVE | Noted: 2021-06-28

## 2021-06-28 PROBLEM — N18.4 CHRONIC KIDNEY DISEASE, STAGE 4 (SEVERE): Status: ACTIVE | Noted: 2021-06-28

## 2021-06-28 PROBLEM — I48.11 LONGSTANDING PERSISTENT ATRIAL FIBRILLATION: Status: ACTIVE | Noted: 2021-06-28

## 2021-07-27 DIAGNOSIS — G45.9 TIA (TRANSIENT ISCHEMIC ATTACK): ICD-10-CM

## 2021-07-27 DIAGNOSIS — I48.91 ATRIAL FIBRILLATION, UNSPECIFIED TYPE (HCC): ICD-10-CM

## 2021-07-27 RX ORDER — APIXABAN 2.5 MG/1
TABLET, FILM COATED ORAL
Qty: 60 TABLET | Refills: 6 | Status: SHIPPED | OUTPATIENT
Start: 2021-07-27 | End: 2022-02-28

## 2021-07-28 ENCOUNTER — TELEPHONE (OUTPATIENT)
Dept: INTERNAL MEDICINE | Facility: CLINIC | Age: 86
End: 2021-07-28

## 2021-09-10 DIAGNOSIS — E78.00 PURE HYPERCHOLESTEROLEMIA: ICD-10-CM

## 2021-09-10 DIAGNOSIS — M62.830 BACK MUSCLE SPASM: ICD-10-CM

## 2021-09-10 RX ORDER — PRAVASTATIN SODIUM 40 MG
TABLET ORAL
Qty: 90 TABLET | Refills: 3 | Status: SHIPPED | OUTPATIENT
Start: 2021-09-10 | End: 2022-09-09

## 2021-09-10 RX ORDER — BACLOFEN 10 MG/1
TABLET ORAL
Qty: 180 TABLET | Refills: 0 | Status: SHIPPED | OUTPATIENT
Start: 2021-09-10 | End: 2022-04-04 | Stop reason: SDUPTHER

## 2021-10-07 RX ORDER — CARVEDILOL 3.12 MG/1
3.12 TABLET ORAL 2 TIMES DAILY WITH MEALS
Qty: 60 TABLET | Refills: 11 | Status: SHIPPED | OUTPATIENT
Start: 2021-10-07 | End: 2022-09-07 | Stop reason: SDUPTHER

## 2021-10-07 RX ORDER — CARVEDILOL 3.12 MG/1
3.12 TABLET ORAL 2 TIMES DAILY WITH MEALS
Qty: 60 TABLET | Refills: 11 | Status: SHIPPED | OUTPATIENT
Start: 2021-10-07 | End: 2021-10-07

## 2021-10-07 RX ORDER — CLOPIDOGREL BISULFATE 75 MG/1
75 TABLET ORAL DAILY
Qty: 30 TABLET | Refills: 11 | Status: SHIPPED | OUTPATIENT
Start: 2021-10-07 | End: 2021-10-07

## 2021-10-07 RX ORDER — CLOPIDOGREL BISULFATE 75 MG/1
75 TABLET ORAL DAILY
Qty: 30 TABLET | Refills: 11 | Status: SHIPPED | OUTPATIENT
Start: 2021-10-07 | End: 2022-10-03

## 2021-12-06 ENCOUNTER — OFFICE VISIT (OUTPATIENT)
Dept: CARDIOLOGY | Facility: CLINIC | Age: 86
End: 2021-12-06

## 2021-12-06 VITALS
HEIGHT: 62 IN | HEART RATE: 55 BPM | WEIGHT: 175 LBS | DIASTOLIC BLOOD PRESSURE: 70 MMHG | BODY MASS INDEX: 32.2 KG/M2 | OXYGEN SATURATION: 97 % | SYSTOLIC BLOOD PRESSURE: 128 MMHG

## 2021-12-06 DIAGNOSIS — I25.10 CORONARY ARTERY DISEASE INVOLVING NATIVE CORONARY ARTERY OF NATIVE HEART WITHOUT ANGINA PECTORIS: Primary | ICD-10-CM

## 2021-12-06 DIAGNOSIS — I48.11 LONGSTANDING PERSISTENT ATRIAL FIBRILLATION (HCC): ICD-10-CM

## 2021-12-06 DIAGNOSIS — G45.9 TIA (TRANSIENT ISCHEMIC ATTACK): ICD-10-CM

## 2021-12-06 DIAGNOSIS — E78.5 DYSLIPIDEMIA: ICD-10-CM

## 2021-12-06 DIAGNOSIS — I10 ESSENTIAL HYPERTENSION: ICD-10-CM

## 2021-12-06 PROCEDURE — 99214 OFFICE O/P EST MOD 30 MIN: CPT | Performed by: NURSE PRACTITIONER

## 2021-12-06 NOTE — PROGRESS NOTES
"             [facility] Cardiology Office Follow Up Note    Tanya Elise  [id number]  12/06/2021    Primary Care Provider: Aileen Grande APRN   Referring Provider: No ref. provider found    Chief Complaint: Regular follow-up    History of Present Illness:   Mrs. Tanya Elise is a 88 y.o. female who presents to the Cardiology Clinic for regular follow-up.  She has a past medical history of coronary artery disease, hypertension, hyperlipidemia, persistent atrial fibrillation, chronic kidney disease, and TIA.  She presents today for regular follow-up.  Patient reports feeling well cardiac standpoint today.  She is able to maintain a relatively active lifestyle and was cleaning off her porch yesterday and the day before.  She does say that several months ago she experienced a discomfort on the right side of her chest which she describes as a \"pinch\" that lasted just a couple seconds.  She cannot remember if she had any associated symptoms and denies being concerned about it at that time.  She denies any recurrence of this sensation.  Additionally, she denies dyspnea, palpitations, dizziness, syncope.  She does report occasional lower extremity edema, but notes its \"not that bad\".  She continues to take Plavix and Eliquis with no significant bruising or bleeding.  She offers no other complaints or concerns at this time.    Review of Systems:   Review of Systems   Constitutional: Negative for activity change, chills, diaphoresis, fatigue, fever and unexpected weight gain.   Eyes: Negative for blurred vision and visual disturbance.   Respiratory: Negative for apnea, cough, chest tightness, shortness of breath and wheezing.    Cardiovascular: Negative for chest pain, palpitations and leg swelling.   Gastrointestinal: Negative for abdominal distention, blood in stool, GERD and indigestion.   Endocrine: Negative for cold intolerance and heat intolerance.   Genitourinary: Negative for " hematuria.   Musculoskeletal: Negative for gait problem, joint swelling and myalgias.   Skin: Negative for color change, pallor and bruise.   Neurological: Negative for dizziness, seizures, syncope, weakness, light-headedness, numbness, headache and confusion.   Hematological: Does not bruise/bleed easily.   Psychiatric/Behavioral: Negative for behavioral problems, sleep disturbance, suicidal ideas and depressed mood.     I have reviewed and confirmed the accuracy of the ROS as documented by the MA/LPN/RN ESTEPHANIE Issa    I have reviewed and/or updated the patient's past medical, past surgical, family, social history, problem list and allergies as appropriate.     Medications:     Current Outpatient Medications:   •  amLODIPine (NORVASC) 5 MG tablet, Take 1 tablet by mouth Every Night., Disp: 90 tablet, Rfl: 3  •  baclofen (LIORESAL) 10 MG tablet, TAKE 1/2 TABLET BY MOUTH TWICE DAILY, Disp: 180 tablet, Rfl: 0  •  carbamide peroxide (Debrox) 6.5 % otic solution, Administer 5 drops into both ears As Needed for Ear Pain., Disp: 15 mL, Rfl: 0  •  carvedilol (COREG) 3.125 MG tablet, Take 1 tablet by mouth 2 (Two) Times a Day With Meals., Disp: 60 tablet, Rfl: 11  •  clopidogrel (PLAVIX) 75 MG tablet, Take 1 tablet by mouth Daily., Disp: 30 tablet, Rfl: 11  •  Eliquis 2.5 MG tablet tablet, TAKE 1 TABLET BY MOUTH EVERY 12 HOURS FOR ATRIAL FIBRILLATION, Disp: 60 tablet, Rfl: 6  •  ferrous sulfate 325 (65 FE) MG tablet, Take 1 tablet by mouth 3 (Three) Times a Week., Disp: , Rfl:   •  fexofenadine (ALLEGRA) 60 MG tablet, Take 60 mg by mouth Daily As Needed., Disp: , Rfl:   •  ondansetron (ZOFRAN) 4 MG tablet, Take 4 mg by mouth Every 8 (Eight) Hours As Needed for Nausea or Vomiting., Disp: , Rfl:   •  pravastatin (PRAVACHOL) 40 MG tablet, TAKE 1 TABLET BY MOUTH AT BEDTIME, Disp: 90 tablet, Rfl: 3  •  Vitamin D, Cholecalciferol, (CHOLECALCIFEROL) 10 MCG (400 UNIT) tablet, Take 400 Units by mouth Daily., Disp: ,  "Rfl:     Physical Exam:  Vital Signs:   Vitals:    12/06/21 1305 12/06/21 1319   BP: 160/82 128/70   BP Location: Left arm Right arm   Patient Position: Sitting    Cuff Size: Adult    Pulse: 55    SpO2: 97%    Weight: 79.4 kg (175 lb)    Height: 157.5 cm (62\")      Body mass index is 32.01 kg/m².    Physical Exam  Vitals and nursing note reviewed.   Constitutional:       General: She is not in acute distress.     Appearance: Normal appearance. She is well-developed.      Comments: Elderly-appearing, ambulates with single posted cane on right side, BMI 32 kg/m²   HENT:      Head: Normocephalic and atraumatic.   Eyes:      General: No scleral icterus.     Extraocular Movements: Extraocular movements intact.   Neck:      Trachea: Trachea normal.   Cardiovascular:      Rate and Rhythm: Normal rate and regular rhythm.      Pulses: Normal pulses.      Heart sounds: Murmur heard.   No friction rub. No gallop.       Comments: Soft systolic murmur  Pulmonary:      Effort: Pulmonary effort is normal.      Breath sounds: Normal breath sounds. No wheezing or rhonchi.   Abdominal:      Palpations: Abdomen is soft.      Tenderness: There is no abdominal tenderness.   Musculoskeletal:         General: Normal range of motion.      Cervical back: Neck supple.      Right lower leg: Edema present.      Left lower leg: Edema present.      Comments: Trace indentation of her sock line   Skin:     General: Skin is warm and dry.      Findings: No bruising, lesion or rash.   Neurological:      Mental Status: She is alert and oriented to person, place, and time.      Motor: No weakness.      Gait: Gait normal.   Psychiatric:         Mood and Affect: Mood normal.         Behavior: Behavior normal. Behavior is cooperative.         Thought Content: Thought content does not include suicidal ideation.         Results Review:   I reviewed the patient's new clinical results.      Assessment / Plan:     1. Coronary artery disease involving native " coronary artery of native heart without angina pectoris (Primary)  --Stable and angina free    2. Paroxysmal atrial fibrillation (HCC)  --No symptoms that would indicate recurrent atrial fibrillation    3. Essential hypertension  --Acceptable BP on reassessment  --Encourage patient to resume home monitoring and call us with any persistent SBP's >140     4. Dyslipidemia  --6/21, LDL 99  --Continue statin as tolerated    5. TIA (transient ischemic attack)  --No reported recurrence      Preventative Cardiology:   Tobacco Cessation: N/A   Advance Care Planning: ACP discussion was held with the patient during this visit. Patient does not have an advance directive, information provided.     Follow Up:   Return in about 1 year (around 12/6/2022) for Follow-up with Dr. Franco.      Thank you for allowing me to participate in the care of your patient. Please to not hesitate to contact me with additional questions or concerns.     Maday Alvarez, APRN

## 2021-12-27 DIAGNOSIS — E78.00 PURE HYPERCHOLESTEROLEMIA: ICD-10-CM

## 2021-12-27 RX ORDER — AMLODIPINE BESYLATE 5 MG/1
5 TABLET ORAL NIGHTLY
Qty: 90 TABLET | Refills: 3 | Status: SHIPPED | OUTPATIENT
Start: 2021-12-27 | End: 2023-01-03

## 2022-02-28 DIAGNOSIS — G45.9 TIA (TRANSIENT ISCHEMIC ATTACK): ICD-10-CM

## 2022-02-28 DIAGNOSIS — I48.91 ATRIAL FIBRILLATION, UNSPECIFIED TYPE: ICD-10-CM

## 2022-02-28 RX ORDER — APIXABAN 2.5 MG/1
TABLET, FILM COATED ORAL
Qty: 60 TABLET | Refills: 11 | Status: SHIPPED | OUTPATIENT
Start: 2022-02-28 | End: 2023-03-03

## 2022-03-06 DIAGNOSIS — M62.830 BACK MUSCLE SPASM: ICD-10-CM

## 2022-03-07 DIAGNOSIS — M62.830 BACK MUSCLE SPASM: ICD-10-CM

## 2022-03-07 RX ORDER — BACLOFEN 10 MG/1
TABLET ORAL
Qty: 180 TABLET | Refills: 0 | OUTPATIENT
Start: 2022-03-07

## 2022-03-07 NOTE — TELEPHONE ENCOUNTER
Rx Refill Note  Requested Prescriptions     Pending Prescriptions Disp Refills   • baclofen (LIORESAL) 10 MG tablet [Pharmacy Med Name: BACLOFEN 10MG TABLETS] 180 tablet 0     Sig: TAKE 1/2 TABLET BY MOUTH TWICE DAILY      Last office visit with prescribing clinician: Visit date not found      Next office visit with prescribing clinician: 3/7/2022            Preeti Duran LPN  03/07/22, 09:47 EST

## 2022-04-04 ENCOUNTER — TELEPHONE (OUTPATIENT)
Dept: INTERNAL MEDICINE | Facility: CLINIC | Age: 87
End: 2022-04-04

## 2022-04-04 DIAGNOSIS — Q84.6 DYSPLASIA OF TOENAIL: Primary | ICD-10-CM

## 2022-04-04 DIAGNOSIS — M62.830 BACK MUSCLE SPASM: ICD-10-CM

## 2022-04-04 RX ORDER — BACLOFEN 10 MG/1
5 TABLET ORAL 2 TIMES DAILY
Qty: 180 TABLET | Refills: 1 | Status: SHIPPED | OUTPATIENT
Start: 2022-04-04 | End: 2023-03-21

## 2022-04-04 NOTE — TELEPHONE ENCOUNTER
Pt has been taking one in am and one in pm, she has not stopped the rx, she would like a refill for that dose, she is going to schedule an appt soon

## 2022-07-06 ENCOUNTER — OFFICE VISIT (OUTPATIENT)
Dept: INTERNAL MEDICINE | Facility: CLINIC | Age: 87
End: 2022-07-06

## 2022-07-06 VITALS
SYSTOLIC BLOOD PRESSURE: 130 MMHG | OXYGEN SATURATION: 97 % | HEIGHT: 62 IN | HEART RATE: 62 BPM | BODY MASS INDEX: 33.13 KG/M2 | TEMPERATURE: 97.1 F | DIASTOLIC BLOOD PRESSURE: 60 MMHG | WEIGHT: 180 LBS

## 2022-07-06 DIAGNOSIS — B37.2 CANDIDAL INTERTRIGO: Primary | ICD-10-CM

## 2022-07-06 DIAGNOSIS — I10 ESSENTIAL HYPERTENSION: ICD-10-CM

## 2022-07-06 DIAGNOSIS — I48.11 LONGSTANDING PERSISTENT ATRIAL FIBRILLATION: ICD-10-CM

## 2022-07-06 DIAGNOSIS — N18.4 CHRONIC KIDNEY DISEASE, STAGE 4 (SEVERE): ICD-10-CM

## 2022-07-06 PROCEDURE — 99214 OFFICE O/P EST MOD 30 MIN: CPT | Performed by: NURSE PRACTITIONER

## 2022-07-06 RX ORDER — NYSTATIN AND TRIAMCINOLONE ACETONIDE 100000; 1 [USP'U]/G; MG/G
1 OINTMENT TOPICAL 2 TIMES DAILY PRN
Qty: 60 G | Refills: 1 | Status: SHIPPED | OUTPATIENT
Start: 2022-07-06 | End: 2023-04-05

## 2022-07-06 NOTE — PROGRESS NOTES
Office Visit      Patient Name: Tanya Elise  : 1933   MRN: [id number]     Chief Complaint:    Chief Complaint   Patient presents with   • Rash     On lower abdomen, burning       History of Present Illness: Tanya Elise is a 89 y.o. female who is here today with reports of a rash beneath her abdominal fold that has been present for over a week. She has been cleaning the area with hydrogen peroxide, burns slightly.  Has been wearing nylon underpants, rather than her normal cotton underwear.  Rash is occasionally itchy.      Reports pain at the top of both hips.  Improved since she made this appointment.      Sees nephrologist, CKD.  Believes she should not drink tomato juice, might interfere with a medication she is taking.  Does not eat pizza, marinara sauce, salsa, ketchup.     HTN, Afib: Takes amlodipine, carvedilol, Plavix, Eliquis as prescribed.  Tries eat a heart healthy diet.  Does chair exercises most days of the week. Denies chest pain, dyspnea, orthopnea, palpitations, lower extremity edema, headaches, weakness, visual disturbances.    Subjective      I have reviewed and the following portions of the patient's history were updated as appropriate: past family history, past medical history, past social history, past surgical history and problem list.      Current Outpatient Medications:   •  amLODIPine (NORVASC) 5 MG tablet, Take 1 tablet by mouth Every Night., Disp: 90 tablet, Rfl: 3  •  baclofen (LIORESAL) 10 MG tablet, Take 0.5 tablets by mouth 2 (Two) Times a Day., Disp: 180 tablet, Rfl: 1  •  carbamide peroxide (Debrox) 6.5 % otic solution, Administer 5 drops into both ears As Needed for Ear Pain., Disp: 15 mL, Rfl: 0  •  carvedilol (COREG) 3.125 MG tablet, Take 1 tablet by mouth 2 (Two) Times a Day With Meals., Disp: 60 tablet, Rfl: 11  •  clopidogrel (PLAVIX) 75 MG tablet, Take 1 tablet by mouth Daily., Disp: 30 tablet, Rfl: 11  •  Eliquis 2.5 MG tablet tablet, TAKE 1  "TABLET BY MOUTH EVERY 12 HOURS FOR ATRIAL FIBRILLATION, Disp: 60 tablet, Rfl: 11  •  ferrous sulfate 325 (65 FE) MG tablet, Take 1 tablet by mouth 3 (Three) Times a Week., Disp: , Rfl:   •  fexofenadine (ALLEGRA) 60 MG tablet, Take 60 mg by mouth Daily As Needed., Disp: , Rfl:   •  ondansetron (ZOFRAN) 4 MG tablet, Take 4 mg by mouth Every 8 (Eight) Hours As Needed for Nausea or Vomiting., Disp: , Rfl:   •  pravastatin (PRAVACHOL) 40 MG tablet, TAKE 1 TABLET BY MOUTH AT BEDTIME, Disp: 90 tablet, Rfl: 3  •  Vitamin D, Cholecalciferol, (CHOLECALCIFEROL) 10 MCG (400 UNIT) tablet, Take 400 Units by mouth Daily., Disp: , Rfl:   •  nystatin-triamcinolone (MYCOLOG) 580213-8.1 UNIT/GM-% ointment, Apply 1 application topically to the appropriate area as directed 2 (Two) Times a Day As Needed (rash, itching)., Disp: 60 g, Rfl: 1    Allergies   Allergen Reactions   • Crestor [Rosuvastatin Calcium] Headache   • Lipitor [Atorvastatin Calcium] Myalgia   • Penicillins Rash       Objective     Physical Exam:  Vital Signs:   Vitals:    07/06/22 1437   BP: 130/60   Pulse: 62   Temp: 97.1 °F (36.2 °C)   SpO2: 97%   Weight: 81.6 kg (180 lb)   Height: 157.5 cm (62.01\")     Body mass index is 32.91 kg/m².    Physical Exam  Constitutional:       Appearance: She is not ill-appearing.   HENT:      Head: Normocephalic.      Right Ear: External ear normal.      Left Ear: External ear normal.   Eyes:      Conjunctiva/sclera: Conjunctivae normal.      Pupils: Pupils are equal, round, and reactive to light.   Cardiovascular:      Rate and Rhythm: Normal rate and regular rhythm.      Pulses:           Radial pulses are 2+ on the right side and 2+ on the left side.        Dorsalis pedis pulses are 2+ on the right side and 2+ on the left side.      Heart sounds: Normal heart sounds.   Pulmonary:      Effort: Pulmonary effort is normal.      Breath sounds: Normal breath sounds.   Musculoskeletal:      Cervical back: Normal range of motion and neck " supple.      Right lower leg: No edema.      Left lower leg: No edema.   Skin:     General: Skin is warm.      Capillary Refill: Capillary refill takes less than 2 seconds.      Comments: Beefy red maculopapular rash beneath pannus   Neurological:      Mental Status: She is alert and oriented to person, place, and time.      Coordination: Coordination normal.      Gait: Gait normal.   Psychiatric:         Mood and Affect: Mood normal.         Behavior: Behavior normal.         Thought Content: Thought content normal.         Assessment / Plan      Assessment/Plan:   Diagnoses and all orders for this visit:    1. Candidal intertrigo (Primary)  -     nystatin-triamcinolone (MYCOLOG) 279528-2.1 UNIT/GM-% ointment; Apply 1 application topically to the appropriate area as directed 2 (Two) Times a Day As Needed (rash, itching).  Dispense: 60 g; Refill: 1  - Stop using hydrogen peroxide, patient verbalizes understanding  - Wear cotton underwear  - Wash area once a day with antibacterial soap.  Pat completely dry before dressing    2. Essential hypertension        - Follow heart healthy diet.  Keep sodium intake < 1500 mg per day.  Avoid processed & fast foods.          - Exercise as tolerated, with a goal of 30 minutes of exercise most days. Advised she is eligible for Silver Sneakers.          - Take medications as prescribed.    3. Longstanding persistent atrial fibrillation (HCC)        - Continue medications as prescribed    4. Chronic kidney disease, stage 4 (severe) (HCC)        - Follow-up with nephrology, keep blood pressure well controlled.      Follow Up:   Return in about 4 weeks (around 8/3/2022) for Medicare Wellness.    Patient was given instructions and counseling regarding her condition or for health maintenance advice. Please see specific information pulled into the AVS if appropriate.       [facility] [address] [facility]     Please note that portions of this note may have been completed with a voice  recognition program. Efforts were made to edit dictation, but occasionally words are mistranscribed.

## 2022-07-08 ENCOUNTER — TELEPHONE (OUTPATIENT)
Dept: INTERNAL MEDICINE | Facility: CLINIC | Age: 87
End: 2022-07-08

## 2022-07-08 NOTE — TELEPHONE ENCOUNTER
Caller: Tanya Elise    Relationship to patient: Self    Best call back number:[phone]    Patient is needing: PATIENT STATED THAT SHE WOULD LIKE TO KNOW HOW LONG SHE WOULD NEED TO CONTINUE APPLYING OINTMENT FOR YEAST INFECTION    PLEASE ADVISE ASAP

## 2022-07-27 ENCOUNTER — TELEPHONE (OUTPATIENT)
Dept: INTERNAL MEDICINE | Facility: CLINIC | Age: 87
End: 2022-07-27

## 2022-07-27 DIAGNOSIS — R39.9 UTI SYMPTOMS: Primary | ICD-10-CM

## 2022-07-27 NOTE — TELEPHONE ENCOUNTER
Caller: Tanya Elise    Relationship: Self    Best call back number: [phone]    What medication are you requesting: YEAST INFECTION MEDICATION     What are your current symptoms: YEAST AND ITCHING  AROUND THE OPENING OF THE VAGINA      How long have you been experiencing symptoms: ONE WEEK     Have you had these symptoms before:    [x] Yes  [] No    Have you been treated for these symptoms before:   [x] Yes  [] No    If a prescription is needed, what is your preferred pharmacy and phone number: [id number] [address] [facility] [facility] [address] [place] [facility] - [phone]  - [phone]      Additional notes:

## 2022-07-28 NOTE — TELEPHONE ENCOUNTER
PATIENT CALLED BACK IN TO LET THE STAFF KNOW THAT SHE THINKS IT MIGHT BE A BLADDER ISSUE. REQUESTING A CALL BACK AS SOON AS POSSIBLE.

## 2022-07-29 ENCOUNTER — CLINICAL SUPPORT (OUTPATIENT)
Dept: INTERNAL MEDICINE | Facility: CLINIC | Age: 87
End: 2022-07-29

## 2022-07-29 DIAGNOSIS — R39.9 UTI SYMPTOMS: ICD-10-CM

## 2022-07-29 LAB
BILIRUB BLD-MCNC: ABNORMAL MG/DL
CLARITY, POC: ABNORMAL
COLOR UR: YELLOW
EXPIRATION DATE: ABNORMAL
GLUCOSE UR STRIP-MCNC: NEGATIVE MG/DL
KETONES UR QL: NEGATIVE
LEUKOCYTE EST, POC: NEGATIVE
Lab: ABNORMAL
NITRITE UR-MCNC: NEGATIVE MG/ML
PH UR: 5.5 [PH] (ref 5–8)
PROT UR STRIP-MCNC: ABNORMAL MG/DL
RBC # UR STRIP: NEGATIVE /UL
SP GR UR: 1.02 (ref 1–1.03)
UROBILINOGEN UR QL: NORMAL

## 2022-07-29 PROCEDURE — 81003 URINALYSIS AUTO W/O SCOPE: CPT | Performed by: NURSE PRACTITIONER

## 2022-07-31 LAB
BACTERIA UR CULT: NORMAL
BACTERIA UR CULT: NORMAL

## 2022-08-03 ENCOUNTER — HOSPITAL ENCOUNTER (OUTPATIENT)
Dept: GENERAL RADIOLOGY | Facility: HOSPITAL | Age: 87
Discharge: HOME | End: 2022-08-03
Admitting: NURSE PRACTITIONER

## 2022-08-03 ENCOUNTER — OFFICE VISIT (OUTPATIENT)
Dept: INTERNAL MEDICINE | Facility: CLINIC | Age: 87
End: 2022-08-03

## 2022-08-03 VITALS
TEMPERATURE: 97.1 F | HEART RATE: 59 BPM | SYSTOLIC BLOOD PRESSURE: 140 MMHG | HEIGHT: 62 IN | BODY MASS INDEX: 32.76 KG/M2 | OXYGEN SATURATION: 97 % | DIASTOLIC BLOOD PRESSURE: 70 MMHG | WEIGHT: 178 LBS

## 2022-08-03 DIAGNOSIS — R53.83 FATIGUE, UNSPECIFIED TYPE: ICD-10-CM

## 2022-08-03 DIAGNOSIS — M54.6 CHRONIC BILATERAL THORACIC BACK PAIN: ICD-10-CM

## 2022-08-03 DIAGNOSIS — I48.11 LONGSTANDING PERSISTENT ATRIAL FIBRILLATION: ICD-10-CM

## 2022-08-03 DIAGNOSIS — G89.29 CHRONIC BILATERAL THORACIC BACK PAIN: ICD-10-CM

## 2022-08-03 DIAGNOSIS — Z13.820 ENCOUNTER FOR OSTEOPOROSIS SCREENING IN ASYMPTOMATIC POSTMENOPAUSAL PATIENT: ICD-10-CM

## 2022-08-03 DIAGNOSIS — Z00.00 ENCOUNTER FOR SUBSEQUENT ANNUAL WELLNESS VISIT (AWV) IN MEDICARE PATIENT: Primary | ICD-10-CM

## 2022-08-03 DIAGNOSIS — I10 ESSENTIAL HYPERTENSION: ICD-10-CM

## 2022-08-03 DIAGNOSIS — E66.09 CLASS 1 OBESITY DUE TO EXCESS CALORIES WITH SERIOUS COMORBIDITY AND BODY MASS INDEX (BMI) OF 32.0 TO 32.9 IN ADULT: ICD-10-CM

## 2022-08-03 DIAGNOSIS — N25.81 SECONDARY HYPERPARATHYROIDISM OF RENAL ORIGIN: ICD-10-CM

## 2022-08-03 DIAGNOSIS — I25.10 CORONARY ARTERY DISEASE INVOLVING NATIVE CORONARY ARTERY OF NATIVE HEART WITHOUT ANGINA PECTORIS: ICD-10-CM

## 2022-08-03 DIAGNOSIS — R73.9 HYPERGLYCEMIA: ICD-10-CM

## 2022-08-03 DIAGNOSIS — Z78.0 ENCOUNTER FOR OSTEOPOROSIS SCREENING IN ASYMPTOMATIC POSTMENOPAUSAL PATIENT: ICD-10-CM

## 2022-08-03 DIAGNOSIS — E78.00 PURE HYPERCHOLESTEROLEMIA: ICD-10-CM

## 2022-08-03 DIAGNOSIS — G45.9 TRANSIENT ISCHEMIC ATTACK: ICD-10-CM

## 2022-08-03 DIAGNOSIS — N18.4 CHRONIC KIDNEY DISEASE, STAGE 4 (SEVERE): ICD-10-CM

## 2022-08-03 PROCEDURE — 1170F FXNL STATUS ASSESSED: CPT | Performed by: NURSE PRACTITIONER

## 2022-08-03 PROCEDURE — G0439 PPPS, SUBSEQ VISIT: HCPCS | Performed by: NURSE PRACTITIONER

## 2022-08-03 PROCEDURE — 1160F RVW MEDS BY RX/DR IN RCRD: CPT | Performed by: NURSE PRACTITIONER

## 2022-08-03 PROCEDURE — 72072 X-RAY EXAM THORAC SPINE 3VWS: CPT

## 2022-08-03 NOTE — PROGRESS NOTES
"The ABCs of the Annual Wellness Visit  Subsequent Medicare Wellness Visit    Chief Complaint   Patient presents with   • Medicare Wellness-subsequent      Subjective    History of Present Illness:  Tanya Elise is a 89 y.o. female who presents for a Subsequent Medicare Wellness Visit.    Continues to be constipated.  Miralax caused a \"gaggy\" feeling, was not effective.  She is back stable hydrated, eat cruciferous vegetables.    Thoracic back pain continues and has slightly worsened.     The following portions of the patient's history were reviewed and   updated as appropriate: allergies, current medications, past family history, past medical history, past social history, past surgical history and problem list.    Compared to one year ago, the patient feels her physical   health is better.    Compared to one year ago, the patient feels her mental   health is better.    Recent Hospitalizations:  She was not admitted to the hospital during the last year.       Current Medical Providers:  Patient Care Team:  Aileen Grande APRN as PCP - General (Family Medicine)    Outpatient Medications Prior to Visit   Medication Sig Dispense Refill   • amLODIPine (NORVASC) 5 MG tablet Take 1 tablet by mouth Every Night. 90 tablet 3   • baclofen (LIORESAL) 10 MG tablet Take 0.5 tablets by mouth 2 (Two) Times a Day. 180 tablet 1   • carbamide peroxide (Debrox) 6.5 % otic solution Administer 5 drops into both ears As Needed for Ear Pain. 15 mL 0   • carvedilol (COREG) 3.125 MG tablet Take 1 tablet by mouth 2 (Two) Times a Day With Meals. 60 tablet 11   • clopidogrel (PLAVIX) 75 MG tablet Take 1 tablet by mouth Daily. 30 tablet 11   • Eliquis 2.5 MG tablet tablet TAKE 1 TABLET BY MOUTH EVERY 12 HOURS FOR ATRIAL FIBRILLATION 60 tablet 11   • ferrous sulfate 325 (65 FE) MG tablet Take 1 tablet by mouth 3 (Three) Times a Week.     • fexofenadine (ALLEGRA) 60 MG tablet Take 60 mg by mouth Daily As Needed.     • " nystatin-triamcinolone (MYCOLOG) 922678-7.1 UNIT/GM-% ointment Apply 1 application topically to the appropriate area as directed 2 (Two) Times a Day As Needed (rash, itching). 60 g 1   • ondansetron (ZOFRAN) 4 MG tablet Take 4 mg by mouth Every 8 (Eight) Hours As Needed for Nausea or Vomiting.     • pravastatin (PRAVACHOL) 40 MG tablet TAKE 1 TABLET BY MOUTH AT BEDTIME 90 tablet 3   • Vitamin D, Cholecalciferol, (CHOLECALCIFEROL) 10 MCG (400 UNIT) tablet Take 400 Units by mouth Daily.       No facility-administered medications prior to visit.       No opioid medication identified on active medication list. I have reviewed chart for other potential  high risk medication/s and harmful drug interactions in the elderly.          Aspirin is not on active medication list.  Aspirin use is not indicated based on review of current medical condition/s. Risk of harm outweighs potential benefits.  .    Patient Active Problem List   Diagnosis   • Pure hypercholesterolemia   • Essential hypertension   • Temporary cerebral vascular dysfunction   • Esophageal reflux   • BMI 35.0-35.9,adult   • Diarrhea   • TIA (transient ischemic attack)   • Hypertensive urgency   • Chronic kidney disease, stage III (moderate) ([facility])   • Acute ST elevation myocardial infarction (STEMI) ([facility])   • ST elevation myocardial infarction (STEMI) ([facility])   • Coronary artery disease involving native coronary artery of native heart without angina pectoris   • Dyslipidemia   • Transient ischemic attack   • Left lower quadrant abdominal pain   • Acute kidney injury superimposed on chronic kidney disease (HCC)   • Acute renal failure (ARF) (HCC)   • Acute metabolic encephalopathy   • Chronic kidney disease, stage 4 (severe) ([facility])   • Longstanding persistent atrial fibrillation ([facility])   • Secondary hyperparathyroidism of renal origin ([facility])     Advance Care Planning  Advance Directive is not on file.  ACP discussion was held with the patient during this visit. Patient  "does not have an advance directive, information provided.    Review of Systems   All other systems reviewed and are negative.       Objective    Vitals:    08/03/22 1248   BP: 140/70   Pulse: 59   Temp: 97.1 °F (36.2 °C)   SpO2: 97%   Weight: 80.7 kg (178 lb)   Height: 157.5 cm (62.01\")   PainSc:   7     Estimated body mass index is 32.55 kg/m² as calculated from the following:    Height as of this encounter: 157.5 cm (62.01\").    Weight as of this encounter: 80.7 kg (178 lb).    BMI is >= 30 and <35. (Class 1 Obesity). The following options were offered after discussion;: nutrition counseling/recommendations      Does the patient have evidence of cognitive impairment? No    Physical Exam  Constitutional:       Appearance: She is well-developed. She is not ill-appearing.   HENT:      Head: Normocephalic.      Right Ear: Tympanic membrane, ear canal and external ear normal.      Left Ear: Tympanic membrane, ear canal and external ear normal.      Nose: Nose normal.      Mouth/Throat:      Mouth: Mucous membranes are moist.      Pharynx: Oropharynx is clear. Uvula midline.   Eyes:      Extraocular Movements: Extraocular movements intact.      Conjunctiva/sclera: Conjunctivae normal.      Pupils: Pupils are equal, round, and reactive to light.   Neck:      Thyroid: No thyromegaly.      Vascular: No carotid bruit.   Cardiovascular:      Rate and Rhythm: Normal rate and regular rhythm.      Pulses:           Radial pulses are 2+ on the right side and 2+ on the left side.        Dorsalis pedis pulses are 2+ on the right side and 2+ on the left side.      Heart sounds: Normal heart sounds.   Pulmonary:      Effort: Pulmonary effort is normal.      Breath sounds: Normal breath sounds.   Abdominal:      General: Bowel sounds are normal.      Palpations: Abdomen is soft.      Tenderness: There is no abdominal tenderness.   Musculoskeletal:         General: No tenderness or deformity. Normal range of motion.      Cervical " back: Full passive range of motion without pain, normal range of motion and neck supple.      Right lower leg: No edema.      Left lower leg: No edema.   Lymphadenopathy:      Cervical: No cervical adenopathy.   Skin:     General: Skin is warm.      Capillary Refill: Capillary refill takes less than 2 seconds.   Neurological:      Mental Status: She is alert and oriented to person, place, and time.      Sensory: No sensory deficit.      Coordination: Coordination normal.      Gait: Gait normal.      Comments: CN II-XII normal   Psychiatric:         Attention and Perception: Attention normal.         Mood and Affect: Mood and affect normal.         Speech: Speech normal.         Behavior: Behavior normal.         Thought Content: Thought content normal.       Lab Results   Component Value Date    CHLPL 211 (H) 08/03/2022    TRIG 87 08/03/2022    HDL 69 (H) 08/03/2022     (H) 08/03/2022    VLDL 15 08/03/2022    HGBA1C 5.80 (H) 08/03/2022            HEALTH RISK ASSESSMENT    Smoking Status:  Social History     Tobacco Use   Smoking Status Never Smoker   Smokeless Tobacco Never Used     Alcohol Consumption:  Social History     Substance and Sexual Activity   Alcohol Use No     Fall Risk Screen:    STEADI Fall Risk Assessment was completed, and patient is at MODERATE risk for falls. Assessment completed on:7/6/2022    Depression Screening:  PHQ-2/PHQ-9 Depression Screening 8/3/2022   Retired PHQ-9 Total Score -   Retired Total Score -   Little Interest or Pleasure in Doing Things 0-->not at all   Feeling Down, Depressed or Hopeless 0-->not at all   PHQ-9: Brief Depression Severity Measure Score 0       Health Habits and Functional and Cognitive Screening:  Functional & Cognitive Status 8/3/2022   Do you have difficulty preparing food and eating? No   Do you have difficulty bathing yourself, getting dressed or grooming yourself? No   Do you have difficulty using the toilet? No   Do you have difficulty moving around  from place to place? Yes   Do you have trouble with steps or getting out of a bed or a chair? Yes   Current Diet Well Balanced Diet   Dental Exam Up to date   Eye Exam Not up to date   Exercise (times per week) 3 times per week   Current Exercises Include Walking   Current Exercise Activities Include -   Do you need help using the phone?  No   Are you deaf or do you have serious difficulty hearing?  No   Do you need help with transportation? Yes   Do you need help shopping? Yes   Do you need help preparing meals?  No   Do you need help with housework?  No   Do you need help with laundry? No   Do you need help taking your medications? No   Do you need help managing money? No   Do you ever drive or ride in a car without wearing a seat belt? No   Have you felt unusual stress, anger or loneliness in the last month? No   Who do you live with? Child   If you need help, do you have trouble finding someone available to you? No   Have you been bothered in the last four weeks by sexual problems? No   Do you have difficulty concentrating, remembering or making decisions? Yes       Age-appropriate Screening Schedule:  Refer to the list below for future screening recommendations based on patient's age, sex and/or medical conditions. Orders for these recommended tests are listed in the plan section. The patient has been provided with a written plan.    Health Maintenance   Topic Date Due   • TDAP/TD VACCINES (1 - Tdap) Never done   • INFLUENZA VACCINE  10/01/2022   • LIPID PANEL  08/03/2023   • DXA SCAN  08/11/2024   • MAMMOGRAM  Discontinued   • ZOSTER VACCINE  Discontinued              Assessment & Plan   CMS Preventative Services Quick Reference  Risk Factors Identified During Encounter  Cardiovascular Disease  Chronic Pain   Dementia/Memory   Depression/Dysphoria  Fall Risk-High or Moderate  Hearing Problem  Immunizations Discussed/Encouraged (specific Immunizations; Tdap, Prevnar 20 (Pneumococcal 20-valent conjugate) and  COVID19  Inadequate Social Support, Isolation, Loneliness, Lack of Transportation, Financial Difficulties, or Caregiver Stress   Inactivity/Sedentary  Obesity/Overweight   Urinary Incontinence  The above risks/problems have been discussed with the patient.  Follow up actions/plans if indicated are seen below in the Assessment/Plan Section.  Pertinent information has been shared with the patient in the After Visit Summary.    Diagnoses and all orders for this visit:    1. Encounter for subsequent annual wellness visit (AWV) in Medicare patient (Primary)    2. Pure hypercholesterolemia  -     Lipid Panel        - Continue heart healthy, low-sodium diet    3. Essential hypertension        - Follow heart healthy diet.  Keep sodium intake < 1500 mg per day.  Avoid processed & fast foods.          - Exercise as tolerated, with a goal of 30 minutes of moderate exercise most days.         - Take medications as prescribed.    4. Coronary artery disease involving native coronary artery of native heart without angina pectoris        - Follow heart healthy diet.  Keep sodium intake < 1500 mg per day.  Avoid processed & fast foods.          - Exercise as tolerated, with a goal of 30 minutes of moderate exercise most days.         - Take medications as prescribed.    5. Class 1 obesity due to excess calories with serious comorbidity and body mass index (BMI) of 32.0 to 32.9 in adult        - BMI is >= 30 and <35. (Class 1 Obesity). The following options were offered after discussion;: exercise counseling/recommendations and nutrition counseling/recommendations    6. Longstanding persistent atrial fibrillation (HCC)       - Continue medications as prescribed    7. Secondary hyperparathyroidism of renal origin (HCC)       - Will continue to monitor labs     8. Chronic kidney disease, stage 4 (severe) (HCC)        - Low sodium, low protein diet         - Follow up with nephrology as scheduled     9. Transient ischemic attack         - Keep blood pressure, a fib, cholesterol well controlled.  Continue plavix, other medications as prescribed.    10. Chronic bilateral thoracic back pain  -     XR Spine Thoracic 3 View; Future    11. Fatigue, unspecified type  -     Vitamin B12    12. Hyperglycemia  -     Hemoglobin A1c  - Low carb diet in addition to restrictions above.    13. Encounter for osteoporosis screening in asymptomatic postmenopausal patient  -     dexa bone density axial; Future            Follow Up:   Return in about 6 months (around 2/3/2023) for Next scheduled follow up.     An After Visit Summary and PPPS were made available to the patient.

## 2022-08-04 LAB
CHOLEST SERPL-MCNC: 211 MG/DL (ref 0–200)
HBA1C MFR BLD: 5.8 % (ref 4.8–5.6)
HDLC SERPL-MCNC: 69 MG/DL (ref 40–60)
LDLC SERPL CALC-MCNC: 127 MG/DL (ref 0–100)
TRIGL SERPL-MCNC: 87 MG/DL (ref 0–150)
VIT B12 SERPL-MCNC: 323 PG/ML (ref 211–946)
VLDLC SERPL CALC-MCNC: 15 MG/DL (ref 5–40)

## 2022-08-08 ENCOUNTER — PATIENT MESSAGE (OUTPATIENT)
Dept: INTERNAL MEDICINE | Facility: CLINIC | Age: 87
End: 2022-08-08

## 2022-08-11 ENCOUNTER — APPOINTMENT (OUTPATIENT)
Dept: BONE DENSITY | Facility: HOSPITAL | Age: 87
End: 2022-08-11

## 2022-08-11 DIAGNOSIS — Z78.0 ENCOUNTER FOR OSTEOPOROSIS SCREENING IN ASYMPTOMATIC POSTMENOPAUSAL PATIENT: ICD-10-CM

## 2022-08-11 DIAGNOSIS — Z13.820 ENCOUNTER FOR OSTEOPOROSIS SCREENING IN ASYMPTOMATIC POSTMENOPAUSAL PATIENT: ICD-10-CM

## 2022-08-11 PROCEDURE — 77080 DXA BONE DENSITY AXIAL: CPT

## 2022-09-07 RX ORDER — CARVEDILOL 3.12 MG/1
3.12 TABLET ORAL 2 TIMES DAILY WITH MEALS
Qty: 60 TABLET | Refills: 3 | Status: SHIPPED | OUTPATIENT
Start: 2022-09-07 | End: 2022-12-30

## 2022-09-09 DIAGNOSIS — E78.00 PURE HYPERCHOLESTEROLEMIA: ICD-10-CM

## 2022-09-09 RX ORDER — PRAVASTATIN SODIUM 40 MG
TABLET ORAL
Qty: 90 TABLET | Refills: 3 | Status: SHIPPED | OUTPATIENT
Start: 2022-09-09 | End: 2022-09-20

## 2022-09-09 NOTE — TELEPHONE ENCOUNTER
Rx Refill Note  Requested Prescriptions     Pending Prescriptions Disp Refills   • pravastatin (PRAVACHOL) 40 MG tablet [Pharmacy Med Name: PRAVASTATIN 40MG TABLETS] 90 tablet 3     Sig: TAKE 1 TABLET BY MOUTH AT BEDTIME      Last office visit with prescribing clinician: 8/3/2022      Next office visit with prescribing clinician: Visit date not found            Preeti Duran LPN  09/09/22, 10:37 EDT

## 2022-09-19 DIAGNOSIS — E78.00 PURE HYPERCHOLESTEROLEMIA: ICD-10-CM

## 2022-09-20 RX ORDER — PRAVASTATIN SODIUM 40 MG
TABLET ORAL
Qty: 90 TABLET | Refills: 3 | Status: SHIPPED | OUTPATIENT
Start: 2022-09-20

## 2022-10-03 RX ORDER — CLOPIDOGREL BISULFATE 75 MG/1
75 TABLET ORAL DAILY
Qty: 30 TABLET | Refills: 11 | OUTPATIENT
Start: 2022-10-03

## 2022-10-03 RX ORDER — CLOPIDOGREL BISULFATE 75 MG/1
75 TABLET ORAL DAILY
Qty: 30 TABLET | Refills: 11 | Status: SHIPPED | OUTPATIENT
Start: 2022-10-03

## 2022-10-03 NOTE — TELEPHONE ENCOUNTER
Caller: Slade Kuhn    Relationship: Emergency Contact    Best call back number: [phone]    Requested Prescriptions:   Requested Prescriptions     Pending Prescriptions Disp Refills   • clopidogrel (PLAVIX) 75 MG tablet 30 tablet 11     Sig: Take 1 tablet by mouth Daily.        Ph[facility] where [id number] [address] [facility] - [phone]  - [phone] FX     Additional details provided by patient: PT IS DOWN TO 2 DAYS OF MEDICATION.     Does the patient have less than a 3 day supply:  [x] Yes  [] No    Dax Bran Rep   10/03/22 12:12 EDT

## 2022-10-31 ENCOUNTER — TELEPHONE (OUTPATIENT)
Dept: CARDIOLOGY | Facility: CLINIC | Age: 87
End: 2022-10-31

## 2022-10-31 NOTE — TELEPHONE ENCOUNTER
Caller: Slade Kuhn    Relationship: Emergency Contact    Best call back number: [phone]    What is the best time to reach you: ANYTIME     Who are you requesting to speak with (clinical staff, provider,  specific staff member): ANYONE     What was the call regarding: PATIENTS SON WANTING TO KNOW IF  HAS CONTACTED THE OFFICE REGARDING A MEDICATION THE PATIENT HAS BEEN PRESCRIBED.  CONTACT  WITH  OPHTHALMOLOGY. [phone] EXT. [phone]    Do you require a callback: YES

## 2022-11-04 ENCOUNTER — OFFICE VISIT (OUTPATIENT)
Dept: INTERNAL MEDICINE | Facility: CLINIC | Age: 87
End: 2022-11-04

## 2022-11-04 VITALS
OXYGEN SATURATION: 97 % | SYSTOLIC BLOOD PRESSURE: 158 MMHG | BODY MASS INDEX: 31.83 KG/M2 | TEMPERATURE: 97.2 F | HEIGHT: 62 IN | DIASTOLIC BLOOD PRESSURE: 64 MMHG | WEIGHT: 173 LBS | HEART RATE: 60 BPM

## 2022-11-04 DIAGNOSIS — R42 DIZZY SPELLS: Primary | ICD-10-CM

## 2022-11-04 DIAGNOSIS — I48.11 LONGSTANDING PERSISTENT ATRIAL FIBRILLATION: ICD-10-CM

## 2022-11-04 DIAGNOSIS — H61.23 EXCESSIVE CERUMEN IN BOTH EAR CANALS: ICD-10-CM

## 2022-11-04 DIAGNOSIS — I10 ESSENTIAL HYPERTENSION: ICD-10-CM

## 2022-11-04 DIAGNOSIS — G45.9 TRANSIENT ISCHEMIC ATTACK: ICD-10-CM

## 2022-11-04 DIAGNOSIS — E78.00 PURE HYPERCHOLESTEROLEMIA: ICD-10-CM

## 2022-11-04 DIAGNOSIS — M79.89 SOFT TISSUE MASS: ICD-10-CM

## 2022-11-04 PROCEDURE — 93005 ELECTROCARDIOGRAM TRACING: CPT | Performed by: NURSE PRACTITIONER

## 2022-11-04 PROCEDURE — 99214 OFFICE O/P EST MOD 30 MIN: CPT | Performed by: NURSE PRACTITIONER

## 2022-11-04 NOTE — PROGRESS NOTES
"     Office Visit      Patient Name: Tanya Elise  : 1933   MRN: [id number]     Chief Complaint:    Chief Complaint   Patient presents with   • Dizziness     \"Woozy\" per pt       History of Present Illness: Tanya Elise is a 89 y.o. female who is here today with reports of dizziness, feeling woozy over the past 2 months.  She sat down unexpectedly, hurt her shoulder in .  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  She has noted a lump on her left forearm, not certain how long it has been present.    HTN, hyperlipidemia, H/OTIA,, A fib:takes Amlodipine, carvedilol, clopidogrel, Eliquis, pravastatin as prescribed. Monitors BP at home, WNL.  Eat a heart healthy diet.  Denies chest pain, dyspnea, orthopnea, palpitations, lower extremity edema, confusion, headaches, visual disturbances.    Subjective      I have reviewed and the following portions of the patient's history were updated as appropriate: past family history, past medical history, past social history, past surgical history and problem list.      Current Outpatient Medications:   •  amLODIPine (NORVASC) 5 MG tablet, Take 1 tablet by mouth Every Night., Disp: 90 tablet, Rfl: 3  •  baclofen (LIORESAL) 10 MG tablet, Take 0.5 tablets by mouth 2 (Two) Times a Day., Disp: 180 tablet, Rfl: 1  •  carbamide peroxide (Debrox) 6.5 % otic solution, Administer 5 drops into both ears As Needed for Ear Pain., Disp: 15 mL, Rfl: 0  •  carvedilol (COREG) 3.125 MG tablet, Take 1 tablet by mouth 2 (Two) Times a Day With Meals., Disp: 60 tablet, Rfl: 3  •  clopidogrel (PLAVIX) 75 MG tablet, TAKE 1 TABLET BY MOUTH DAILY, Disp: 30 tablet, Rfl: 11  •  Eliquis 2.5 MG tablet tablet, TAKE 1 TABLET BY MOUTH EVERY 12 HOURS FOR ATRIAL FIBRILLATION, Disp: 60 tablet, Rfl: 11  •  ferrous sulfate 325 (65 FE) MG tablet, Take 1 tablet by mouth 3 (Three) Times a Week., Disp: , Rfl:   •  fexofenadine (ALLEGRA) 60 MG tablet, Take 60 mg by mouth " "Daily As Needed., Disp: , Rfl:   •  nystatin-triamcinolone (MYCOLOG) 104036-8.1 UNIT/GM-% ointment, Apply 1 application topically to the appropriate area as directed 2 (Two) Times a Day As Needed (rash, itching)., Disp: 60 g, Rfl: 1  •  ondansetron (ZOFRAN) 4 MG tablet, Take 4 mg by mouth Every 8 (Eight) Hours As Needed for Nausea or Vomiting., Disp: , Rfl:   •  pravastatin (PRAVACHOL) 40 MG tablet, TAKE 1 TABLET BY MOUTH AT BEDTIME, Disp: 90 tablet, Rfl: 3  •  Vitamin D, Cholecalciferol, (CHOLECALCIFEROL) 10 MCG (400 UNIT) tablet, Take 400 Units by mouth Daily., Disp: , Rfl:     Allergies   Allergen Reactions   • Miralax [Polyethylene Glycol] Other (See Comments)     'gaggy' feeling   • Crestor [Rosuvastatin Calcium] Headache   • Lipitor [Atorvastatin Calcium] Myalgia   • Penicillins Rash       Objective     Physical Exam:  Vital Signs:   Vitals:    11/04/22 1423   BP: 158/64   Pulse: 60   Temp: 97.2 °F (36.2 °C)   SpO2: 97%   Weight: 78.5 kg (173 lb)   Height: 157.5 cm (62.01\")     Body mass index is 31.63 kg/m².    Physical Exam  Constitutional:       Appearance: She is not ill-appearing.   HENT:      Head: Normocephalic.      Right Ear: Tympanic membrane and external ear normal.      Left Ear: Tympanic membrane and external ear normal.      Ears:      Comments: Bilateral dark cerumen obscuring TM's.  Negative Renata Hallpike. Bilateral ear canals slightly pink, clear after irrigation.   Eyes:      Extraocular Movements: Extraocular movements intact.      Conjunctiva/sclera: Conjunctivae normal.      Pupils: Pupils are equal, round, and reactive to light.   Cardiovascular:      Rate and Rhythm: Normal rate and regular rhythm.      Pulses:           Radial pulses are 2+ on the right side and 2+ on the left side.        Dorsalis pedis pulses are 2+ on the right side and 2+ on the left side.      Heart sounds: Normal heart sounds.   Pulmonary:      Effort: Pulmonary effort is normal.      Breath sounds: Normal breath " sounds.   Musculoskeletal:      Cervical back: Normal range of motion and neck supple.      Right lower leg: No edema.      Left lower leg: No edema.      Comments: Soft, nontender mass on left forearm   Skin:     General: Skin is warm.      Capillary Refill: Capillary refill takes less than 2 seconds.   Neurological:      Mental Status: She is alert and oriented to person, place, and time.      Coordination: Coordination normal.      Gait: Gait normal.      Comments: CN II-XII normal   Psychiatric:         Mood and Affect: Mood normal.         Behavior: Behavior normal.         Thought Content: Thought content normal.           ECG 12 Lead    Date/Time: 11/4/2022 3:16 PM  Performed by: Aileen Grande APRN  Authorized by: Aileen Grande APRN   Comparison: compared with previous ECG from 11/12/2020  Similar to previous ECG  Rhythm: sinus bradycardia  Rate: bradycardic  BPM: 58  Conduction: 1st degree AV block    Clinical impression: abnormal EKG      Ear Cerumen Removal    Date/Time: 11/18/2022 11:40 AM  Performed by: Aileen Grande APRN  Authorized by: Aileen Grande APRN   Consent: Verbal consent obtained. Written consent not obtained.  Risks and benefits: risks, benefits and alternatives were discussed  Consent given by: patient  Patient understanding: patient states understanding of the procedure being performed  Patient identity confirmed: verbally with patient    Anesthesia:  Local Anesthetic: none  Location details: left ear and right ear  Patient tolerance: patient tolerated the procedure well with no immediate complications  Procedure type: irrigation   Sedation:  Patient sedated: no            Assessment / Plan      Assessment/Plan:   Diagnoses and all orders for this visit:    1. Dizzy spells (Primary)  -     ECG 12 Lead  - Patient to contact clinic if dizziness persists after ear irrigation    2. Excessive cerumen in both ear canals  -     carbamide peroxide (Debrox) 6.5 %  otic solution; Administer 5 drops into both ears As Needed for Ear Pain.  Dispense: 15 mL; Refill: 0        -     Ear Cerumen Removal    3. Essential hypertension  4. Pure hypercholesterolemia  5. Longstanding persistent atrial fibrillation (HCC)  6. Transient ischemic attack        - Follow heart healthy diet.  Keep sodium intake < 1500 mg per day.  Avoid processed & fast foods.          - Exercise as tolerated, with a goal of 30 minutes of moderate exercise most days.         - Take medications as prescribed.    7. Soft tissue mass  -     US soft tissue; Future    Follow Up:   Return for Next scheduled follow up.    Patient was given instructions and counseling regarding her condition or for health maintenance advice. Please see specific information pulled into the AVS if appropriate.       [facility] [address] [facility]     Please note that portions of this note may have been completed with a voice recognition program. Efforts were made to edit dictation, but occasionally words are mistranscribed.

## 2022-11-18 PROCEDURE — 69209 REMOVE IMPACTED EAR WAX UNI: CPT | Performed by: NURSE PRACTITIONER

## 2022-12-12 ENCOUNTER — OFFICE VISIT (OUTPATIENT)
Dept: CARDIOLOGY | Facility: CLINIC | Age: 87
End: 2022-12-12

## 2022-12-12 VITALS — OXYGEN SATURATION: 99 % | HEIGHT: 62 IN | WEIGHT: 174 LBS | BODY MASS INDEX: 32.02 KG/M2 | HEART RATE: 56 BPM

## 2022-12-12 DIAGNOSIS — I48.11 LONGSTANDING PERSISTENT ATRIAL FIBRILLATION: ICD-10-CM

## 2022-12-12 DIAGNOSIS — G45.9 TRANSIENT ISCHEMIC ATTACK: ICD-10-CM

## 2022-12-12 DIAGNOSIS — E78.5 DYSLIPIDEMIA: ICD-10-CM

## 2022-12-12 DIAGNOSIS — I10 ESSENTIAL HYPERTENSION: ICD-10-CM

## 2022-12-12 DIAGNOSIS — I25.10 CORONARY ARTERY DISEASE INVOLVING NATIVE CORONARY ARTERY OF NATIVE HEART WITHOUT ANGINA PECTORIS: Primary | ICD-10-CM

## 2022-12-12 PROCEDURE — 99213 OFFICE O/P EST LOW 20 MIN: CPT | Performed by: INTERNAL MEDICINE

## 2022-12-12 NOTE — PROGRESS NOTES
Subjective:     Encounter Date:12/12/2022      Patient ID: Tanya Elise is a 89 y.o. female.    Chief Complaint: Coronary artery disease  HPI  This is an 89-year-old female patient who presents to cardiology clinic for routine follow-up.  The patient indicates she is done well from a cardiovascular perspective without recurrent symptoms, cardiovascular issues or hospitalizations.  She reports compliance with her medications with no perceived side effects.  She has had no bleeding complications related to anticoagulation therapy with Eliquis 2.5 mg twice daily.  She has had no signs or symptoms to suggest stroke, TIA or other cardioembolic events.  She remains a non-smoker.  The following portions of the patient's history were reviewed and updated as appropriate: allergies, current medications, past family history, past medical history, past social history, past surgical history and problem  Review of Systems   Constitutional: Negative for chills, diaphoresis, fever, malaise/fatigue, weight gain and weight loss.   HENT: Negative for ear discharge, hearing loss, hoarse voice and nosebleeds.    Eyes: Negative for discharge, double vision, pain and photophobia.   Cardiovascular: Negative for chest pain, claudication, cyanosis, dyspnea on exertion, irregular heartbeat, leg swelling, near-syncope, orthopnea, palpitations, paroxysmal nocturnal dyspnea and syncope.   Respiratory: Negative for cough, hemoptysis, shortness of breath, sputum production and wheezing.    Endocrine: Negative for cold intolerance, heat intolerance, polydipsia, polyphagia and polyuria.   Hematologic/Lymphatic: Negative for adenopathy and bleeding problem. Does not bruise/bleed easily.   Skin: Negative for color change, flushing, itching and rash.   Musculoskeletal: Negative for muscle cramps, muscle weakness, myalgias and stiffness.   Gastrointestinal: Negative for abdominal pain, diarrhea, hematemesis, hematochezia, nausea and vomiting.    Genitourinary: Negative for dysuria, frequency and nocturia.   Neurological: Negative for focal weakness, loss of balance, numbness, paresthesias and seizures.   Psychiatric/Behavioral: Negative for altered mental status, hallucinations and suicidal ideas.   Allergic/Immunologic: Negative for HIV exposure, hives and persistent infections.           Current Outpatient Medications:   •  amLODIPine (NORVASC) 5 MG tablet, Take 1 tablet by mouth Every Night., Disp: 90 tablet, Rfl: 3  •  baclofen (LIORESAL) 10 MG tablet, Take 0.5 tablets by mouth 2 (Two) Times a Day., Disp: 180 tablet, Rfl: 1  •  carbamide peroxide (Debrox) 6.5 % otic solution, Administer 5 drops into both ears As Needed for Ear Pain., Disp: 15 mL, Rfl: 0  •  carvedilol (COREG) 3.125 MG tablet, Take 1 tablet by mouth 2 (Two) Times a Day With Meals., Disp: 60 tablet, Rfl: 3  •  clopidogrel (PLAVIX) 75 MG tablet, TAKE 1 TABLET BY MOUTH DAILY, Disp: 30 tablet, Rfl: 11  •  Eliquis 2.5 MG tablet tablet, TAKE 1 TABLET BY MOUTH EVERY 12 HOURS FOR ATRIAL FIBRILLATION, Disp: 60 tablet, Rfl: 11  •  ferrous sulfate 325 (65 FE) MG tablet, Take 1 tablet by mouth 3 (Three) Times a Week., Disp: , Rfl:   •  fexofenadine (ALLEGRA) 60 MG tablet, Take 60 mg by mouth Daily As Needed., Disp: , Rfl:   •  nystatin-triamcinolone (MYCOLOG) 372596-4.1 UNIT/GM-% ointment, Apply 1 application topically to the appropriate area as directed 2 (Two) Times a Day As Needed (rash, itching)., Disp: 60 g, Rfl: 1  •  ondansetron (ZOFRAN) 4 MG tablet, Take 4 mg by mouth Every 8 (Eight) Hours As Needed for Nausea or Vomiting., Disp: , Rfl:   •  pravastatin (PRAVACHOL) 40 MG tablet, TAKE 1 TABLET BY MOUTH AT BEDTIME, Disp: 90 tablet, Rfl: 3  •  Vitamin D, Cholecalciferol, (CHOLECALCIFEROL) 10 MCG (400 UNIT) tablet, Take 400 Units by mouth Daily., Disp: , Rfl:     Objective:   Vitals and nursing note reviewed.   Constitutional:       Appearance: Healthy appearance. Not in distress.   Neck:       "Vascular: No JVR. JVD normal.   Pulmonary:      Effort: Pulmonary effort is normal.      Breath sounds: Normal breath sounds. No wheezing. No rhonchi. No rales.   Chest:      Chest wall: Not tender to palpatation.   Cardiovascular:      PMI at left midclavicular line. Normal rate. Regular rhythm. Normal S1. Normal S2.      Murmurs: There is no murmur.      No gallop. No click. No rub.   Pulses:     Intact distal pulses.   Edema:     Peripheral edema absent.   Abdominal:      General: Bowel sounds are normal.      Palpations: Abdomen is soft.      Tenderness: There is no abdominal tenderness.   Musculoskeletal: Normal range of motion.         General: No tenderness. Skin:     General: Skin is warm and dry.   Neurological:      General: No focal deficit present.      Mental Status: Alert and oriented to person, place and time.       BP: 130/55, Pulse 56, height 157.5 cm (62\"), weight 78.9 kg (174 lb), SpO2 99 %, not currently breastfeeding.   Lab Review:     Assessment:       1. Coronary artery disease involving native coronary artery of native heart without angina pectoris  Stable and angina free.    2. Dyslipidemia  Tolerating high intensity statin based cholesterol-lowering therapy.    3. Essential hypertension  Acceptable blood pressure control.    4.  Paroxysmal atrial fibrillation (HCC)  Rate control and anticoagulation strategy.  Asymptomatic.  Palpation of the radial pulse and cardiac auscultation demonstrates a regular rhythm at today's visit.  I suspect the patient is \"back in\" sinus rhythm.    5. Transient ischemic attack  No signs or symptoms to suggest recurrent stroke or TIA.    Procedures    Plan:     Advance Care Planning   ACP discussion was held with the patient during this visit. Patient has an advance directive (not in EMR), copy requested.     No changes in medications have been made at today's visit.    No cardiovascular testing is warranted at this time.    Fall precautions have been reinforced " with the patient.

## 2022-12-22 ENCOUNTER — APPOINTMENT (OUTPATIENT)
Dept: ULTRASOUND IMAGING | Facility: HOSPITAL | Age: 87
End: 2022-12-22

## 2022-12-30 RX ORDER — CARVEDILOL 3.12 MG/1
TABLET ORAL
Qty: 60 TABLET | Refills: 11 | Status: SHIPPED | OUTPATIENT
Start: 2022-12-30

## 2023-01-03 DIAGNOSIS — E78.00 PURE HYPERCHOLESTEROLEMIA: ICD-10-CM

## 2023-01-03 RX ORDER — AMLODIPINE BESYLATE 5 MG/1
5 TABLET ORAL NIGHTLY
Qty: 90 TABLET | Refills: 3 | Status: SHIPPED | OUTPATIENT
Start: 2023-01-03

## 2023-03-03 DIAGNOSIS — I48.91 ATRIAL FIBRILLATION, UNSPECIFIED TYPE: ICD-10-CM

## 2023-03-03 DIAGNOSIS — G45.9 TIA (TRANSIENT ISCHEMIC ATTACK): ICD-10-CM

## 2023-03-06 RX ORDER — APIXABAN 2.5 MG/1
TABLET, FILM COATED ORAL
Qty: 60 TABLET | Refills: 4 | Status: SHIPPED | OUTPATIENT
Start: 2023-03-06

## 2023-03-19 DIAGNOSIS — M62.830 BACK MUSCLE SPASM: ICD-10-CM

## 2023-03-20 NOTE — TELEPHONE ENCOUNTER
Rx Refill Note       Last office visit with prescribing clinician: 11/4/2022   Last telemedicine visit with prescribing clinician: Visit date not found   Next office visit with prescribing clinician: Visit date not found

## 2023-03-21 RX ORDER — BACLOFEN 10 MG/1
TABLET ORAL
Qty: 180 TABLET | Refills: 1 | Status: SHIPPED | OUTPATIENT
Start: 2023-03-21

## 2023-04-05 ENCOUNTER — APPOINTMENT (OUTPATIENT)
Dept: GENERAL RADIOLOGY | Facility: HOSPITAL | Age: 88
End: 2023-04-05
Payer: MEDICARE

## 2023-04-05 ENCOUNTER — HOSPITAL ENCOUNTER (EMERGENCY)
Facility: HOSPITAL | Age: 88
Discharge: HOME | End: 2023-04-05
Attending: EMERGENCY MEDICINE | Admitting: EMERGENCY MEDICINE
Payer: MEDICARE

## 2023-04-05 VITALS
RESPIRATION RATE: 16 BRPM | TEMPERATURE: 98.1 F | OXYGEN SATURATION: 98 % | HEIGHT: 62 IN | DIASTOLIC BLOOD PRESSURE: 69 MMHG | HEART RATE: 56 BPM | WEIGHT: 174 LBS | BODY MASS INDEX: 32.02 KG/M2 | SYSTOLIC BLOOD PRESSURE: 178 MMHG

## 2023-04-05 DIAGNOSIS — T14.8XXA HEMATOMA: Primary | ICD-10-CM

## 2023-04-05 PROCEDURE — 73562 X-RAY EXAM OF KNEE 3: CPT

## 2023-04-05 PROCEDURE — 99282 EMERGENCY DEPT VISIT SF MDM: CPT

## 2023-04-05 PROCEDURE — 73590 X-RAY EXAM OF LOWER LEG: CPT

## 2023-04-05 RX ORDER — CEPHALEXIN 500 MG/1
500 CAPSULE ORAL 3 TIMES DAILY
Qty: 21 CAPSULE | Refills: 0 | Status: SHIPPED | OUTPATIENT
Start: 2023-04-05 | End: 2023-04-12

## 2023-04-05 NOTE — ED PROVIDER NOTES
"Subjective  History of Present Illness:    Chief Complaint:   Chief Complaint   Patient presents with   • Leg Pain     Pt states she fell last Thursday, pain with bruising but able to walk. Pt is on a blood thinner.       History of Present Illness: Tanya Elise is a 89 y.o. female who presents to the emergency department complaining of right leg ecchymosis and swelling.  She tripped and fell a week ago sustaining an injury to the right lateral lower leg and right shin.  She is on Eliquis for history of CVA.  She states she has had some pain warmth and swelling to the affected area.  She is able to bear weight.  Went to urgent treatment and they told her \"it should not be used without much.  You need to go to the ER\" patient denies chest pain or shortness of breath.  No calf pain or tenderness.    Onset: 1 week ago  Duration: Ongoing  Exacerbating / Alleviating factors: Worse with palpation  Associated symptoms: None, specifically no chest pain or shortness of breath or calf tenderness      Nurses Notes reviewed and agree, including vitals, allergies, social history and prior medical history.     Review of Systems   Constitutional: Negative.    HENT: Negative.    Eyes: Negative.    Respiratory: Negative.  Negative for shortness of breath.    Cardiovascular: Negative.  Negative for chest pain.   Gastrointestinal: Negative.    Genitourinary: Negative.    Musculoskeletal: Negative.    Skin: Negative.         Swelling and ecchymosis to the right lower extremity   Neurological: Negative.    Psychiatric/Behavioral: Negative.        Past Medical History:   Diagnosis Date   • Acid reflux    • Allergic    • Cataract    • Cerumen impaction    • Cyst of kidney, acquired    • Dizziness    • High cholesterol    • Hypertension    • Impaired functional mobility, balance, gait, and endurance    • Influenza    • Reflux gastritis    • Seasonal allergies    • Spinal headache    • Stroke    • Transient ischemic attack    • Wears " "glasses        Allergies:    Miralax [polyethylene glycol], Crestor [rosuvastatin calcium], Lipitor [atorvastatin calcium], and Penicillins      Past Surgical History:   Procedure Laterality Date   • APPENDECTOMY     • BREAST BIOPSY Left    • CARDIAC CATHETERIZATION N/A 10/13/2020    Procedure: Left Heart Cath;  Surgeon: Travis Franco MD;  Location: [facility] CATH INVASIVE LOCATION;  Service: Cardiovascular;  Laterality: N/A;   • CATARACT EXTRACTION Bilateral    •  SECTION      X2   • COLONOSCOPY     • DILATATION AND CURETTAGE     • EYE CAPSULOTOMY WITH LASER Right 3/20/2017    Procedure: RIGHT EYE CAPSULOTOMY WITH LASER;  Surgeon: Ev Cortez MD;  Location: [facility] OR;  Service:    • HYSTERECTOMY     • NASAL SEPTUM SURGERY     • TOOTH EXTRACTION           Social History     Socioeconomic History   • Marital status:    Tobacco Use   • Smoking status: Never   • Smokeless tobacco: Never   Vaping Use   • Vaping Use: Never used   Substance and Sexual Activity   • Alcohol use: No   • Drug use: No   • Sexual activity: Defer         Family History   Problem Relation Age of Onset   • Diabetes Son        Objective  Physical Exam:  /69 (BP Location: Left arm, Patient Position: Sitting)   Pulse 56   Temp 98.1 °F (36.7 °C) (Oral)   Resp 16   Ht 157.5 cm (62\")   Wt 78.9 kg (174 lb)   SpO2 98%   BMI 31.83 kg/m²      Physical Exam  Vitals and nursing note reviewed.   Constitutional:       General: She is not in acute distress.     Appearance: Normal appearance. She is not ill-appearing or toxic-appearing.   HENT:      Head: Normocephalic and atraumatic.      Nose: Nose normal.   Eyes:      Extraocular Movements: Extraocular movements intact.   Cardiovascular:      Rate and Rhythm: Normal rate and regular rhythm.      Pulses: Normal pulses.   Pulmonary:      Effort: Pulmonary effort is normal.   Abdominal:      General: Abdomen is flat.   Musculoskeletal:         General: Normal range of motion.       "  Legs:       Comments: 2+ DP pulse on the right   Skin:     General: Skin is warm and dry.   Neurological:      General: No focal deficit present.      Mental Status: She is alert and oriented to person, place, and time. Mental status is at baseline.      Gait: Gait normal.   Psychiatric:         Mood and Affect: Mood normal.         Behavior: Behavior normal.           Procedures    ED Course:         Lab Results (last 24 hours)     ** No results found for the last 24 hours. **           XR Knee 3 View Right    Result Date: 4/5/2023  RIGHT KNEE SERIES  HISTORY: Right knee pain, fall.  FINDINGS:  3 views show no evidence of an acute, displaced fracture or dislocation of the visualized bony architecture. The joint spaces appear normal. There is diffuse soft tissue swelling. No effusion identified. There are vascular calcifications      Impression: No acute fracture.   PROCEDURE: XR TIBIA FIBULA 2 VW RIGHT-  HISTORY: Right lower extremity pain and swelling, fall  COMPARISON: None.  FINDINGS:  A 2 view exam demonstrates no acute fracture or dislocation. The joint spaces are preserved.  There is diffuse soft tissue swelling.  IMPRESSION: No acute fracture.         Images were reviewed, interpreted, and dictated by Dr. Reyna Quintana MD Transcribed by Sophie Garcia PA-C.  This report was signed and finalized on 4/5/2023 12:27 PM by Reyna Quintana MD.    XR Tibia Fibula 2 View Right    Result Date: 4/5/2023  RIGHT KNEE SERIES  HISTORY: Right knee pain, fall.  FINDINGS:  3 views show no evidence of an acute, displaced fracture or dislocation of the visualized bony architecture. The joint spaces appear normal. There is diffuse soft tissue swelling. No effusion identified. There are vascular calcifications      Impression: No acute fracture.   PROCEDURE: XR TIBIA FIBULA 2 VW RIGHT-  HISTORY: Right lower extremity pain and swelling, fall  COMPARISON: None.  FINDINGS:  A 2 view exam demonstrates no acute fracture or dislocation.  The joint spaces are preserved.  There is diffuse soft tissue swelling.  IMPRESSION: No acute fracture.         Images were reviewed, interpreted, and dictated by Dr. Reyna Quintana MD Transcribed by Sophie Garcia PA-C.  This report was signed and finalized on 4/5/2023 12:27 PM by Reyna Quintana MD.         DAVID Elise is a 89 y.o. female who presents to the emergency department for evaluation of ecchymosis and swelling to the right lower extremity after an injury    Differential diagnosis includes fracture, contusion, hematoma among other etiologies.    Plain films of the right knee and right tib-fib ordered for further evaluation of the patient's presentation.    Chart review if available included outside testing, previous visits, prior labs, prior imaging, available notes from prior evaluations or visits with specialists, medication list, allergies, past medical history, past surgical history when applicable.    Patient was treated with N/A    Findings most consistent with ecchymosis and hematoma on the patient who is on Eliquis.  She has no DVT stigmata, no chest pain or shortness of breath.  She is able to bear weight without difficulty.  There is no sign of cellulitis or abscess at this time.  There is some mild warmth over a large hematoma which would be expected at this time.  Did encourage her to watch for signs of infection and return for worsening symptoms however this time recommend symptomatic treatment, Ace wrap and outpatient follow-up.  We will give a prescription for Keflex to begin only if signs of infection occur which we discussed with the patient    Plan for disposition is discharged home.  Patient/family comfortable with and understanding of the plan.      Final diagnoses:   Hematoma        Dylan Milner PA-C  04/05/23 1237

## 2023-04-19 ENCOUNTER — OFFICE VISIT (OUTPATIENT)
Dept: INTERNAL MEDICINE | Facility: CLINIC | Age: 88
End: 2023-04-19
Payer: MEDICARE

## 2023-04-19 ENCOUNTER — HOSPITAL ENCOUNTER (OUTPATIENT)
Dept: ULTRASOUND IMAGING | Facility: HOSPITAL | Age: 88
Discharge: HOME | End: 2023-04-19
Admitting: NURSE PRACTITIONER
Payer: MEDICARE

## 2023-04-19 VITALS
HEART RATE: 57 BPM | OXYGEN SATURATION: 98 % | BODY MASS INDEX: 32.94 KG/M2 | DIASTOLIC BLOOD PRESSURE: 62 MMHG | WEIGHT: 179 LBS | SYSTOLIC BLOOD PRESSURE: 116 MMHG | HEIGHT: 62 IN | TEMPERATURE: 97.9 F

## 2023-04-19 DIAGNOSIS — M79.89 PAIN AND SWELLING OF LOWER LEG, RIGHT: Primary | ICD-10-CM

## 2023-04-19 DIAGNOSIS — I10 ESSENTIAL HYPERTENSION: ICD-10-CM

## 2023-04-19 DIAGNOSIS — I48.11 LONGSTANDING PERSISTENT ATRIAL FIBRILLATION: ICD-10-CM

## 2023-04-19 DIAGNOSIS — G45.9 TRANSIENT ISCHEMIC ATTACK: ICD-10-CM

## 2023-04-19 DIAGNOSIS — M79.661 PAIN AND SWELLING OF LOWER LEG, RIGHT: Primary | ICD-10-CM

## 2023-04-19 DIAGNOSIS — N18.4 CHRONIC KIDNEY DISEASE, STAGE 4 (SEVERE): ICD-10-CM

## 2023-04-19 DIAGNOSIS — I25.10 CORONARY ARTERY DISEASE INVOLVING NATIVE CORONARY ARTERY OF NATIVE HEART WITHOUT ANGINA PECTORIS: ICD-10-CM

## 2023-04-19 PROCEDURE — 93971 EXTREMITY STUDY: CPT

## 2023-04-19 RX ORDER — FUROSEMIDE 40 MG/1
40 TABLET ORAL DAILY PRN
Qty: 30 TABLET | Refills: 1 | Status: SHIPPED | OUTPATIENT
Start: 2023-04-19

## 2023-04-19 NOTE — PROGRESS NOTES
Office Visit      Patient Name: Tanya Elise  : 1933   MRN: [id number]     Chief Complaint:    Chief Complaint   Patient presents with   • Fall     X 2 since last visit, R leg swelling       History of Present Illness: Tanya Elise is a 89 y.o. female who is here today for scheduled follow up of HTN.      Incidentally endorses falling 2 weeks ago. Presented to [facility] ED 6 days later on 2023 with right leg ecchymosis and swelling.right tib-fib x-ray showed no fracture, did indicate hematoma.  Swelling was improving until she slipped while stepping into bathtub 10 days ago.  Swelling of right lower leg has been increased and lower leg is warm to the touch.  She takes both Plavix and Eliquis daily, prescribed by cardiology for A-fib. No fever, chills, numbness, tingling, burning pain.  States both incidents were prompted by poor footwear or trip hazards.  Gait has not been affected.   HTN, A-fib, CAD, history of TIA: Takes amlodipine, carvedilol, clopidogrel, Eliquis, pravastatin, furosemide as prescribed. Denies chest pain, dyspnea, orthopnea, palpitations, lower extremity edema, confusion, headaches, weakness, visual disturbances.    Subjective      I have reviewed and the following portions of the patient's history were updated as appropriate: past family history, past medical history, past social history, past surgical history and problem list.      Current Outpatient Medications:   •  amLODIPine (NORVASC) 5 MG tablet, TAKE 1 TABLET BY MOUTH EVERY NIGHT, Disp: 90 tablet, Rfl: 3  •  baclofen (LIORESAL) 10 MG tablet, TAKE 1/2 TABLET BY MOUTH TWICE DAILY, Disp: 180 tablet, Rfl: 1  •  carbamide peroxide (Debrox) 6.5 % otic solution, Administer 5 drops into both ears As Needed for Ear Pain., Disp: 15 mL, Rfl: 0  •  carvedilol (COREG) 3.125 MG tablet, TAKE 1 TABLET BY MOUTH TWICE DAILY WITH MEALS, Disp: 60 tablet, Rfl: 11  •  clopidogrel (PLAVIX) 75 MG tablet, TAKE 1 TABLET BY MOUTH DAILY, Disp:  "30 tablet, Rfl: 11  •  Eliquis 2.5 MG tablet tablet, TAKE 1 TABLET BY MOUTH EVERY 12 HOURS FOR ATRIAL FIBRILLATION, Disp: 60 tablet, Rfl: 4  •  ferrous sulfate 325 (65 FE) MG tablet, Take 1 tablet by mouth 3 (Three) Times a Week., Disp: , Rfl:   •  fexofenadine (ALLEGRA) 60 MG tablet, Take 1 tablet by mouth Daily As Needed., Disp: , Rfl:   •  fluticasone (FLONASE) 50 MCG/ACT nasal spray, 2 sprays into the nostril(s) as directed by provider Daily., Disp: , Rfl:   •  ondansetron (ZOFRAN) 4 MG tablet, Take 1 tablet by mouth Every 8 (Eight) Hours As Needed for Nausea or Vomiting., Disp: , Rfl:   •  pravastatin (PRAVACHOL) 40 MG tablet, TAKE 1 TABLET BY MOUTH AT BEDTIME, Disp: 90 tablet, Rfl: 3  •  Vitamin D, Cholecalciferol, (CHOLECALCIFEROL) 10 MCG (400 UNIT) tablet, Take 1 tablet by mouth Daily., Disp: , Rfl:   •  furosemide (Lasix) 40 MG tablet, Take 1 tablet by mouth Daily As Needed (swelling)., Disp: 30 tablet, Rfl: 1    Allergies   Allergen Reactions   • Miralax [Polyethylene Glycol] Other (See Comments)     'gaggy' feeling   • Crestor [Rosuvastatin Calcium] Headache   • Lipitor [Atorvastatin Calcium] Myalgia   • Penicillins Rash       Objective     Physical Exam:  Vital Signs:   Vitals:    04/19/23 1526   BP: 116/62   Pulse: 57   Temp: 97.9 °F (36.6 °C)   SpO2: 98%   Weight: 81.2 kg (179 lb)   Height: 157.5 cm (62.01\")     Body mass index is 32.73 kg/m².    Physical Exam  Constitutional:       Appearance: She is not ill-appearing.   HENT:      Head: Normocephalic.      Right Ear: External ear normal.      Left Ear: External ear normal.   Eyes:      Extraocular Movements: Extraocular movements intact.      Conjunctiva/sclera: Conjunctivae normal.      Pupils: Pupils are equal, round, and reactive to light.   Cardiovascular:      Rate and Rhythm: Normal rate and regular rhythm.      Pulses:           Dorsalis pedis pulses are 1+ on the right side and 1+ on the left side.        Posterior tibial pulses are 1+ on the " right side and 1+ on the left side.      Heart sounds: Normal heart sounds.   Pulmonary:      Effort: Pulmonary effort is normal.      Breath sounds: Normal breath sounds.   Musculoskeletal:      Cervical back: Normal range of motion and neck supple.      Right lower le+ Edema present.      Left lower le+ Edema present.   Skin:     General: Skin is warm.      Capillary Refill: Capillary refill takes less than 2 seconds.      Comments: Significant ecchymosis, swelling of left lower leg. Tender and warm to touch.    Neurological:      Mental Status: She is alert and oriented to person, place, and time.      Cranial Nerves: Cranial nerves 2-12 are intact.      Coordination: Coordination normal.      Gait: Gait normal.   Psychiatric:         Mood and Affect: Mood normal.         Behavior: Behavior normal.         Thought Content: Thought content normal.             Assessment / Plan      Assessment/Plan:   Diagnoses and all orders for this visit:    1. Pain and swelling of lower leg, right (Primary)  -     US venous doppler lower extremity right (duplex)    2. Essential hypertension  3. Longstanding persistent atrial fibrillation  4. Transient ischemic attack  5. Coronary artery disease involving native coronary artery of native heart without angina pectoris        - Follow heart healthy diet.  Keep sodium intake < 1500 mg per day.  Avoid processed & fast foods.          - Exercise as tolerated, with a goal of 30 minutes of moderate exercise most days.         - Take medications as prescribed.    6. Chronic kidney disease, stage 4 (severe)  - Abstain from NSAIDs  - Keep blood pressure well controlled       Follow Up:   Return in about 2 weeks (around 5/3/2023) for Next scheduled follow up.    Patient was given instructions and counseling regarding her condition or for health maintenance advice. Please see specific information pulled into the AVS if appropriate.       [facility] [address] [facility]     Please  note that portions of this note may have been completed with a voice recognition program. Efforts were made to edit dictation, but occasionally words are mistranscribed.

## 2023-04-20 ENCOUNTER — PATIENT MESSAGE (OUTPATIENT)
Dept: INTERNAL MEDICINE | Facility: CLINIC | Age: 88
End: 2023-04-20
Payer: MEDICARE

## 2023-04-20 RX ORDER — DOXYCYCLINE HYCLATE 100 MG/1
100 CAPSULE ORAL 2 TIMES DAILY
Qty: 14 CAPSULE | Refills: 0 | Status: SHIPPED | OUTPATIENT
Start: 2023-04-20 | End: 2023-04-27

## 2023-04-26 ENCOUNTER — TELEPHONE (OUTPATIENT)
Dept: INTERNAL MEDICINE | Facility: CLINIC | Age: 88
End: 2023-04-26
Payer: MEDICARE

## 2023-04-26 NOTE — TELEPHONE ENCOUNTER
Caller: Tanya Elise    Relationship: Self    Best call back number: [phone]    Who are you requesting to speak with (clinical staff, provider,  specific staff member):  CLINICAL     What was the call regarding: PATIENT SAID MONTY WANTED TO FOLLOW UP WITH HER   SHE WILL FINISH HER ANTIBIOTIC TODAY  SHE STILL HAS A HEMATOMA ON HER RIGHT LEG   IT IS STILL SLIGHTLY SWOLLEN   NOT AS MUCH AS IT WAS BEFORE     Do you require a callback:  PLEASE CALL IF SHE NEEDS TO BE SEEN SOONER   NO APPOINTMENTS AVAILABLE UNTIL 5-17-23

## 2023-06-15 RX ORDER — FUROSEMIDE 40 MG/1
TABLET ORAL
Qty: 30 TABLET | Refills: 1 | Status: SHIPPED | OUTPATIENT
Start: 2023-06-15

## 2023-06-15 NOTE — TELEPHONE ENCOUNTER
Rx Refill Note  Requested Prescriptions     Pending Prescriptions Disp Refills    furosemide (LASIX) 40 MG tablet [Pharmacy Med Name: FUROSEMIDE 40MG TABLETS] 30 tablet 1     Sig: TAKE 1 TABLET BY MOUTH DAILY AS NEEDED FOR SWELLING      Last office visit with prescribing clinician: 4/19/2023   Last telemedicine visit with prescribing clinician: Visit date not found   Next office visit with prescribing clinician: Visit date not found                         Would you like a call back once the refill request has been completed: [] Yes [] No    If the office needs to give you a call back, can they leave a voicemail: [] Yes [] No    Reddy Carrillo MA  06/15/23, 07:29 EDT

## 2023-07-23 DIAGNOSIS — I48.91 ATRIAL FIBRILLATION, UNSPECIFIED TYPE: ICD-10-CM

## 2023-07-24 RX ORDER — APIXABAN 2.5 MG/1
TABLET, FILM COATED ORAL
Qty: 60 TABLET | Refills: 4 | Status: SHIPPED | OUTPATIENT
Start: 2023-07-24

## 2023-09-21 RX ORDER — CLOPIDOGREL BISULFATE 75 MG/1
75 TABLET ORAL DAILY
Qty: 30 TABLET | Refills: 2 | Status: SHIPPED | OUTPATIENT
Start: 2023-09-21

## 2023-09-21 NOTE — TELEPHONE ENCOUNTER
LAST OFFICE VISIT:12/12/22    NEXT OFFICE VISIT:12/12/23     0 = swallows foods/liquids without difficulty

## 2023-11-20 DIAGNOSIS — E78.00 PURE HYPERCHOLESTEROLEMIA: ICD-10-CM

## 2023-11-20 RX ORDER — PRAVASTATIN SODIUM 40 MG
TABLET ORAL
Qty: 90 TABLET | Refills: 3 | Status: SHIPPED | OUTPATIENT
Start: 2023-11-20

## 2023-12-01 NOTE — PROGRESS NOTES
[facility] Cardiology Office Follow Up Note    Tanya Elise  [id number]  2023    Primary Care Provider: Aileen Grande APRN   Referring Provider: No ref. provider found    Chief Complaint: Follow-up of coronary artery disease  History of Present Illness:   Mrs. Tanya Elise is a 89 y.o. female who presents to the Cardiology Clinic for follow up of coronary artery disease.  She has a past medical history of coronary artery disease, hypertension, hyperlipidemia, persistent atrial fibrillation, chronic kidney disease, and TIA.  The patient reports feeling well from a cardiac standpoint today.  She specifically denies chest pain and dyspnea.  She denies palpitations, dizziness, syncope.  She denies orthopnea, PND, and reports intermittent lower extremity edema.  She offers no other complaints or concerns at this time.      Past Cardiac Testin. Last Coronary Angio: 10/13/2020  High-lateral ST elevation myocardial infarction with thrombotic occlusion of second diagonal branch of left anterior descending  Successful catheter-based revascularization of the infarct-related artery utilizing drug-eluting stent placement and balloon angioplasty  Intermediate grade stenoses involving the mid left anterior descending and distal LCx  2. Prior Stress Testing: None  3. Last Echo: 2020  Estimated left ventricular EF = 65% Left ventricular ejection fraction appears to be 61 - 65%. Left ventricular systolic function is normal.  Left ventricular diastolic function is consistent with age.  4. Prior Holter Monitor: 2020   A relatively benign monitor study.  No sustained supraventricular ventricular tachycardia.  1 asymptomatic 9 beat idalmis of NSVT    Review of Systems:   Review of Systems  As Noted in HPI.   I have reviewed and confirmed the accuracy of the ROS as documented by the MA/LPN/RN ESTEPHANIE Issa    I have reviewed and/or updated the patient's past  medical, past surgical, family, social history, problem list and allergies as appropriate.     Medications:     Current Outpatient Medications:     acetaminophen (TYLENOL) 500 MG tablet, Take 1 tablet by mouth., Disp: , Rfl:     amLODIPine (NORVASC) 5 MG tablet, TAKE 1 TABLET BY MOUTH EVERY NIGHT, Disp: 90 tablet, Rfl: 3    baclofen (LIORESAL) 10 MG tablet, TAKE 1/2 TABLET BY MOUTH TWICE DAILY, Disp: 180 tablet, Rfl: 1    carbamide peroxide (Debrox) 6.5 % otic solution, Administer 5 drops into both ears As Needed for Ear Pain., Disp: 15 mL, Rfl: 0    carvedilol (COREG) 3.125 MG tablet, TAKE 1 TABLET BY MOUTH TWICE DAILY WITH MEALS, Disp: 60 tablet, Rfl: 11    clopidogrel (PLAVIX) 75 MG tablet, TAKE 1 TABLET BY MOUTH DAILY, Disp: 30 tablet, Rfl: 2    Eliquis 2.5 MG tablet tablet, TAKE 1 TABLET BY MOUTH EVERY 12 HOURS FOR ATRIAL FIBRILLATION, Disp: 60 tablet, Rfl: 4    ferrous sulfate 325 (65 FE) MG tablet, Take 1 tablet by mouth 3 (Three) Times a Week., Disp: , Rfl:     fexofenadine (ALLEGRA) 60 MG tablet, Take 1 tablet by mouth Daily As Needed., Disp: , Rfl:     fluticasone (FLONASE) 50 MCG/ACT nasal spray, 2 sprays into the nostril(s) as directed by provider Daily., Disp: , Rfl:     furosemide (LASIX) 40 MG tablet, TAKE 1 TABLET BY MOUTH DAILY AS NEEDED FOR SWELLING, Disp: 30 tablet, Rfl: 1    multivitamin (MULTIVITAMIN PO), Take  by mouth Daily., Disp: , Rfl:     multivitamin with minerals (PRESERVISION AREDS PO), Take 1 tablet by mouth Daily., Disp: , Rfl:     ondansetron (ZOFRAN) 4 MG tablet, Take 1 tablet by mouth Every 8 (Eight) Hours As Needed for Nausea or Vomiting., Disp: , Rfl:     pravastatin (PRAVACHOL) 40 MG tablet, TAKE 1 TABLET BY MOUTH AT BEDTIME, Disp: 90 tablet, Rfl: 3    Vitamin D, Cholecalciferol, (CHOLECALCIFEROL) 10 MCG (400 UNIT) tablet, Take 1 tablet by mouth Daily., Disp: , Rfl:     Physical Exam:  Vital Signs:   Vitals:    12/12/23 1433   BP: 128/70   BP Location: Left arm   Patient  "Position: Sitting   Cuff Size: Adult   Pulse: 55   SpO2: 95%   Weight: 82.1 kg (181 lb)   Height: 157.5 cm (62\")     Body mass index is 33.11 kg/m².    Physical Exam  Vitals and nursing note reviewed.   Constitutional:       General: She is not in acute distress.     Appearance: She is obese.   HENT:      Head: Normocephalic and atraumatic.   Neck:      Trachea: Trachea normal.   Cardiovascular:      Rate and Rhythm: Normal rate and regular rhythm.      Pulses: Normal pulses.      Heart sounds: Normal heart sounds. No murmur heard.     No friction rub. No gallop.   Pulmonary:      Effort: Pulmonary effort is normal.      Breath sounds: Normal breath sounds.   Musculoskeletal:      Cervical back: Neck supple.      Right lower leg: No edema.      Left lower leg: No edema.   Skin:     General: Skin is warm and dry.   Neurological:      Mental Status: She is alert and oriented to person, place, and time.   Psychiatric:         Mood and Affect: Mood normal.         Behavior: Behavior normal. Behavior is cooperative.         Thought Content: Thought content does not include suicidal ideation.         Results Review:   I reviewed the patient's new clinical results.    Procedures    Assessment / Plan:   Diagnoses and all orders for this visit:    1. Coronary artery disease involving native coronary artery of native heart without angina pectoris (Primary)  Stable and angina free    2. Paroxysmal atrial fibrillation  No symptoms to suggest recurrence  Continues to tolerate low-dose Eliquis    3. Essential hypertension  Acceptable blood pressure today    4. Dyslipidemia  Continues to tolerate low-intensity statin therapy      Preventative Cardiology:   Tobacco Cessation: N/A   Advance Care Planning: ACP discussion was declined by the patient. Patient does not have an advance directive, declines further assistance.     Follow Up:   Return in about 1 year (around 12/12/2024) for Follow-up with Dr. Franco.      Thank you for " allowing me to participate in the care of your patient. Please do not hesitate to contact me with additional questions or concerns.     ESTEPHANIE Dinh

## 2023-12-12 ENCOUNTER — OFFICE VISIT (OUTPATIENT)
Dept: CARDIOLOGY | Facility: CLINIC | Age: 88
End: 2023-12-12
Payer: MEDICARE

## 2023-12-12 VITALS
WEIGHT: 181 LBS | BODY MASS INDEX: 33.31 KG/M2 | OXYGEN SATURATION: 95 % | SYSTOLIC BLOOD PRESSURE: 128 MMHG | HEIGHT: 62 IN | DIASTOLIC BLOOD PRESSURE: 70 MMHG | HEART RATE: 55 BPM

## 2023-12-12 DIAGNOSIS — I10 ESSENTIAL HYPERTENSION: ICD-10-CM

## 2023-12-12 DIAGNOSIS — I25.10 CORONARY ARTERY DISEASE INVOLVING NATIVE CORONARY ARTERY OF NATIVE HEART WITHOUT ANGINA PECTORIS: Primary | ICD-10-CM

## 2023-12-12 DIAGNOSIS — I48.0 PAROXYSMAL ATRIAL FIBRILLATION: ICD-10-CM

## 2023-12-12 DIAGNOSIS — E78.5 DYSLIPIDEMIA: ICD-10-CM

## 2023-12-12 PROBLEM — I16.0 HYPERTENSIVE URGENCY: Status: RESOLVED | Noted: 2020-09-15 | Resolved: 2023-12-12

## 2023-12-12 PROCEDURE — 1160F RVW MEDS BY RX/DR IN RCRD: CPT | Performed by: NURSE PRACTITIONER

## 2023-12-12 PROCEDURE — 99214 OFFICE O/P EST MOD 30 MIN: CPT | Performed by: NURSE PRACTITIONER

## 2023-12-12 PROCEDURE — 1159F MED LIST DOCD IN RCRD: CPT | Performed by: NURSE PRACTITIONER

## 2023-12-12 RX ORDER — DIPHENOXYLATE HYDROCHLORIDE AND ATROPINE SULFATE 2.5; .025 MG/1; MG/1
TABLET ORAL DAILY
COMMUNITY

## 2023-12-12 RX ORDER — MULTIPLE VITAMINS W/ MINERALS TAB 9MG-400MCG
1 TAB ORAL DAILY
COMMUNITY

## 2023-12-12 RX ORDER — ACETAMINOPHEN 500 MG
500 TABLET ORAL
COMMUNITY

## 2023-12-17 DIAGNOSIS — I48.91 ATRIAL FIBRILLATION, UNSPECIFIED TYPE: ICD-10-CM

## 2023-12-18 RX ORDER — CLOPIDOGREL BISULFATE 75 MG/1
75 TABLET ORAL DAILY
Qty: 30 TABLET | Refills: 11 | Status: SHIPPED | OUTPATIENT
Start: 2023-12-18

## 2023-12-18 RX ORDER — APIXABAN 2.5 MG/1
TABLET, FILM COATED ORAL
Qty: 60 TABLET | Refills: 4 | Status: SHIPPED | OUTPATIENT
Start: 2023-12-18

## 2023-12-18 RX ORDER — CARVEDILOL 3.12 MG/1
TABLET ORAL
Qty: 60 TABLET | Refills: 11 | Status: SHIPPED | OUTPATIENT
Start: 2023-12-18

## 2023-12-24 DIAGNOSIS — E78.00 PURE HYPERCHOLESTEROLEMIA: ICD-10-CM

## 2023-12-28 RX ORDER — AMLODIPINE BESYLATE 5 MG/1
5 TABLET ORAL NIGHTLY
Qty: 90 TABLET | Refills: 3 | Status: SHIPPED | OUTPATIENT
Start: 2023-12-28

## 2024-01-22 DIAGNOSIS — M62.830 BACK MUSCLE SPASM: ICD-10-CM

## 2024-01-22 RX ORDER — BACLOFEN 10 MG/1
5 TABLET ORAL DAILY
Qty: 180 TABLET | Refills: 1 | Status: SHIPPED | OUTPATIENT
Start: 2024-01-22

## 2024-01-22 NOTE — TELEPHONE ENCOUNTER
Caller: Tanya Elise    Relationship: Self    Best call back number: [phone]     Requested Prescriptions:   Requested Prescriptions     Pending Prescriptions Disp Refills    baclofen (LIORESAL) 10 MG tablet 180 tablet 1     Sig: Take 0.5 tablets by mouth Daily.        Pharmacy where request should be sent: [id number] [address] [facility] [address] - [phone]  - [phone] FX     Last office visit with prescribing clinician: 4/19/2023   Last telemedicine visit with prescribing clinician: Visit date not found   Next office visit with prescribing clinician: Visit date not found     Additional details provided by patient: PATIENT IS OUT OF MEDICATION. PATIENT STATED THAT THE PRESCRIPTION SHOULD STATE TAKE 1 PILL A DAY AND NOT A HALF OF PILL.     Does the patient have less than a 3 day supply:  [x] Yes  [] No    Would you like a call back once the refill request has been completed: [x] Yes [] No    If the office needs to give you a call back, can they leave a voicemail: [x] Yes [] No    Dax Solano Rep   01/22/24 09:22 EST

## 2024-02-19 ENCOUNTER — PATIENT MESSAGE (OUTPATIENT)
Dept: INTERNAL MEDICINE | Facility: CLINIC | Age: 89
End: 2024-02-19
Payer: MEDICARE

## 2024-02-19 DIAGNOSIS — M62.830 BACK MUSCLE SPASM: ICD-10-CM

## 2024-02-19 RX ORDER — BACLOFEN 10 MG/1
10 TABLET ORAL 2 TIMES DAILY
Qty: 180 TABLET | Refills: 1 | Status: SHIPPED | OUTPATIENT
Start: 2024-02-19

## 2024-02-19 NOTE — TELEPHONE ENCOUNTER
From: aTnya Elise  To: Aileen Grande  Sent: 2/19/2024 9:35 AM EST  Subject: Need new prescription for Baclofen    Hi there!    I contacted the office some time ago (3-4 weeks ago?) to let Dr. Gallagher know that I have increased my dosage of Baclofen 10 mg from 1/2 a tablet twice a day to a full tablet twice a day. I did this because I was having to take 6 Tylenols per day for the back pain.    The problem is that since my prescription was not changed to this new dose I have run out and can not refill it because it is considered early refill. I need to have a new prescription written for this changed dose as I am experiencing increased back pain and was trying to not take so much Tylenol because of my kidneys.    Please let me know if that change can be made. My pharmacy is still the [facility] [phone]. Thank you.

## 2024-03-02 DIAGNOSIS — M62.830 BACK MUSCLE SPASM: ICD-10-CM

## 2024-03-02 RX ORDER — BACLOFEN 10 MG/1
5 TABLET ORAL 2 TIMES DAILY
Qty: 180 TABLET | Refills: 1 | OUTPATIENT
Start: 2024-03-02

## 2024-03-22 ENCOUNTER — OFFICE VISIT (OUTPATIENT)
Dept: INTERNAL MEDICINE | Facility: CLINIC | Age: 89
End: 2024-03-22
Payer: MEDICARE

## 2024-03-22 VITALS
DIASTOLIC BLOOD PRESSURE: 68 MMHG | HEIGHT: 62 IN | TEMPERATURE: 98 F | OXYGEN SATURATION: 99 % | WEIGHT: 180 LBS | BODY MASS INDEX: 33.13 KG/M2 | SYSTOLIC BLOOD PRESSURE: 134 MMHG | HEART RATE: 58 BPM

## 2024-03-22 DIAGNOSIS — R73.9 HYPERGLYCEMIA: ICD-10-CM

## 2024-03-22 DIAGNOSIS — M62.830 BACK MUSCLE SPASM: ICD-10-CM

## 2024-03-22 DIAGNOSIS — E78.5 DYSLIPIDEMIA: ICD-10-CM

## 2024-03-22 DIAGNOSIS — M54.50 LUMBAR BACK PAIN: Primary | ICD-10-CM

## 2024-03-22 DIAGNOSIS — I10 ESSENTIAL HYPERTENSION: ICD-10-CM

## 2024-03-22 DIAGNOSIS — I48.0 PAROXYSMAL ATRIAL FIBRILLATION: ICD-10-CM

## 2024-03-22 DIAGNOSIS — I25.10 CORONARY ARTERY DISEASE INVOLVING NATIVE CORONARY ARTERY OF NATIVE HEART WITHOUT ANGINA PECTORIS: ICD-10-CM

## 2024-03-22 DIAGNOSIS — R35.1 NOCTURIA: ICD-10-CM

## 2024-03-22 DIAGNOSIS — N18.4 CHRONIC KIDNEY DISEASE, STAGE 4 (SEVERE): ICD-10-CM

## 2024-03-22 LAB
EXPIRATION DATE: NORMAL
HBA1C MFR BLD: 5.5 % (ref 4.5–5.7)
Lab: NORMAL

## 2024-03-22 RX ORDER — BACLOFEN 20 MG/1
20 TABLET ORAL 3 TIMES DAILY PRN
Qty: 90 TABLET | Refills: 1 | Status: SHIPPED | OUTPATIENT
Start: 2024-03-22

## 2024-03-22 RX ORDER — LIDOCAINE 50 MG/G
1 PATCH TOPICAL EVERY 24 HOURS
Qty: 30 EACH | Refills: 1 | Status: SHIPPED | OUTPATIENT
Start: 2024-03-22

## 2024-03-22 NOTE — PROGRESS NOTES
Office Visit      Patient Name: Tanya Elise  : 1933   MRN: [id number]     Chief Complaint:    Chief Complaint   Patient presents with    Back Pain       History of Present Illness: Tanya Elise is a 89 y.o. female who is here today with back pain.  Has taken baclofen for muscle spasms in her back, not working as well as it once did. Sleeps like she's in a hammock, on her back.  Unable to sleep in any other position.  Pain does not radiate into her legs or upper back.      Urge to urinate wakes her from sleep 3-4 x a night.  Glucose has been elevated in the past.  Hypertension, CAD, A-fib, CKD: Taking amlodipine, carvedilol, clopidogrel, Eliquis, furosemide, pravastatin as prescribed.  Monitors blood pressure at home, within normal limits.  Eats a heart healthy, low-salt diet.  Unable to be physically active often, due to decreased stamina and back pain. Denies chest pain, dyspnea, orthopnea, palpitations, lower extremity edema, confusion, headaches, weakness, visual disturbances.    Subjective      I have reviewed and the following portions of the patient's history were updated as appropriate: past family history, past medical history, past social history, past surgical history and problem list.      Current Outpatient Medications:     acetaminophen (TYLENOL) 500 MG tablet, Take 1 tablet by mouth., Disp: , Rfl:     amLODIPine (NORVASC) 5 MG tablet, TAKE 1 TABLET BY MOUTH EVERY NIGHT, Disp: 90 tablet, Rfl: 3    carbamide peroxide (Debrox) 6.5 % otic solution, Administer 5 drops into both ears As Needed for Ear Pain., Disp: 15 mL, Rfl: 0    carvedilol (COREG) 3.125 MG tablet, TAKE 1 TABLET BY MOUTH TWICE DAILY WITH MEALS, Disp: 60 tablet, Rfl: 11    clopidogrel (PLAVIX) 75 MG tablet, TAKE 1 TABLET BY MOUTH DAILY, Disp: 30 tablet, Rfl: 11    Eliquis 2.5 MG tablet tablet, TAKE 1 TABLET BY MOUTH EVERY 12 HOURS FOR ATRIAL FIBRILLATION, Disp: 60 tablet, Rfl: 4    ferrous sulfate 325 (65 FE) MG  "tablet, Take 1 tablet by mouth 3 (Three) Times a Week., Disp: , Rfl:     fexofenadine (ALLEGRA) 60 MG tablet, Take 1 tablet by mouth Daily As Needed., Disp: , Rfl:     fluticasone (FLONASE) 50 MCG/ACT nasal spray, 2 sprays into the nostril(s) as directed by provider Daily., Disp: , Rfl:     furosemide (LASIX) 40 MG tablet, TAKE 1 TABLET BY MOUTH DAILY AS NEEDED FOR SWELLING, Disp: 30 tablet, Rfl: 1    multivitamin (MULTIVITAMIN PO), Take  by mouth Daily., Disp: , Rfl:     multivitamin with minerals (PRESERVISION AREDS PO), Take 1 tablet by mouth Daily., Disp: , Rfl:     ondansetron (ZOFRAN) 4 MG tablet, Take 1 tablet by mouth Every 8 (Eight) Hours As Needed for Nausea or Vomiting., Disp: , Rfl:     pravastatin (PRAVACHOL) 40 MG tablet, TAKE 1 TABLET BY MOUTH AT BEDTIME, Disp: 90 tablet, Rfl: 3    Vitamin D, Cholecalciferol, (CHOLECALCIFEROL) 10 MCG (400 UNIT) tablet, Take 1 tablet by mouth Daily., Disp: , Rfl:     baclofen (LIORESAL) 20 MG tablet, Take 1 tablet by mouth 3 (Three) Times a Day As Needed for Muscle Spasms., Disp: 90 tablet, Rfl: 1    lidocaine (LIDODERM) 5 %, Place 1 patch on the skin as directed by provider Daily. Remove & Discard patch within 12 hours or as directed by MD, Disp: 30 each, Rfl: 1    Allergies   Allergen Reactions    Miralax [Polyethylene Glycol] Other (See Comments)     'gaggy' feeling    Crestor [Rosuvastatin Calcium] Headache    Lipitor [Atorvastatin Calcium] Myalgia    Penicillins Rash       Objective     Physical Exam:  Vital Signs:   Vitals:    03/22/24 1132   BP: 134/68   Pulse: 58   Temp: 98 °F (36.7 °C)   SpO2: 99%   Weight: 81.6 kg (180 lb)   Height: 157.5 cm (62.01\")     Body mass index is 32.91 kg/m².         Physical Exam  Constitutional:       Appearance: She is obese. She is not ill-appearing.   HENT:      Head: Normocephalic.      Right Ear: External ear normal.      Left Ear: External ear normal.   Eyes:      Conjunctiva/sclera: Conjunctivae normal.      Pupils: Pupils " are equal, round, and reactive to light.   Cardiovascular:      Rate and Rhythm: Normal rate and regular rhythm.      Pulses:           Radial pulses are 2+ on the right side and 2+ on the left side.        Dorsalis pedis pulses are 2+ on the right side and 2+ on the left side.      Heart sounds: Normal heart sounds.   Pulmonary:      Effort: Pulmonary effort is normal.      Breath sounds: Normal breath sounds.   Musculoskeletal:      Cervical back: Normal range of motion and neck supple.      Right lower leg: No edema.      Left lower leg: No edema.   Skin:     General: Skin is warm.      Capillary Refill: Capillary refill takes less than 2 seconds.   Neurological:      Mental Status: She is alert and oriented to person, place, and time.      Coordination: Coordination normal.      Gait: Gait normal.   Psychiatric:         Mood and Affect: Mood normal.         Behavior: Behavior normal.         Thought Content: Thought content normal.         Lab Results   Component Value Date    HGBA1C 5.5 03/22/2024    HGBA1C 5.80 (H) 08/03/2022    HGBA1C 5.70 (H) 06/23/2021       Assessment / Plan      Assessment/Plan:   Diagnoses and all orders for this visit:    1. Lumbar back pain (Primary)  -     lidocaine (LIDODERM) 5 %; Place 1 patch on the skin as directed by provider Daily. Remove & Discard patch within 12 hours or as directed by MD  Dispense: 30 each; Refill: 1  -     Ambulatory Referral to Physical Therapy Evaluate and treat    2. Back muscle spasm  -     lidocaine (LIDODERM) 5 %; Place 1 patch on the skin as directed by provider Daily. Remove & Discard patch within 12 hours or as directed by MD  Dispense: 30 each; Refill: 1  -     baclofen (LIORESAL) 20 MG tablet; Take 1 tablet by mouth 3 (Three) Times a Day As Needed for Muscle Spasms.  Dispense: 90 tablet; Refill: 1  -     Ambulatory Referral to Physical Therapy Evaluate and treat    3. Hyperglycemia  -     POC Glycosylated Hemoglobin (Hb A1C)    4. Coronary artery  disease involving native coronary artery of native heart without angina pectoris  5. Dyslipidemia  6. Essential hypertension  7. Paroxysmal atrial fibrillation        - Follow heart healthy diet.  Keep sodium intake < 1500 mg per day.  Avoid processed & fast foods.          - Exercise as tolerated, with a goal of 30 minutes of moderate exercise most days.         - Take medications as prescribed.    8. Chronic kidney disease, stage 4 (severe)        - Avoid NSAIDs    9. Nocturia        - Discussed pelvic floor PT, medications.  Politely declined both of these options.             Follow Up:   Return in about 6 months (around 9/22/2024) for Medicare Wellness.    Patient was given instructions and counseling regarding her condition or for health maintenance advice. Please see specific information pulled into the AVS if appropriate.       [facility] [address] [facility]     Please note that portions of this note may have been completed with a voice recognition program. Efforts were made to edit dictation, but occasionally words are mistranscribed.

## 2024-05-12 DIAGNOSIS — I48.91 ATRIAL FIBRILLATION, UNSPECIFIED TYPE: ICD-10-CM

## 2024-06-03 RX ORDER — APIXABAN 2.5 MG/1
TABLET, FILM COATED ORAL
Qty: 180 TABLET | Refills: 3 | Status: SHIPPED | OUTPATIENT
Start: 2024-06-03

## 2024-06-17 DIAGNOSIS — M62.830 BACK MUSCLE SPASM: ICD-10-CM

## 2024-06-21 ENCOUNTER — TELEPHONE (OUTPATIENT)
Dept: CARDIOLOGY | Facility: CLINIC | Age: 89
End: 2024-06-21
Payer: MEDICARE

## 2024-06-21 NOTE — TELEPHONE ENCOUNTER
REQUEST FOR CARDIAC CLEARANCE    Caller name: Tanya Langen     Phone Number: [phone]    Surgeon's name: [facility]    Type of planned surgery: DENTAL WORK DONE    Date of planned surgery: NOT SCHEDULED YET    Type of anesthesia: NOT SURE    Have you been experiencing chest pain or shortness of breath? NO    Is your doctor requesting for you to stop any of your medications prior to your surgery? ELIQUIS    Where should we fax the clearance to?   [phone]-phone

## 2024-07-23 ENCOUNTER — OFFICE VISIT (OUTPATIENT)
Dept: INTERNAL MEDICINE | Facility: CLINIC | Age: 89
End: 2024-07-23
Payer: MEDICARE

## 2024-07-23 VITALS
HEART RATE: 58 BPM | HEIGHT: 62 IN | WEIGHT: 186 LBS | TEMPERATURE: 97.5 F | BODY MASS INDEX: 34.23 KG/M2 | RESPIRATION RATE: 18 BRPM | SYSTOLIC BLOOD PRESSURE: 138 MMHG | OXYGEN SATURATION: 97 % | DIASTOLIC BLOOD PRESSURE: 83 MMHG

## 2024-07-23 DIAGNOSIS — M62.830 BACK MUSCLE SPASM: ICD-10-CM

## 2024-07-23 DIAGNOSIS — L81.9 CHANGE IN PIGMENTED LESION OF FACE: Primary | ICD-10-CM

## 2024-07-23 DIAGNOSIS — N18.4 CHRONIC KIDNEY DISEASE, STAGE 4 (SEVERE): ICD-10-CM

## 2024-07-23 DIAGNOSIS — I25.10 CORONARY ARTERY DISEASE INVOLVING NATIVE CORONARY ARTERY OF NATIVE HEART WITHOUT ANGINA PECTORIS: ICD-10-CM

## 2024-07-23 DIAGNOSIS — I10 ESSENTIAL HYPERTENSION: ICD-10-CM

## 2024-07-23 PROCEDURE — 1125F AMNT PAIN NOTED PAIN PRSNT: CPT | Performed by: NURSE PRACTITIONER

## 2024-07-23 PROCEDURE — 99214 OFFICE O/P EST MOD 30 MIN: CPT | Performed by: NURSE PRACTITIONER

## 2024-07-23 PROCEDURE — G2211 COMPLEX E/M VISIT ADD ON: HCPCS | Performed by: NURSE PRACTITIONER

## 2024-07-23 PROCEDURE — 1159F MED LIST DOCD IN RCRD: CPT | Performed by: NURSE PRACTITIONER

## 2024-07-23 PROCEDURE — 1160F RVW MEDS BY RX/DR IN RCRD: CPT | Performed by: NURSE PRACTITIONER

## 2024-07-23 RX ORDER — BACLOFEN 20 MG/1
20 TABLET ORAL 3 TIMES DAILY PRN
Qty: 90 TABLET | Refills: 1 | OUTPATIENT
Start: 2024-07-23

## 2024-07-23 RX ORDER — BACLOFEN 20 MG/1
20 TABLET ORAL 3 TIMES DAILY PRN
Qty: 90 TABLET | Refills: 1 | Status: SHIPPED | OUTPATIENT
Start: 2024-07-23

## 2024-07-23 NOTE — PROGRESS NOTES
Office Visit      Patient Name: Tanya Elise  : 1933   MRN: [id number]     Chief Complaint:    Chief Complaint   Patient presents with    Back Pain     Baclofen refill is requested    Suspicious Skin Lesion     Above right eye for the last month or so.  Thinks may have clipped when cutting eye brows       History of Present Illness: Tanya Elise is a 89 y.o. female who is here today with request for referral to dermatology. She has a raised lesion above her right brow that has been present for at least a month.  She believes it may have been present for as long as a year.  Becomes problematic when clipping her brows, as it gets in the way. It hurts a little bit, she thinks she may have clipped it recently.  No itching, scaling or change in color.    She has back spasms.  Has been taking baclofen 10 mg as prescribed.  Not working very well, and has taken 2 at a time. Works better at higher dose without adverse effects.    HTN, hyperlipidemia.  History of CVA, TIA.  Follows cardiology.  Taking amlodipine, apixaban, carvedilol, clopidogrel, furosemide, pravastatin as prescribed.  Does not monitor blood pressure at home often, typically within expected range when checked.  Trying to eat a healthy diet.  Denies chest pain, dyspnea, orthopnea, palpitations, lower extremity edema, confusion, headaches, weakness, visual disturbances.  CKD.  Follows nephrology.  Avoids NSAIDs, stays well-hydrated throughout the day.  Accompanied by her son today.  Subjective      I have reviewed and the following portions of the patient's history were updated as appropriate: past family history, past medical history, past social history, past surgical history and problem list.      Current Outpatient Medications:     acetaminophen (TYLENOL) 500 MG tablet, Take 1 tablet by mouth., Disp: , Rfl:     amLODIPine (NORVASC) 5 MG tablet, TAKE 1 TABLET BY MOUTH EVERY NIGHT, Disp: 90 tablet, Rfl: 3    apixaban (Eliquis) 2.5 MG  tablet tablet, TAKE 1 TABLET BY MOUTH EVERY 12 HOURS FOR ATRIAL FIBRILLATION, Disp: 180 tablet, Rfl: 3    baclofen (LIORESAL) 20 MG tablet, Take 1 tablet by mouth 3 (Three) Times a Day As Needed for Muscle Spasms., Disp: 90 tablet, Rfl: 1    carvedilol (COREG) 3.125 MG tablet, TAKE 1 TABLET BY MOUTH TWICE DAILY WITH MEALS, Disp: 60 tablet, Rfl: 11    clopidogrel (PLAVIX) 75 MG tablet, TAKE 1 TABLET BY MOUTH DAILY, Disp: 30 tablet, Rfl: 11    ferrous sulfate 325 (65 FE) MG tablet, Take 1 tablet by mouth 3 (Three) Times a Week., Disp: , Rfl:     fexofenadine (ALLEGRA) 60 MG tablet, Take 1 tablet by mouth Daily As Needed., Disp: , Rfl:     furosemide (LASIX) 40 MG tablet, TAKE 1 TABLET BY MOUTH DAILY AS NEEDED FOR SWELLING, Disp: 30 tablet, Rfl: 1    lidocaine (LIDODERM) 5 %, Place 1 patch on the skin as directed by provider Daily. Remove & Discard patch within 12 hours or as directed by MD, Disp: 30 each, Rfl: 1    multivitamin with minerals (PRESERVISION AREDS PO), Take 1 tablet by mouth Daily., Disp: , Rfl:     ondansetron (ZOFRAN) 4 MG tablet, Take 1 tablet by mouth Every 8 (Eight) Hours As Needed for Nausea or Vomiting., Disp: , Rfl:     pravastatin (PRAVACHOL) 40 MG tablet, TAKE 1 TABLET BY MOUTH AT BEDTIME, Disp: 90 tablet, Rfl: 3    Vitamin D, Cholecalciferol, (CHOLECALCIFEROL) 10 MCG (400 UNIT) tablet, Take 1 tablet by mouth Daily., Disp: , Rfl:     carbamide peroxide (Debrox) 6.5 % otic solution, Administer 5 drops into both ears As Needed for Ear Pain., Disp: 15 mL, Rfl: 0    Diclofenac Sodium (VOLTAREN) 1 % gel gel, Apply 4 g topically to the appropriate area as directed 4 (Four) Times a Day As Needed (pain)., Disp: 100 g, Rfl: 0    fluticasone (FLONASE) 50 MCG/ACT nasal spray, 2 sprays into the nostril(s) as directed by provider Daily., Disp: , Rfl:     multivitamin (MULTIVITAMIN PO), Take  by mouth Daily., Disp: , Rfl:     Allergies   Allergen Reactions    Miralax [Polyethylene Glycol] Other (See  "Comments)     'gaggy' feeling    Atorvastatin Headache    Crestor [Rosuvastatin Calcium] Headache    Lipitor [Atorvastatin Calcium] Myalgia    Penicillins Rash       Objective     Physical Exam:  Vital Signs:   Vitals:    07/23/24 1427   BP: 138/83   Pulse: 58   Resp: 18   Temp: 97.5 °F (36.4 °C)   TempSrc: Temporal   SpO2: 97%   Weight: 84.4 kg (186 lb)   Height: 157.5 cm (62\")     Body mass index is 34.02 kg/m².         Physical Exam  Constitutional:       Appearance: She is obese. She is not ill-appearing.   HENT:      Head: Normocephalic.      Right Ear: External ear normal.      Left Ear: External ear normal.   Eyes:      Conjunctiva/sclera: Conjunctivae normal.      Pupils: Pupils are equal, round, and reactive to light.   Cardiovascular:      Rate and Rhythm: Normal rate and regular rhythm.      Pulses:           Radial pulses are 2+ on the right side and 2+ on the left side.        Dorsalis pedis pulses are 2+ on the right side and 2+ on the left side.      Heart sounds: Normal heart sounds.   Pulmonary:      Effort: Pulmonary effort is normal.      Breath sounds: Normal breath sounds.   Musculoskeletal:      Cervical back: Normal range of motion and neck supple.      Right lower leg: No edema.      Left lower leg: No edema.   Skin:     General: Skin is warm.      Capillary Refill: Capillary refill takes less than 2 seconds.      Comments: Flesh colored, raised dry lesion above brow   Neurological:      Mental Status: She is alert and oriented to person, place, and time.      Coordination: Coordination normal.      Gait: Gait normal.   Psychiatric:         Mood and Affect: Mood normal.         Behavior: Behavior normal.         Thought Content: Thought content normal.             Assessment / Plan      Assessment/Plan:   Diagnoses and all orders for this visit:    1. Change in pigmented lesion of face (Primary)  -     Ambulatory Referral to Dermatology    2. Back muscle spasm  -     baclofen (LIORESAL) 20 MG " tablet; Take 1 tablet by mouth 3 (Three) Times a Day As Needed for Muscle Spasms.  Dispense: 90 tablet; Refill: 1    3. Essential hypertension  4. Coronary artery disease involving native coronary artery of native heart without angina pectoris        - Follow heart healthy diet.  Keep sodium intake < 1500 mg per day.  Avoid processed & fast foods.          - Exercise as tolerated        - Take medications as prescribed.    5. Chronic kidney disease, stage 4 (severe)        - Continue to avoid NSAIDS, stay well hydrated.  Lower protein diet.         - Keep blood pressure well managed.             Follow Up:   Return if symptoms worsen or fail to improve, for Medicare Wellness.    Patient was given instructions and counseling regarding her condition or for health maintenance advice. Please see specific information pulled into the AVS if appropriate.       [facility] [address] [facility]     Please note that portions of this note may have been completed with a voice recognition program. Efforts were made to edit dictation, but occasionally words are mistranscribed.

## 2024-10-21 DIAGNOSIS — M62.830 BACK MUSCLE SPASM: ICD-10-CM

## 2024-10-21 RX ORDER — BACLOFEN 20 MG/1
20 TABLET ORAL 3 TIMES DAILY PRN
Qty: 90 TABLET | Refills: 1 | Status: SHIPPED | OUTPATIENT
Start: 2024-10-21

## 2024-11-14 DIAGNOSIS — E78.00 PURE HYPERCHOLESTEROLEMIA: ICD-10-CM

## 2024-11-14 RX ORDER — PRAVASTATIN SODIUM 40 MG
TABLET ORAL
Qty: 90 TABLET | Refills: 3 | Status: SHIPPED | OUTPATIENT
Start: 2024-11-14

## 2024-12-12 ENCOUNTER — OFFICE VISIT (OUTPATIENT)
Dept: CARDIOLOGY | Facility: CLINIC | Age: 89
End: 2024-12-12
Payer: MEDICARE

## 2024-12-12 VITALS
SYSTOLIC BLOOD PRESSURE: 142 MMHG | DIASTOLIC BLOOD PRESSURE: 62 MMHG | BODY MASS INDEX: 35.51 KG/M2 | HEART RATE: 71 BPM | HEIGHT: 62 IN | WEIGHT: 193 LBS | OXYGEN SATURATION: 94 %

## 2024-12-12 DIAGNOSIS — I25.10 CORONARY ARTERY DISEASE INVOLVING NATIVE CORONARY ARTERY OF NATIVE HEART WITHOUT ANGINA PECTORIS: Primary | ICD-10-CM

## 2024-12-12 DIAGNOSIS — I48.0 PAROXYSMAL ATRIAL FIBRILLATION: ICD-10-CM

## 2024-12-12 DIAGNOSIS — E78.5 DYSLIPIDEMIA: ICD-10-CM

## 2024-12-12 DIAGNOSIS — I10 ESSENTIAL HYPERTENSION: ICD-10-CM

## 2024-12-12 PROCEDURE — 1160F RVW MEDS BY RX/DR IN RCRD: CPT | Performed by: INTERNAL MEDICINE

## 2024-12-12 PROCEDURE — 1159F MED LIST DOCD IN RCRD: CPT | Performed by: INTERNAL MEDICINE

## 2024-12-12 PROCEDURE — 99213 OFFICE O/P EST LOW 20 MIN: CPT | Performed by: INTERNAL MEDICINE

## 2024-12-12 RX ORDER — CLOPIDOGREL BISULFATE 75 MG/1
75 TABLET ORAL DAILY
Qty: 30 TABLET | Refills: 11 | Status: SHIPPED | OUTPATIENT
Start: 2024-12-12

## 2024-12-12 RX ORDER — CARVEDILOL 3.12 MG/1
TABLET ORAL
Qty: 60 TABLET | Refills: 11 | Status: SHIPPED | OUTPATIENT
Start: 2024-12-12

## 2024-12-12 NOTE — PROGRESS NOTES
Subjective:     Encounter Date:12/12/2024      Patient ID: Tanya Elise is a 89 y.o. female.    Chief Complaint: Atrial fibrillation  HPI  This is a 89-year-old female patient who presents to cardiology clinic for routine follow-up.  Since her last visit she has had no active cardiovascular issues, symptoms or hospitalizations.  She is a non-smoker.  She reports compliance with her medications with no perceived side effects.  She has had no bleeding complications related to anticoagulation therapy with NOAC and concomitant Plavix use due to coronary artery disease.  There have been no signs or symptoms to suggest cardioembolic event.  The following portions of the patient's history were reviewed and updated as appropriate: allergies, current medications, past family history, past medical history, past social history, past surgical history and problem  Review of Systems   Constitutional: Negative for chills, diaphoresis, fever, malaise/fatigue, weight gain and weight loss.   HENT:  Negative for ear discharge, hearing loss, hoarse voice and nosebleeds.    Eyes:  Negative for discharge, double vision, pain and photophobia.   Cardiovascular:  Negative for chest pain, claudication, cyanosis, dyspnea on exertion, irregular heartbeat, leg swelling, near-syncope, orthopnea, palpitations, paroxysmal nocturnal dyspnea and syncope.   Respiratory:  Negative for cough, hemoptysis, sputum production and wheezing.    Endocrine: Negative for cold intolerance, heat intolerance, polydipsia, polyphagia and polyuria.   Hematologic/Lymphatic: Negative for adenopathy and bleeding problem. Does not bruise/bleed easily.   Skin:  Negative for color change, flushing, itching and rash.   Musculoskeletal:  Negative for muscle cramps, muscle weakness, myalgias and stiffness.   Gastrointestinal:  Negative for abdominal pain, diarrhea, hematemesis, hematochezia, nausea and vomiting.   Genitourinary:  Negative for dysuria, frequency and  nocturia.   Neurological:  Negative for focal weakness, loss of balance, numbness, paresthesias and seizures.   Psychiatric/Behavioral:  Negative for altered mental status, hallucinations and suicidal ideas.    Allergic/Immunologic: Negative for HIV exposure, hives and persistent infections.           Current Outpatient Medications:     acetaminophen (TYLENOL) 500 MG tablet, Take 1 tablet by mouth., Disp: , Rfl:     amLODIPine (NORVASC) 5 MG tablet, TAKE 1 TABLET BY MOUTH EVERY NIGHT, Disp: 90 tablet, Rfl: 3    apixaban (Eliquis) 2.5 MG tablet tablet, TAKE 1 TABLET BY MOUTH EVERY 12 HOURS FOR ATRIAL FIBRILLATION, Disp: 180 tablet, Rfl: 3    baclofen (LIORESAL) 20 MG tablet, TAKE 1 TABLET BY MOUTH THREE TIMES DAILY AS NEEDED FOR MUSCLE SPASMS, Disp: 90 tablet, Rfl: 1    carvedilol (COREG) 3.125 MG tablet, TAKE 1 TABLET BY MOUTH TWICE DAILY WITH MEALS, Disp: 60 tablet, Rfl: 11    clopidogrel (PLAVIX) 75 MG tablet, TAKE 1 TABLET BY MOUTH DAILY, Disp: 30 tablet, Rfl: 11    ferrous sulfate 325 (65 FE) MG tablet, Take 1 tablet by mouth 3 (Three) Times a Week., Disp: , Rfl:     fexofenadine (ALLEGRA) 60 MG tablet, Take 1 tablet by mouth Daily As Needed., Disp: , Rfl:     furosemide (LASIX) 40 MG tablet, TAKE 1 TABLET BY MOUTH DAILY AS NEEDED FOR SWELLING, Disp: 30 tablet, Rfl: 1    lidocaine (LIDODERM) 5 %, Place 1 patch on the skin as directed by provider Daily. Remove & Discard patch within 12 hours or as directed by MD, Disp: 30 each, Rfl: 1    ondansetron (ZOFRAN) 4 MG tablet, Take 1 tablet by mouth Every 8 (Eight) Hours As Needed for Nausea or Vomiting., Disp: , Rfl:     pravastatin (PRAVACHOL) 40 MG tablet, TAKE 1 TABLET BY MOUTH AT BEDTIME, Disp: 90 tablet, Rfl: 3    Vitamin D, Cholecalciferol, (CHOLECALCIFEROL) 10 MCG (400 UNIT) tablet, Take 1 tablet by mouth Daily., Disp: , Rfl:     Objective:   Vitals and nursing note reviewed.   Constitutional:       Appearance: Healthy appearance. Not in distress.   Neck:       "Vascular: No JVR. JVD normal.   Pulmonary:      Effort: Pulmonary effort is normal.      Breath sounds: Normal breath sounds. No wheezing. No rhonchi. No rales.   Chest:      Chest wall: Not tender to palpatation.   Cardiovascular:      PMI at left midclavicular line. Normal rate. Regular rhythm. Normal S1. Normal S2.       Murmurs: There is no murmur.      No gallop.  No click. No rub.   Pulses:     Intact distal pulses.   Edema:     Peripheral edema absent.   Abdominal:      General: Bowel sounds are normal.      Palpations: Abdomen is soft.      Tenderness: There is no abdominal tenderness.   Musculoskeletal: Normal range of motion.         General: No tenderness. Skin:     General: Skin is warm and dry.   Neurological:      General: No focal deficit present.      Mental Status: Alert and oriented to person, place and time.       Blood pressure 142/62, pulse 71, height 157.5 cm (62\"), weight 87.5 kg (193 lb), SpO2 94%, not currently breastfeeding.   Lab Review:     Assessment:       1. Coronary artery disease involving native coronary artery of native heart without angina pectoris      2. Dyslipidemia  Tolerating statin based cholesterol-lowering therapy without side effects.    3. Essential hypertension  Acceptable blood pressure control.    4. Paroxysmal atrial fibrillation  Rate control and anticoagulation management strategy.  Asymptomatic.    Procedures    Plan:     Advance Care Planning   ACP discussion was held with the patient during this visit. Patient has an advance directive (not in EMR), copy requested.           "

## 2024-12-30 DIAGNOSIS — E78.00 PURE HYPERCHOLESTEROLEMIA: ICD-10-CM

## 2024-12-30 RX ORDER — AMLODIPINE BESYLATE 5 MG/1
5 TABLET ORAL NIGHTLY
Qty: 90 TABLET | Refills: 3 | Status: SHIPPED | OUTPATIENT
Start: 2024-12-30

## 2025-01-08 RX ORDER — CLOPIDOGREL BISULFATE 75 MG/1
75 TABLET ORAL DAILY
Qty: 30 TABLET | Refills: 11 | Status: SHIPPED | OUTPATIENT
Start: 2025-01-08

## 2025-01-08 RX ORDER — CARVEDILOL 3.12 MG/1
TABLET ORAL
Qty: 60 TABLET | Refills: 11 | Status: SHIPPED | OUTPATIENT
Start: 2025-01-08

## 2025-01-10 DIAGNOSIS — M62.830 BACK MUSCLE SPASM: ICD-10-CM

## 2025-01-10 RX ORDER — BACLOFEN 10 MG/1
10 TABLET ORAL 2 TIMES DAILY
Qty: 180 TABLET | Refills: 1 | OUTPATIENT
Start: 2025-01-10

## 2025-01-10 RX ORDER — BACLOFEN 20 MG/1
20 TABLET ORAL 3 TIMES DAILY PRN
Qty: 90 TABLET | Refills: 1 | Status: SHIPPED | OUTPATIENT
Start: 2025-01-10

## 2025-04-23 DIAGNOSIS — M62.830 BACK MUSCLE SPASM: ICD-10-CM

## 2025-04-24 RX ORDER — BACLOFEN 20 MG/1
20 TABLET ORAL 3 TIMES DAILY PRN
Qty: 90 TABLET | Refills: 1 | Status: SHIPPED | OUTPATIENT
Start: 2025-04-24

## 2025-06-04 DIAGNOSIS — I48.91 ATRIAL FIBRILLATION, UNSPECIFIED TYPE: ICD-10-CM

## 2025-06-06 NOTE — TELEPHONE ENCOUNTER
Agustin sent a fax asking for refills on Eliquis 2.5 mg  
Called pt, no answer, lvm requesting return call   
Hub staff attempted to follow warm transfer process and was unsuccessful     Caller: [facility]    Relationship to patient: SON    Best call back number: [phone]    Patient is needing: PATIENT RETURNED CALL FROM JAYDA. CHART STATES LABS NEEDED PRIOR TO MEDICATION REFILL. NO HUB TO RELAY. WARM TRANSFER UNSUCCESSFUL. PLEASE RETURN PATIENT CALL WITH INFORMATION.     
Needs to have a CBC drawn per hospital protocol.  Order has been sent to the lab.  Courtesy 30 days refilled.    Electronically signed by ESTEPHANIE Marino, 06/04/25, 11:04 AM EDT.    
Pt advised of previous message from provider and verbalized understanding   
No

## 2025-06-09 ENCOUNTER — LAB (OUTPATIENT)
Dept: LAB | Facility: HOSPITAL | Age: OVER 89
End: 2025-06-09
Payer: MEDICARE

## 2025-06-09 DIAGNOSIS — I48.91 ATRIAL FIBRILLATION, UNSPECIFIED TYPE: ICD-10-CM

## 2025-06-09 LAB
DEPRECATED RDW RBC AUTO: 49.7 FL (ref 37–54)
ERYTHROCYTE [DISTWIDTH] IN BLOOD BY AUTOMATED COUNT: 14.3 % (ref 12.3–15.4)
HCT VFR BLD AUTO: 34.5 % (ref 34–46.6)
HGB BLD-MCNC: 11.4 G/DL (ref 12–15.9)
MCH RBC QN AUTO: 31.7 PG (ref 26.6–33)
MCHC RBC AUTO-ENTMCNC: 33 G/DL (ref 31.5–35.7)
MCV RBC AUTO: 95.8 FL (ref 79–97)
PLATELET # BLD AUTO: 221 10*3/MM3 (ref 140–450)
PMV BLD AUTO: 10.9 FL (ref 6–12)
RBC # BLD AUTO: 3.6 10*6/MM3 (ref 3.77–5.28)
WBC NRBC COR # BLD AUTO: 5.27 10*3/MM3 (ref 3.4–10.8)

## 2025-06-09 PROCEDURE — 36415 COLL VENOUS BLD VENIPUNCTURE: CPT

## 2025-06-09 PROCEDURE — 85027 COMPLETE CBC AUTOMATED: CPT

## 2025-07-07 DIAGNOSIS — I48.91 ATRIAL FIBRILLATION, UNSPECIFIED TYPE: ICD-10-CM

## 2025-07-07 RX ORDER — APIXABAN 2.5 MG/1
2.5 TABLET, FILM COATED ORAL EVERY 12 HOURS SCHEDULED
Qty: 60 TABLET | Refills: 0 | Status: SHIPPED | OUTPATIENT
Start: 2025-07-07

## 2025-07-17 DIAGNOSIS — M62.830 BACK MUSCLE SPASM: ICD-10-CM

## 2025-07-18 RX ORDER — BACLOFEN 20 MG/1
20 TABLET ORAL 3 TIMES DAILY PRN
Qty: 90 TABLET | Refills: 1 | Status: SHIPPED | OUTPATIENT
Start: 2025-07-18

## 2025-08-05 DIAGNOSIS — I48.91 ATRIAL FIBRILLATION, UNSPECIFIED TYPE: ICD-10-CM

## 2025-08-05 RX ORDER — APIXABAN 2.5 MG/1
2.5 TABLET, FILM COATED ORAL EVERY 12 HOURS SCHEDULED
Qty: 60 TABLET | Refills: 0 | Status: SHIPPED | OUTPATIENT
Start: 2025-08-05

## (undated) DEVICE — GUIDE CATHETER: Brand: MACH1™

## (undated) DEVICE — CATH F6 ST JR 4 100CM: Brand: SUPERTORQUE

## (undated) DEVICE — GW INQWIRE FC PTFE STD J/1.5 .035 260

## (undated) DEVICE — SKIN PREP TRAY W/CHG: Brand: MEDLINE INDUSTRIES, INC.

## (undated) DEVICE — CVR PROB ULTRASND GLS STRL

## (undated) DEVICE — GLIDESHEATH SLENDER STAINLESS STEEL KIT: Brand: GLIDESHEATH SLENDER

## (undated) DEVICE — TR BAND RADIAL ARTERY COMPRESSION DEVICE: Brand: TR BAND

## (undated) DEVICE — GW PT 2 LS .014 185CM STR TIP

## (undated) DEVICE — RUNTHROUGH NS EXTRA FLOPPY PTCA GUIDEWIRE: Brand: RUNTHROUGH

## (undated) DEVICE — 12CC CONTROL SYRINGE – FR/TR/RA W/RESERVOIR: Brand: CONTROL SYRINGE

## (undated) DEVICE — GUIDELINER CATHETERS ARE INTENDED TO BE USED IN CONJUNCTION WITH GUIDE CATHETERS TO ACCESS DISCRETE REGIONS OF THE CORONARY AND/OR PERIPHERAL VASCULATURE, AND TO FACILITATE PLACEMENT OF INTERVENTIONAL DEVICES.: Brand: GUIDELINER® V3 CATHETER

## (undated) DEVICE — MINI TREK CORONARY DILATATION CATHETER 2.0 MM X 20 MM / RAPID-EXCHANGE: Brand: MINI TREK

## (undated) DEVICE — RADIFOCUS OPTITORQUE ANGIOGRAPHIC CATHETER: Brand: OPTITORQUE